# Patient Record
Sex: MALE | Race: WHITE | Employment: OTHER | URBAN - METROPOLITAN AREA
[De-identification: names, ages, dates, MRNs, and addresses within clinical notes are randomized per-mention and may not be internally consistent; named-entity substitution may affect disease eponyms.]

---

## 2017-07-12 ENCOUNTER — HOSPITAL ENCOUNTER (OUTPATIENT)
Dept: LAB | Age: 82
Discharge: HOME | End: 2017-07-12

## 2017-07-12 PROCEDURE — 88305 TISSUE EXAM BY PATHOLOGIST: CPT | Performed by: INTERNAL MEDICINE

## 2017-07-12 PROCEDURE — 88312 SPECIAL STAINS GROUP 1: CPT | Performed by: INTERNAL MEDICINE

## 2018-07-26 PROBLEM — M54.2 CERVICALGIA: Status: ACTIVE | Noted: 2018-05-16

## 2018-08-08 ENCOUNTER — HOSPITAL ENCOUNTER (OUTPATIENT)
Dept: CT IMAGING | Age: 83
Discharge: HOME | End: 2018-08-08
Attending: INTERNAL MEDICINE
Payer: MEDICARE

## 2018-08-08 DIAGNOSIS — I70.209 ATHEROSCLEROSIS OF ARTERIES OF EXTREMITIES (HCC): ICD-10-CM

## 2018-08-08 LAB — CREAT BLD-MCNC: 1.3 MG/DL (ref 0.8–1.5)

## 2018-08-08 PROCEDURE — 74011636320 HC RX REV CODE- 636/320: Performed by: INTERNAL MEDICINE

## 2018-08-08 PROCEDURE — 82565 ASSAY OF CREATININE: CPT

## 2018-08-08 PROCEDURE — 75635 CT ANGIO ABDOMINAL ARTERIES: CPT

## 2018-08-08 PROCEDURE — 74011000258 HC RX REV CODE- 258: Performed by: INTERNAL MEDICINE

## 2018-08-08 RX ORDER — SODIUM CHLORIDE 0.9 % (FLUSH) 0.9 %
10 SYRINGE (ML) INJECTION
Status: COMPLETED | OUTPATIENT
Start: 2018-08-08 | End: 2018-08-08

## 2018-08-08 RX ADMIN — IOPAMIDOL 125 ML: 755 INJECTION, SOLUTION INTRAVENOUS at 11:23

## 2018-08-08 RX ADMIN — SODIUM CHLORIDE 100 ML: 900 INJECTION, SOLUTION INTRAVENOUS at 11:24

## 2018-08-08 RX ADMIN — Medication 10 ML: at 11:24

## 2018-08-13 PROBLEM — I73.9 PAD (PERIPHERAL ARTERY DISEASE) (HCC): Status: ACTIVE | Noted: 2018-08-13

## 2019-01-17 ENCOUNTER — HOSPITAL ENCOUNTER (OUTPATIENT)
Dept: LAB | Age: 84
Discharge: HOME | End: 2019-01-17
Payer: MEDICARE

## 2019-01-17 DIAGNOSIS — C61 MALIGNANT NEOPLASM OF PROSTATE (HCC): ICD-10-CM

## 2019-01-17 LAB
ALBUMIN SERPL-MCNC: 3.6 G/DL (ref 3.2–4.6)
ALBUMIN/GLOB SERPL: 1 {RATIO} (ref 1.2–3.5)
ALP SERPL-CCNC: 69 U/L (ref 50–136)
ALT SERPL-CCNC: 16 U/L (ref 12–65)
ANION GAP SERPL CALC-SCNC: 0 MMOL/L (ref 7–16)
AST SERPL-CCNC: 12 U/L (ref 15–37)
BASOPHILS # BLD: 0 K/UL (ref 0–0.2)
BASOPHILS NFR BLD: 1 % (ref 0–2)
BILIRUB SERPL-MCNC: 0.6 MG/DL (ref 0.2–1.1)
BUN SERPL-MCNC: 22 MG/DL (ref 8–23)
CALCIUM SERPL-MCNC: 8.8 MG/DL (ref 8.3–10.4)
CHLORIDE SERPL-SCNC: 108 MMOL/L (ref 98–107)
CO2 SERPL-SCNC: 34 MMOL/L (ref 21–32)
CREAT SERPL-MCNC: 1.33 MG/DL (ref 0.8–1.5)
DIFFERENTIAL METHOD BLD: ABNORMAL
EOSINOPHIL # BLD: 0.1 K/UL (ref 0–0.8)
EOSINOPHIL NFR BLD: 3 % (ref 0.5–7.8)
ERYTHROCYTE [DISTWIDTH] IN BLOOD BY AUTOMATED COUNT: 13.6 % (ref 11.9–14.6)
GLOBULIN SER CALC-MCNC: 3.7 G/DL (ref 2.3–3.5)
GLUCOSE SERPL-MCNC: 150 MG/DL (ref 65–100)
HCT VFR BLD AUTO: 38.7 % (ref 41.1–50.3)
HGB BLD-MCNC: 12.7 G/DL (ref 13.6–17.2)
IMM GRANULOCYTES # BLD AUTO: 0 K/UL (ref 0–0.5)
IMM GRANULOCYTES NFR BLD AUTO: 0 % (ref 0–5)
LYMPHOCYTES # BLD: 1.2 K/UL (ref 0.5–4.6)
LYMPHOCYTES NFR BLD: 24 % (ref 13–44)
MCH RBC QN AUTO: 30.7 PG (ref 26.1–32.9)
MCHC RBC AUTO-ENTMCNC: 32.8 G/DL (ref 31.4–35)
MCV RBC AUTO: 93.5 FL (ref 79.6–97.8)
MONOCYTES # BLD: 0.5 K/UL (ref 0.1–1.3)
MONOCYTES NFR BLD: 9 % (ref 4–12)
NEUTS SEG # BLD: 3.2 K/UL (ref 1.7–8.2)
NEUTS SEG NFR BLD: 63 % (ref 43–78)
NRBC # BLD: 0 K/UL (ref 0–0.2)
PLATELET # BLD AUTO: 152 K/UL (ref 150–450)
PMV BLD AUTO: 9.7 FL (ref 9.4–12.3)
POTASSIUM SERPL-SCNC: 3.8 MMOL/L (ref 3.5–5.1)
PROT SERPL-MCNC: 7.3 G/DL (ref 6.3–8.2)
RBC # BLD AUTO: 4.14 M/UL (ref 4.23–5.67)
SODIUM SERPL-SCNC: 142 MMOL/L (ref 136–145)
WBC # BLD AUTO: 5.1 K/UL (ref 4.3–11.1)

## 2019-01-17 PROCEDURE — 80053 COMPREHEN METABOLIC PANEL: CPT

## 2019-01-17 PROCEDURE — 85025 COMPLETE CBC W/AUTO DIFF WBC: CPT

## 2019-01-17 PROCEDURE — 36415 COLL VENOUS BLD VENIPUNCTURE: CPT

## 2019-09-25 PROBLEM — Z85.828 STATUS POST MOHS SURGERY FOR SQUAMOUS CELL CARCINOMA OF SKIN: Status: ACTIVE | Noted: 2019-09-25

## 2019-09-25 PROBLEM — Z98.890 STATUS POST MOHS SURGERY FOR SQUAMOUS CELL CARCINOMA OF SKIN: Status: ACTIVE | Noted: 2019-09-25

## 2019-12-03 ENCOUNTER — APPOINTMENT (OUTPATIENT)
Dept: CT IMAGING | Age: 84
DRG: 065 | End: 2019-12-03
Attending: EMERGENCY MEDICINE
Payer: MEDICARE

## 2019-12-03 ENCOUNTER — HOSPITAL ENCOUNTER (INPATIENT)
Age: 84
LOS: 2 days | Discharge: HOME | DRG: 065 | End: 2019-12-05
Attending: EMERGENCY MEDICINE | Admitting: FAMILY MEDICINE
Payer: MEDICARE

## 2019-12-03 DIAGNOSIS — I10 ESSENTIAL HYPERTENSION: ICD-10-CM

## 2019-12-03 DIAGNOSIS — I10 MALIGNANT HYPERTENSION: ICD-10-CM

## 2019-12-03 DIAGNOSIS — G45.9 TIA (TRANSIENT ISCHEMIC ATTACK): Primary | ICD-10-CM

## 2019-12-03 PROBLEM — I16.1 HYPERTENSIVE EMERGENCY: Status: ACTIVE | Noted: 2019-12-03

## 2019-12-03 LAB
ALBUMIN SERPL-MCNC: 3.9 G/DL (ref 3.2–4.6)
ALBUMIN/GLOB SERPL: 1.1 {RATIO} (ref 1.2–3.5)
ALP SERPL-CCNC: 58 U/L (ref 50–136)
ALT SERPL-CCNC: 18 U/L (ref 12–65)
ANION GAP SERPL CALC-SCNC: 5 MMOL/L (ref 7–16)
APPEARANCE UR: CLEAR
APTT PPP: 28 SEC (ref 24.7–39.8)
AST SERPL-CCNC: 18 U/L (ref 15–37)
BACTERIA URNS QL MICRO: 0 /HPF
BASOPHILS # BLD: 0 K/UL (ref 0–0.2)
BASOPHILS NFR BLD: 1 % (ref 0–2)
BILIRUB SERPL-MCNC: 0.8 MG/DL (ref 0.2–1.1)
BILIRUB UR QL: NEGATIVE
BUN SERPL-MCNC: 27 MG/DL (ref 8–23)
CALCIUM SERPL-MCNC: 9 MG/DL (ref 8.3–10.4)
CASTS URNS QL MICRO: 0 /LPF
CHLORIDE SERPL-SCNC: 108 MMOL/L (ref 98–107)
CO2 SERPL-SCNC: 32 MMOL/L (ref 21–32)
COLOR UR: YELLOW
CREAT SERPL-MCNC: 1.34 MG/DL (ref 0.8–1.5)
DIFFERENTIAL METHOD BLD: ABNORMAL
EOSINOPHIL # BLD: 0.1 K/UL (ref 0–0.8)
EOSINOPHIL NFR BLD: 2 % (ref 0.5–7.8)
EPI CELLS #/AREA URNS HPF: 0 /HPF
ERYTHROCYTE [DISTWIDTH] IN BLOOD BY AUTOMATED COUNT: 13.2 % (ref 11.9–14.6)
GLOBULIN SER CALC-MCNC: 3.5 G/DL (ref 2.3–3.5)
GLUCOSE BLD STRIP.AUTO-MCNC: 103 MG/DL (ref 65–100)
GLUCOSE SERPL-MCNC: 101 MG/DL (ref 65–100)
GLUCOSE UR STRIP.AUTO-MCNC: NEGATIVE MG/DL
HCT VFR BLD AUTO: 42.9 % (ref 41.1–50.3)
HGB BLD-MCNC: 14.2 G/DL (ref 13.6–17.2)
HGB UR QL STRIP: ABNORMAL
IMM GRANULOCYTES # BLD AUTO: 0 K/UL (ref 0–0.5)
IMM GRANULOCYTES NFR BLD AUTO: 0 % (ref 0–5)
INR BLD: 1.5 (ref 0.9–1.2)
INR PPP: 1
KETONES UR QL STRIP.AUTO: NEGATIVE MG/DL
LEUKOCYTE ESTERASE UR QL STRIP.AUTO: NEGATIVE
LYMPHOCYTES # BLD: 1.6 K/UL (ref 0.5–4.6)
LYMPHOCYTES NFR BLD: 27 % (ref 13–44)
MCH RBC QN AUTO: 31.6 PG (ref 26.1–32.9)
MCHC RBC AUTO-ENTMCNC: 33.1 G/DL (ref 31.4–35)
MCV RBC AUTO: 95.3 FL (ref 79.6–97.8)
MONOCYTES # BLD: 0.6 K/UL (ref 0.1–1.3)
MONOCYTES NFR BLD: 10 % (ref 4–12)
NEUTS SEG # BLD: 3.6 K/UL (ref 1.7–8.2)
NEUTS SEG NFR BLD: 60 % (ref 43–78)
NITRITE UR QL STRIP.AUTO: NEGATIVE
NRBC # BLD: 0 K/UL (ref 0–0.2)
PH UR STRIP: 7.5 [PH] (ref 5–9)
PLATELET # BLD AUTO: 148 K/UL (ref 150–450)
PMV BLD AUTO: 10.2 FL (ref 9.4–12.3)
POTASSIUM SERPL-SCNC: 4 MMOL/L (ref 3.5–5.1)
PROT SERPL-MCNC: 7.4 G/DL (ref 6.3–8.2)
PROT UR STRIP-MCNC: NEGATIVE MG/DL
PROTHROMBIN TIME: 13.1 SEC (ref 11.7–14.5)
PT BLD: 17.6 SECS (ref 9.6–11.6)
RBC # BLD AUTO: 4.5 M/UL (ref 4.23–5.6)
RBC #/AREA URNS HPF: ABNORMAL /HPF
SODIUM SERPL-SCNC: 145 MMOL/L (ref 136–145)
SP GR UR REFRACTOMETRY: 1.01 (ref 1–1.02)
UROBILINOGEN UR QL STRIP.AUTO: 0.2 EU/DL (ref 0.2–1)
WBC # BLD AUTO: 5.9 K/UL (ref 4.3–11.1)
WBC URNS QL MICRO: 0 /HPF

## 2019-12-03 PROCEDURE — 96375 TX/PRO/DX INJ NEW DRUG ADDON: CPT | Performed by: EMERGENCY MEDICINE

## 2019-12-03 PROCEDURE — 74011250636 HC RX REV CODE- 250/636: Performed by: EMERGENCY MEDICINE

## 2019-12-03 PROCEDURE — 96374 THER/PROPH/DIAG INJ IV PUSH: CPT | Performed by: EMERGENCY MEDICINE

## 2019-12-03 PROCEDURE — 81001 URINALYSIS AUTO W/SCOPE: CPT

## 2019-12-03 PROCEDURE — 80053 COMPREHEN METABOLIC PANEL: CPT

## 2019-12-03 PROCEDURE — 74011250637 HC RX REV CODE- 250/637: Performed by: EMERGENCY MEDICINE

## 2019-12-03 PROCEDURE — 93005 ELECTROCARDIOGRAM TRACING: CPT | Performed by: EMERGENCY MEDICINE

## 2019-12-03 PROCEDURE — 85730 THROMBOPLASTIN TIME PARTIAL: CPT

## 2019-12-03 PROCEDURE — 85610 PROTHROMBIN TIME: CPT

## 2019-12-03 PROCEDURE — 74011000250 HC RX REV CODE- 250: Performed by: EMERGENCY MEDICINE

## 2019-12-03 PROCEDURE — 65660000000 HC RM CCU STEPDOWN

## 2019-12-03 PROCEDURE — 85025 COMPLETE CBC W/AUTO DIFF WBC: CPT

## 2019-12-03 PROCEDURE — 70450 CT HEAD/BRAIN W/O DYE: CPT

## 2019-12-03 PROCEDURE — 99285 EMERGENCY DEPT VISIT HI MDM: CPT | Performed by: EMERGENCY MEDICINE

## 2019-12-03 PROCEDURE — 82962 GLUCOSE BLOOD TEST: CPT

## 2019-12-03 RX ORDER — NICARDIPINE HYDROCHLORIDE 0.1 MG/ML
5-15 INJECTION INTRAVENOUS CONTINUOUS
Status: DISCONTINUED | OUTPATIENT
Start: 2019-12-03 | End: 2019-12-03 | Stop reason: SDUPTHER

## 2019-12-03 RX ORDER — ASPIRIN 325 MG
325 TABLET ORAL
Status: COMPLETED | OUTPATIENT
Start: 2019-12-03 | End: 2019-12-03

## 2019-12-03 RX ORDER — CLONIDINE HYDROCHLORIDE 0.1 MG/1
0.1 TABLET ORAL
Status: COMPLETED | OUTPATIENT
Start: 2019-12-03 | End: 2019-12-03

## 2019-12-03 RX ORDER — LABETALOL HYDROCHLORIDE 5 MG/ML
10 INJECTION, SOLUTION INTRAVENOUS
Status: COMPLETED | OUTPATIENT
Start: 2019-12-03 | End: 2019-12-03

## 2019-12-03 RX ORDER — HYDRALAZINE HYDROCHLORIDE 20 MG/ML
10 INJECTION INTRAMUSCULAR; INTRAVENOUS
Status: COMPLETED | OUTPATIENT
Start: 2019-12-03 | End: 2019-12-03

## 2019-12-03 RX ADMIN — NICARDIPINE HYDROCHLORIDE 5 MG/HR: 25 INJECTION INTRAVENOUS at 23:11

## 2019-12-03 RX ADMIN — CLONIDINE HYDROCHLORIDE 0.1 MG: 0.1 TABLET ORAL at 20:33

## 2019-12-03 RX ADMIN — HYDRALAZINE HYDROCHLORIDE 10 MG: 20 INJECTION, SOLUTION INTRAMUSCULAR; INTRAVENOUS at 21:04

## 2019-12-03 RX ADMIN — LABETALOL HYDROCHLORIDE 10 MG: 5 INJECTION INTRAVENOUS at 20:21

## 2019-12-03 RX ADMIN — ASPIRIN 325 MG ORAL TABLET 325 MG: 325 PILL ORAL at 20:22

## 2019-12-04 ENCOUNTER — APPOINTMENT (OUTPATIENT)
Dept: ULTRASOUND IMAGING | Age: 84
DRG: 065 | End: 2019-12-04
Attending: FAMILY MEDICINE
Payer: MEDICARE

## 2019-12-04 ENCOUNTER — APPOINTMENT (OUTPATIENT)
Dept: MRI IMAGING | Age: 84
DRG: 065 | End: 2019-12-04
Attending: FAMILY MEDICINE
Payer: MEDICARE

## 2019-12-04 ENCOUNTER — APPOINTMENT (OUTPATIENT)
Dept: CT IMAGING | Age: 84
DRG: 065 | End: 2019-12-04
Attending: INTERNAL MEDICINE
Payer: MEDICARE

## 2019-12-04 PROBLEM — I63.9 CVA (CEREBRAL VASCULAR ACCIDENT) (HCC): Status: ACTIVE | Noted: 2019-12-04

## 2019-12-04 PROBLEM — G45.9 TIA (TRANSIENT ISCHEMIC ATTACK): Status: RESOLVED | Noted: 2019-12-03 | Resolved: 2019-12-04

## 2019-12-04 LAB
ATRIAL RATE: 69 BPM
CALCULATED P AXIS, ECG09: 61 DEGREES
CALCULATED R AXIS, ECG10: 10 DEGREES
CALCULATED T AXIS, ECG11: -10 DEGREES
CHOLEST SERPL-MCNC: 154 MG/DL
DIAGNOSIS, 93000: NORMAL
ERYTHROCYTE [DISTWIDTH] IN BLOOD BY AUTOMATED COUNT: 13.2 % (ref 11.9–14.6)
EST. AVERAGE GLUCOSE BLD GHB EST-MCNC: 114 MG/DL
GLUCOSE BLD STRIP.AUTO-MCNC: 137 MG/DL (ref 65–100)
HBA1C MFR BLD: 5.6 % (ref 4.8–6)
HCT VFR BLD AUTO: 36.3 % (ref 41.1–50.3)
HDLC SERPL-MCNC: 64 MG/DL (ref 40–60)
HDLC SERPL: 2.4 {RATIO}
HGB BLD-MCNC: 12.2 G/DL (ref 13.6–17.2)
LDLC SERPL CALC-MCNC: 81.6 MG/DL
LIPID PROFILE,FLP: ABNORMAL
MCH RBC QN AUTO: 31.8 PG (ref 26.1–32.9)
MCHC RBC AUTO-ENTMCNC: 33.6 G/DL (ref 31.4–35)
MCV RBC AUTO: 94.5 FL (ref 79.6–97.8)
NRBC # BLD: 0 K/UL (ref 0–0.2)
P-R INTERVAL, ECG05: 158 MS
PLATELET # BLD AUTO: 136 K/UL (ref 150–450)
PMV BLD AUTO: 10.1 FL (ref 9.4–12.3)
Q-T INTERVAL, ECG07: 380 MS
QRS DURATION, ECG06: 102 MS
QTC CALCULATION (BEZET), ECG08: 407 MS
RBC # BLD AUTO: 3.84 M/UL (ref 4.23–5.6)
TRIGL SERPL-MCNC: 42 MG/DL (ref 35–150)
VENTRICULAR RATE, ECG03: 69 BPM
VLDLC SERPL CALC-MCNC: 8.4 MG/DL (ref 6–23)
WBC # BLD AUTO: 6.8 K/UL (ref 4.3–11.1)

## 2019-12-04 PROCEDURE — 97161 PT EVAL LOW COMPLEX 20 MIN: CPT

## 2019-12-04 PROCEDURE — 70551 MRI BRAIN STEM W/O DYE: CPT

## 2019-12-04 PROCEDURE — 74011250637 HC RX REV CODE- 250/637: Performed by: FAMILY MEDICINE

## 2019-12-04 PROCEDURE — 93306 TTE W/DOPPLER COMPLETE: CPT

## 2019-12-04 PROCEDURE — 80061 LIPID PANEL: CPT

## 2019-12-04 PROCEDURE — 92610 EVALUATE SWALLOWING FUNCTION: CPT

## 2019-12-04 PROCEDURE — 97530 THERAPEUTIC ACTIVITIES: CPT

## 2019-12-04 PROCEDURE — 74011250636 HC RX REV CODE- 250/636: Performed by: INTERNAL MEDICINE

## 2019-12-04 PROCEDURE — 86580 TB INTRADERMAL TEST: CPT | Performed by: INTERNAL MEDICINE

## 2019-12-04 PROCEDURE — 82962 GLUCOSE BLOOD TEST: CPT

## 2019-12-04 PROCEDURE — 74011000302 HC RX REV CODE- 302: Performed by: INTERNAL MEDICINE

## 2019-12-04 PROCEDURE — 74011636320 HC RX REV CODE- 636/320: Performed by: INTERNAL MEDICINE

## 2019-12-04 PROCEDURE — 93880 EXTRACRANIAL BILAT STUDY: CPT

## 2019-12-04 PROCEDURE — 74011000258 HC RX REV CODE- 258: Performed by: INTERNAL MEDICINE

## 2019-12-04 PROCEDURE — 74011250637 HC RX REV CODE- 250/637: Performed by: INTERNAL MEDICINE

## 2019-12-04 PROCEDURE — 65660000000 HC RM CCU STEPDOWN

## 2019-12-04 PROCEDURE — 85027 COMPLETE CBC AUTOMATED: CPT

## 2019-12-04 PROCEDURE — 70496 CT ANGIOGRAPHY HEAD: CPT

## 2019-12-04 PROCEDURE — 83036 HEMOGLOBIN GLYCOSYLATED A1C: CPT

## 2019-12-04 RX ORDER — SODIUM CHLORIDE 0.9 % (FLUSH) 0.9 %
5-40 SYRINGE (ML) INJECTION EVERY 8 HOURS
Status: DISCONTINUED | OUTPATIENT
Start: 2019-12-04 | End: 2019-12-05 | Stop reason: HOSPADM

## 2019-12-04 RX ORDER — FAMOTIDINE 20 MG/1
20 TABLET, FILM COATED ORAL DAILY
Status: DISCONTINUED | OUTPATIENT
Start: 2019-12-04 | End: 2019-12-05 | Stop reason: HOSPADM

## 2019-12-04 RX ORDER — MECLIZINE HYDROCHLORIDE 25 MG/1
25 TABLET ORAL
Status: DISCONTINUED | OUTPATIENT
Start: 2019-12-04 | End: 2019-12-05 | Stop reason: HOSPADM

## 2019-12-04 RX ORDER — ATORVASTATIN CALCIUM 40 MG/1
40 TABLET, FILM COATED ORAL
Status: DISCONTINUED | OUTPATIENT
Start: 2019-12-04 | End: 2019-12-04

## 2019-12-04 RX ORDER — LABETALOL HYDROCHLORIDE 5 MG/ML
5 INJECTION, SOLUTION INTRAVENOUS
Status: DISCONTINUED | OUTPATIENT
Start: 2019-12-04 | End: 2019-12-05 | Stop reason: HOSPADM

## 2019-12-04 RX ORDER — GUAIFENESIN 100 MG/5ML
81 LIQUID (ML) ORAL DAILY
Status: DISCONTINUED | OUTPATIENT
Start: 2019-12-04 | End: 2019-12-05 | Stop reason: HOSPADM

## 2019-12-04 RX ORDER — FAMOTIDINE 20 MG/1
20 TABLET, FILM COATED ORAL 2 TIMES DAILY
Status: DISCONTINUED | OUTPATIENT
Start: 2019-12-04 | End: 2019-12-04 | Stop reason: SDUPTHER

## 2019-12-04 RX ORDER — ATORVASTATIN CALCIUM 40 MG/1
80 TABLET, FILM COATED ORAL
Status: DISCONTINUED | OUTPATIENT
Start: 2019-12-04 | End: 2019-12-05 | Stop reason: HOSPADM

## 2019-12-04 RX ORDER — FLUTICASONE PROPIONATE 50 MCG
2 SPRAY, SUSPENSION (ML) NASAL DAILY
Status: DISCONTINUED | OUTPATIENT
Start: 2019-12-04 | End: 2019-12-05 | Stop reason: HOSPADM

## 2019-12-04 RX ORDER — HYDRALAZINE HYDROCHLORIDE 20 MG/ML
10 INJECTION INTRAMUSCULAR; INTRAVENOUS
Status: DISCONTINUED | OUTPATIENT
Start: 2019-12-04 | End: 2019-12-05 | Stop reason: HOSPADM

## 2019-12-04 RX ORDER — SODIUM CHLORIDE 0.9 % (FLUSH) 0.9 %
10 SYRINGE (ML) INJECTION
Status: COMPLETED | OUTPATIENT
Start: 2019-12-04 | End: 2019-12-04

## 2019-12-04 RX ORDER — CLONIDINE HYDROCHLORIDE 0.1 MG/1
0.1 TABLET ORAL
Status: DISCONTINUED | OUTPATIENT
Start: 2019-12-04 | End: 2019-12-05 | Stop reason: HOSPADM

## 2019-12-04 RX ORDER — IBUPROFEN 200 MG
200 TABLET ORAL
Status: DISCONTINUED | OUTPATIENT
Start: 2019-12-04 | End: 2019-12-05 | Stop reason: HOSPADM

## 2019-12-04 RX ORDER — LANOLIN ALCOHOL/MO/W.PET/CERES
400 CREAM (GRAM) TOPICAL 2 TIMES DAILY
Status: DISCONTINUED | OUTPATIENT
Start: 2019-12-04 | End: 2019-12-05 | Stop reason: HOSPADM

## 2019-12-04 RX ORDER — SODIUM CHLORIDE 0.9 % (FLUSH) 0.9 %
5-40 SYRINGE (ML) INJECTION AS NEEDED
Status: DISCONTINUED | OUTPATIENT
Start: 2019-12-04 | End: 2019-12-05 | Stop reason: HOSPADM

## 2019-12-04 RX ORDER — VALSARTAN 80 MG/1
80 TABLET ORAL DAILY
Status: DISCONTINUED | OUTPATIENT
Start: 2019-12-04 | End: 2019-12-05 | Stop reason: HOSPADM

## 2019-12-04 RX ORDER — ACETAMINOPHEN 325 MG/1
650 TABLET ORAL
Status: DISCONTINUED | OUTPATIENT
Start: 2019-12-04 | End: 2019-12-05 | Stop reason: HOSPADM

## 2019-12-04 RX ADMIN — IOPAMIDOL 50 ML: 755 INJECTION, SOLUTION INTRAVENOUS at 18:40

## 2019-12-04 RX ADMIN — IBUPROFEN 200 MG: 200 TABLET, FILM COATED ORAL at 09:39

## 2019-12-04 RX ADMIN — Medication 400 MG: at 09:39

## 2019-12-04 RX ADMIN — Medication 5 ML: at 14:45

## 2019-12-04 RX ADMIN — TUBERCULIN PURIFIED PROTEIN DERIVATIVE 5 UNITS: 5 INJECTION, SOLUTION INTRADERMAL at 14:20

## 2019-12-04 RX ADMIN — SODIUM CHLORIDE 100 ML: 900 INJECTION, SOLUTION INTRAVENOUS at 18:40

## 2019-12-04 RX ADMIN — HYDRALAZINE HYDROCHLORIDE 10 MG: 20 INJECTION, SOLUTION INTRAMUSCULAR; INTRAVENOUS at 23:22

## 2019-12-04 RX ADMIN — Medication 10 ML: at 22:38

## 2019-12-04 RX ADMIN — Medication 10 ML: at 18:40

## 2019-12-04 RX ADMIN — FAMOTIDINE 20 MG: 20 TABLET ORAL at 09:39

## 2019-12-04 RX ADMIN — CLONIDINE HYDROCHLORIDE 0.1 MG: 0.1 TABLET ORAL at 09:44

## 2019-12-04 RX ADMIN — ASPIRIN 81 MG 81 MG: 81 TABLET ORAL at 09:39

## 2019-12-04 RX ADMIN — ATORVASTATIN CALCIUM 40 MG: 40 TABLET, FILM COATED ORAL at 00:46

## 2019-12-04 RX ADMIN — ATORVASTATIN CALCIUM 80 MG: 40 TABLET, FILM COATED ORAL at 22:38

## 2019-12-04 RX ADMIN — Medication 400 MG: at 18:07

## 2019-12-04 RX ADMIN — VALSARTAN 80 MG: 80 TABLET ORAL at 14:20

## 2019-12-04 NOTE — PROGRESS NOTES
SPEECH LANGUAGE PATHOLOGY: DYSPHAGIA- Initial Assessment    NAME/AGE/GENDER: Bhakti Vanessa is a 81 y.o. male  DATE: 12/4/2019  PRIMARY DIAGNOSIS: Hypertensive emergency [I16.1]  TIA (transient ischemic attack) [G45.9]  Hypertensive emergency [I16.1]  TIA (transient ischemic attack) [G45.9]      ICD-10: Treatment Diagnosis: R13.12 Dysphagia, Oropharyngeal Phase    INTERDISCIPLINARY COLLABORATION: Registered Nurse  PRECAUTIONS/ALLERGIES: Amiodarone; Benazepril; Hydrochlorothiazide; and Lisinopril       SUBJECTIVE   Patient alert, sitting upright in bed and eating breakfast. Daughter and wife at bedside. Denies history of dysphagia       History of Present Injury/Illness: Mr. Chetan Pardo  has a past medical history of Abdominal pain (5/9/2016), Acute prostatitis, Backache, unspecified, Calculus of kidney, Cancer ([facility] [place] [address] Diabetes ([facility] [place] [address] Diverticulosis (1/14/14), Edema (5/9/2016), Elevated prostate specific antigen (PSA), Essential hypertension, benign, GERD (gastroesophageal reflux disease), H. pylori infection (12/1/14), History of barium enema (3/18/14), History of BPH, Hypertension, Hypertrophy of prostate without urinary obstruction and other lower urinary tract symptoms (LUTS), Impotence of organic origin, Lumbago, Malignant neoplasm of prostate ([facility] [place] [address] Microscopic hematuria, Multiple renal cysts (1/14/14), Osteopenia determined by x-ray, Other ill-defined conditions(799.89), PVCs (premature ventricular contractions) (5/9/2016), and SSS (sick sinus syndrome) ([facility] [place] [address] (5/9/2016). Behzad Angry He also  has a past surgical history that includes hx other surgical; hx hernia repair; hx radical prostatectomy; hx urological; hx tonsil and adenoidectomy; hx cholecystectomy; hx colonoscopy (2010); and hx endoscopy (07/12/2017). Problem List:  (Impairments causing functional limitations):  1.  Oropharyngeal dysphagia- No symptoms identified  2.   3.    Previous Dysphagia: NONE REPORTED    Diet Prior to Evaluation: Regular/thin Orientation:   Person  Place  Time  Situation    Pain: Pain Scale 1: Numeric (0 - 10)  Pain Intensity 1: 0  Pain Location 1: Head       Cognitive-Linguistic Screen:   Speech Production:   o WNL   Expressive Language:  o WNL   Receptive Language:  o WNL   Cognition:   o WNL     Patient endorsing new onset of expressive aphasia yesterday leading to hospitalization. He feels speech has greatly improved, but is not back at baseline. He also reports progressive difficulty with memory (specifically remembering names) and \"searching for words\" over last 3-4 months. No overt difficulty with speech production, receptive language, or expression during evaluation. OBJECTIVE   Oral Motor:   · Labial: No impairment  · Dentition: Intact  · Oral Hygiene: Adequate  · Lingual: No impairment     Swallow assessment:  Patient presented with thin liquid via cup and straw, puree, mixed, and solid consistencies. Appropriate oral prep with all textures. Timely swallow initiation, and single swallows upon palpation. Adequate oral clearing. No overt signs or symptoms of airway compromise observed with liquid or solid textures. ;         ASSESSMENT   Patient presents with oropharyngeal swallow function that is within normal limits. No s/sx of airway compromise. Recommend regular diet/thin liquids. Medications whole with liquid wash. No dysphagia treatment indicated. MRI indicates new CVA. Expressive aphasia reported by patient that was present over last 3-4 months, but more significant yesterday. Will follow up for full evaluation of language and cognitive function at next session.             Tool Used: Dysphagia Outcome and Severity Scale (EBEN)    Score Comments   Normal Diet  [] 7 With no strategies or extra time needed   Functional Swallow  [] 6 May have mild oral or pharyngeal delay   Mild Dysphagia  [] 5 Which may require one diet consistency restricted    Mild-Moderate Dysphagia  [] 4 With 1-2 diet consistencies restricted   Moderate Dysphagia  [] 3 With 2 or more diet consistencies restricted   Moderate-Severe Dysphagia  [] 2 With partial PO strategies (trials with ST only)   Severe Dysphagia  [] 1 With inability to tolerate any PO safely      Score:  Initial: 7 Most Recent: x (Date 12/04/19 )   Interpretation of Tool: The Dysphagia Outcome and Severity Scale (EBEN) is a simple, easy-to-use, 7-point scale developed to systematically rate the functional severity of dysphagia based on objective assessment and make recommendations for diet level, independence level, and type of nutrition. Current Medications:   No current facility-administered medications on file prior to encounter. Current Outpatient Medications on File Prior to Encounter   Medication Sig Dispense Refill    fish oil-dha-epa 1,200-144-216 mg cap Take  by mouth.  acetylglucosamine (N-ACETYL-ALPHA-D-GLUCOSAMINE) by Does Not Apply route.  diclofenac EC (VOLTAREN) 25 mg EC tablet Take  by mouth two (2) times a day.  docusate sodium (STOOL SOFTENER) 100 mg tab Take  by mouth.  OTHER Organic Smooth Move for constipation      acetaminophen (TYLENOL) 325 mg tablet Take  by mouth every four (4) hours as needed for Pain.  ibuprofen (ADVIL) 200 mg tablet Take 200 mg by mouth every six (6) hours as needed for Pain.  furosemide (LASIX) 20 mg tablet Once daily if needed for swelling of the legs 30 Tab 0    permethrin (ACTICIN) 5 % topical cream apply sparingly as directed 60 g 0    magnesium oxide (MAG-OX) 400 mg tablet TAKE 1 TAB BY MOUTH TWO (2) TIMES A DAY. 60 Tab 5    valsartan (DIOVAN) 80 mg tablet Take 1 Tab by mouth daily. 90 Tab 3    raNITIdine (ZANTAC) 150 mg tablet Take 1 Tab by mouth two (2) times a day. 60 Tab 5    cloNIDine HCl (CATAPRES) 0.1 mg tablet Take 1 Tab by mouth daily as needed (HTN). 10 Tab 0    fluticasone propionate (FLONASE) 50 mcg/actuation nasal spray 2 Sprays by Both Nostrils route daily.  1 Bottle 5    triamcinolone acetonide (KENALOG) 0.025 % topical cream Apply  to affected area two (2) times a day. use thin layer      betamethasone dipropionate (DIPROLENE) 0.05 % ointment Apply  to affected area two (2) times a day.  fexofenadine (ALLEGRA) 180 mg tablet Take 180 mg by mouth daily as needed for Allergies.  hydrocortisone (HYTONE) 2.5 % topical cream Apply  to affected area two (2) times a day. use thin layer 30 g 0    cholecalciferol, vitamin D3, (VITAMIN D3) 2,000 unit tab Take  by mouth.  TURMERIC PO Take  by mouth.  co-enzyme Q-10 (CO Q-10) 100 mg capsule Take 100 mg by mouth daily.  folic acid-vit C7-NRS I02 2.2-25-1 mg tab Take  by mouth.  meclizine (ANTIVERT) 25 mg tablet Take 1 Tab by mouth three (3) times daily as needed. 90 Tab 2    nystatin-triamcinolone (MYCOLOG II) topical cream Apply  to affected area two (2) times daily as needed for Skin Irritation. 30 g 1         PLAN    FREQUENCY/DURATION: Speech therapy to follow up for assessment of cognitive-linguistic function.     - Recommendations for next treatment session: Next treatment will address language and cognitive assessment     REHABILITATION POTENTIAL FOR STATED GOALS: Excellent         RECOMMENDATIONS   DIET:    PO:  Regular   Liquids:  regular thin    MEDICATIONS: With liquid     ASPIRATION PRECAUTIONS  · Slow rate of intake  · Small bites/sips  · Upright at 90 degrees during meal     COMPENSATORY STRATEGIES/MODIFICATIONS  · None     EDUCATION:  · Recommendations discussed with Nursing  · Patient     RECOMMENDATIONS for CONTINUED SPEECH THERAPY:   YES: Anticipate need for ongoing speech therapy during this hospitalization and at next level of care.        SAFETY:  After treatment position/precautions:  · Upright in bed  · RN notified  · WIfe and daughter at bedside    Total Treatment Duration:   Time In: 5362  Time Out: [place], [address] CCC-SLP

## 2019-12-04 NOTE — ED PROVIDER NOTES
78-year-old gentleman presenting for evaluation of word finding difficulty and facial tingling. Reports that he woke up this morning feeling normal at about 330 this afternoon he went [place] shopping. When he got home about 530 his wife noticed that he was difficulty explaining himself. He could get certain words to come out. He seems somewhat confused and a little bit disoriented. He was complaining of a headache during this time. EMS was called by the time they arrived he was complaining of right-sided facial tingling along with some word finding difficulty. But they took his vital signs and found that he was hypertensive with a blood pressure over 289 systolic and was transported to the emergency department without intervention. Patient was taken immediately to CT scanner and back to the room. Upon my evaluation patient symptoms seem to have resolved. He reports that something similar happened about 3 or 4 weeks ago while they were traveling. In that ER visit they never gave the patient a definitive diagnosis or determination of what was causing his symptoms. The history is provided by the patient and the spouse. Aphasia   This is a recurrent problem. The current episode started 3 to 5 hours ago. The problem has been resolved. There was right facial focality noted. Primary symptoms include speech difficulty, mental status change and disorientation. Pertinent negatives include no focal weakness, no loss of sensation, no loss of balance, no slurred speech, no memory loss, no movement disorder, no agitation, no visual change, no auditory change and no unresponsiveness. There has been no fever. Associated symptoms include headaches. Pertinent negatives include no shortness of breath, no chest pain, no vomiting, no altered mental status, no confusion, no choking, no nausea and no bowel incontinence.         Past Medical History:   Diagnosis Date    Abdominal pain 5/9/2016    Acute prostatitis     Backache, unspecified     Calculus of kidney     Cancer ([facility] [address]     prostate    Diabetes ([facility]     hypoglycemia    Diverticulosis 1/14/14    Edema 5/9/2016    Elevated prostate specific antigen (PSA)     Essential hypertension, benign     GERD (gastroesophageal reflux disease)     H. pylori infection 12/1/14    s/p treatment    History of barium enema 3/18/14    ordered by Dr. Dea Vinson, GI    History of BPH     Hypertension     Hypertrophy of prostate without urinary obstruction and other lower urinary tract symptoms (LUTS)     Impotence of organic origin     Lumbago     Malignant neoplasm of prostate ([facility] [address]     Microscopic hematuria     Multiple renal cysts 1/14/14    left kidney, by CT    Osteopenia determined by x-ray     Dexa, T Score -1.0    Other ill-defined conditions(799.89)     chronic back pain    PVCs (premature ventricular contractions) 5/9/2016    SSS (sick sinus syndrome) ([facility] 5/9/2016       Past Surgical History:   Procedure Laterality Date    HX CHOLECYSTECTOMY      HX COLONOSCOPY  2010    2 polyps benign    HX ENDOSCOPY  07/12/2017    HX HERNIA REPAIR      midline incisional    HX OTHER SURGICAL      prostate; melanoma also-back; hernia    HX RADICAL PROSTATECTOMY      HX TONSIL AND ADENOIDECTOMY      HX UROLOGICAL      cystoscopy with urethral dilatation         Family History:   Problem Relation Age of Onset    Alzheimer Mother     Prostate Cancer Father        Social History     Socioeconomic History    Marital status:      Spouse name: Not on file    Number of children: 2    Years of education: Not on file    Highest education level: Not on file   Occupational History    Not on file   Social Needs    Financial resource strain: Not on file    Food insecurity:     Worry: Not on file     Inability: Not on file    Transportation needs:     Medical: Not on file     Non-medical: Not on file   Tobacco Use    Smoking status: Former Smoker     Packs/day: 2.00 Years: 22.00     Pack years: 44.00     Types: Cigarettes     Last attempt to quit: 3/5/1980     Years since quittin.7    Smokeless tobacco: Never Used   Substance and Sexual Activity    Alcohol use: Yes     Alcohol/week: 0.0 standard drinks     Comment: 2-3 times weekly -one glass of wine    Drug use: No    Sexual activity: Not on file   Lifestyle    Physical activity:     Days per week: Not on file     Minutes per session: Not on file    Stress: Not on file   Relationships    Social connections:     Talks on phone: Not on file     Gets together: Not on file     Attends [employer] service: Not on file     Active member of club or organization: Not on file     Attends meetings of clubs or organizations: Not on file     Relationship status: Not on file    Intimate partner violence:     Fear of current or ex partner: Not on file     Emotionally abused: Not on file     Physically abused: Not on file     Forced sexual activity: Not on file   Other Topics Concern    Not on file   Social History Narrative    Not on file         ALLERGIES: Amiodarone; Benazepril; Hydrochlorothiazide; and Lisinopril    Review of Systems   Respiratory: Negative for choking and shortness of breath. Cardiovascular: Negative for chest pain. Gastrointestinal: Negative for bowel incontinence, nausea and vomiting. Neurological: Positive for speech difficulty and headaches. Negative for focal weakness and loss of balance. Psychiatric/Behavioral: Negative for agitation, confusion and memory loss. All other systems reviewed and are negative. Vitals:    193 19 2104   BP: (!) 215/94 (!) 216/102 (!) 249/114 (!) 220/105   Pulse: 64 64 64 65   Resp:  14 15 12   Temp:       SpO2:  96% 97% 96%   Weight:       Height:                Physical Exam  Vitals signs and nursing note reviewed. Constitutional:       Appearance: He is well-developed.    HENT:      Head: Normocephalic and atraumatic. Eyes:      Conjunctiva/sclera: Conjunctivae normal.      Pupils: Pupils are equal, round, and reactive to light. Neck:      Musculoskeletal: Normal range of motion and neck supple. Cardiovascular:      Rate and Rhythm: Normal rate and regular rhythm. Heart sounds: Normal heart sounds. Pulmonary:      Effort: Pulmonary effort is normal.      Breath sounds: Normal breath sounds. Abdominal:      General: Bowel sounds are normal.      Palpations: Abdomen is soft. Musculoskeletal: Normal range of motion. General: No deformity. Skin:     General: Skin is warm and dry. Neurological:      Mental Status: He is alert and oriented to person, place, and time. Cranial Nerves: No cranial nerve deficit. Psychiatric:         Behavior: Behavior normal.          MDM  Number of Diagnoses or Management Options  Malignant hypertension:   TIA (transient ischemic attack):   Diagnosis management comments: 81-year-old male presenting for transient episode of word finding difficulty facial tingling and hypertension. Concern for CVA, hemorrhage, hypertensive emergency, TIA    Code S was activated upon patient's arrival and his symptoms seem to have resolved. Head CT showed chronic microischemic changes nothing acute and his symptoms have resolved. As a result we will give the patient medications for blood pressure control, full dose aspirin and admit the patient for TIA work-up. He does have a prescription for clonidine as needed for elevated diastolic pressures.        Amount and/or Complexity of Data Reviewed  Clinical lab tests: ordered and reviewed (Results for orders placed or performed during the hospital encounter of 12/03/19  -CBC WITH AUTOMATED DIFF       Result                      Value             Ref Range           WBC                         5.9               4.3 - 11.1 K*       RBC                         4.50              4.23 - 5.6 M*       HGB                         14.2 13.6 - 17.2 *       HCT                         42.9              41.1 - 50.3 %       MCV                         95.3              79.6 - 97.8 *       MCH                         31.6              26.1 - 32.9 *       MCHC                        33.1              31.4 - 35.0 *       RDW                         13.2              11.9 - 14.6 %       PLATELET                    148 (L)           150 - 450 K/*       MPV                         10.2              9.4 - 12.3 FL       ABSOLUTE NRBC               0.00              0.0 - 0.2 K/*       DF                          AUTOMATED                             NEUTROPHILS                 60                43 - 78 %           LYMPHOCYTES                 27                13 - 44 %           MONOCYTES                   10                4.0 - 12.0 %        EOSINOPHILS                 2                 0.5 - 7.8 %         BASOPHILS                   1                 0.0 - 2.0 %         IMMATURE GRANULOCYTES       0                 0.0 - 5.0 %         ABS. NEUTROPHILS            3.6               1.7 - 8.2 K/*       ABS. LYMPHOCYTES            1.6               0.5 - 4.6 K/*       ABS. MONOCYTES              0.6               0.1 - 1.3 K/*       ABS. EOSINOPHILS            0.1               0.0 - 0.8 K/*       ABS. BASOPHILS              0.0               0.0 - 0.2 K/*       ABS. IMM.  GRANS.            0.0               0.0 - 0.5 K/*  -PROTHROMBIN TIME + INR       Result                      Value             Ref Range           Prothrombin time            13.1              11.7 - 14.5 *       INR                         1.0                              -METABOLIC PANEL, COMPREHENSIVE       Result                      Value             Ref Range           Sodium                      145               136 - 145 mm*       Potassium                   4.0               3.5 - 5.1 mm*       Chloride                    108 (H)           98 - 107 mmo*       CO2 32                21 - 32 mmol*       Anion gap                   5 (L)             7 - 16 mmol/L       Glucose                     101 (H)           65 - 100 mg/*       BUN                         27 (H)            8 - 23 MG/DL        Creatinine                  1.34              0.8 - 1.5 MG*       GFR est AA                  >60               >60 ml/min/1*       GFR est non-AA              54 (L)            >60 ml/min/1*       Calcium                     9.0               8.3 - 10.4 M*       Bilirubin, total            0.8               0.2 - 1.1 MG*       ALT (SGPT)                  18                12 - 65 U/L         AST (SGOT)                  18                15 - 37 U/L         Alk. phosphatase            58                50 - 136 U/L        Protein, total              7.4               6.3 - 8.2 g/*       Albumin                     3.9               3.2 - 4.6 g/*       Globulin                    3.5               2.3 - 3.5 g/*       A-G Ratio                   1.1 (L)           1.2 - 3.5      -PTT       Result                      Value             Ref Range           aPTT                        28.0              24.7 - 39.8 *  -POC PT/INR       Result                      Value             Ref Range           Prothrombin time (POC)      17.6 (H)          9.6 - 11.6 S*       INR (POC)                   1.5 (H)           0.9 - 1.2      -GLUCOSE, POC       Result                      Value             Ref Range           Glucose (POC)               103 (H)           65 - 100 mg/*  )  Tests in the radiology section of CPT®: ordered and reviewed (Ct Code Neuro Head Wo Contrast    Result Date: 12/3/2019  HEAD CT WITHOUT CONTRAST  12/3/2019 HISTORY:   Code stroke with left-sided numbness confusion and altered mental status. Aphasia TECHNIQUE: Noncontrast axial images were obtained through the brain.   All CT scans at this facility used dose modulation, interactive reconstruction and/or weight based dosing when appropriate to reduce radiation dose to as low as reasonably achievable. COMPARISON: July 30, 2015 FINDINGS: There is no acute intracranial hemorrhage, significant mass effect or CT evidence of acute large artery territorial infarction. Please note that a hyperacute infarct or small vessel infarct may not be apparent on initial CT imaging. Scattered areas of low-attenuation are present in the supratentorial white matter, suggestive of chronic small vessel ischemic disease. Atherosclerotic calcifications within the middle cerebral arteries have progressed compared to the previous CT in 2015. Mild/moderate cerebral volume loss is present. There is no hydrocephalus , intra-axial mass or abnormal extra-axial fluid collection. There are no displaced skull fractures. The mastoid air cells and paranasal sinuses are clear where imaged. IMPRESSION: 1. Cerebral volume loss and white matter findings compatible with chronic small vessel ischemic disease. 2. No acute intracranial hemorrhage.     )  Tests in the medicine section of CPT®: ordered and reviewed  Discuss the patient with other providers: yes (Discussed with neurologist who recommended TIA work-up    Discussed with hospitalist will assume care)  Independent visualization of images, tracings, or specimens: yes (Reviewed the patient's head CT and EKG)    Risk of Complications, Morbidity, and/or Mortality  Presenting problems: high  Diagnostic procedures: high  Management options: high  General comments: Patient's blood pressure has proved difficult to control so we are starting nicardipine drip. His symptoms are still resolved. I personally reviewed the patient's vital signs, laboratory tests, and/or radiological findings. I discussed these findings with the patient and their significance.   I answered all questions and explained that given these findings there is significant concern for increased morbidity and/or mortality without immediate intervention.   As a result, I recommended admission to the hospital, consulted the appropriate service, and transitioned care to that service in improved condition      Critical Care  Total time providing critical care: 30-74 minutes    Patient Progress  Patient progress: stable    ED Course as of Dec 03 2129   Tue Dec 03, 2019   2028 EKG was performed upon arrival and I am now looking at it which shows a normal sinus rhythm rate 70, borderline left axis deviation, NM is 158, QRS is 102, QTc is 4 7 with no acute ischemic change    [JS]      ED Course User Index  [JS] Freeman Gomes MD       CRITICAL CARE (ASAP ONLY)  Performed by: Freeman Gomes MD  Authorized by: Freeman Gomes MD     Critical care provider statement:     Critical care time (minutes):  38    Critical care start time:  12/3/2019 8:51 PM    Critical care end time:  12/3/2019 9:29 PM    Critical care was necessary to treat or prevent imminent or life-threatening deterioration of the following conditions:  CNS failure or compromise    Critical care was time spent personally by me on the following activities:  Blood draw for specimens, development of treatment plan with patient or surrogate, discussions with consultants, ordering and performing treatments and interventions, ordering and review of laboratory studies, ordering and review of radiographic studies, pulse oximetry, re-evaluation of patient's condition, examination of patient and obtaining history from patient or surrogate    I assumed direction of critical care for this patient from another provider in my specialty: no

## 2019-12-04 NOTE — PROGRESS NOTES
Progress Note    Patient: Santa Arguello MRN: [id number]  SSN: [id number]    YOB: 1935  Age: 81 y.o. Sex: male      Admit Date: 12/3/2019    LOS: 1 day     Subjective:   He was found in no distress. He feels much better. The patient was found walking in his room. His right sided facial tingling has resolved. He does not have problems finding words. Multiple family members present. All questions answered. Patients family contacted Dr. Ida Jacob office and requested to be seen by a cardiologist while in house. Dr. Ida Jacob aware of this. Objective:     Vitals:    12/04/19 0541 12/04/19 0600 12/04/19 0641 12/04/19 0738   BP: 189/77 147/85 124/64 156/84   Pulse: 65 67 73 65   Resp: 14 18  18   Temp:    98.3 °F (36.8 °C)   SpO2: 93% 95%  96%   Weight:       Height:            Intake and Output:  Current Shift: No intake/output data recorded. Last three shifts: 12/02 1901 - 12/04 0700  In: -   Out: 600 [Urine:600]    Physical Exam:   GENERAL: alert, cooperative, no distress, appears stated age  EYE: negative  LYMPHATIC: Cervical, supraclavicular, and axillary nodes normal.   THROAT & NECK: normal and no erythema or exudates noted. LUNG: clear to auscultation bilaterally  HEART: regular rate and rhythm, S1, S2 normal, no murmur, click, rub or gallop  ABDOMEN: soft, non-tender. Bowel sounds normal. No masses,  no organomegaly  EXTREMITIES:  extremities normal, atraumatic, no cyanosis or edema  SKIN: Normal.  NEUROLOGIC: negative  PSYCHIATRIC: non focal    Lab/Data Review: All lab results for the last 24 hours reviewed. Assessment:     Active Hospital Problems    Diagnosis Date Noted    [facility] vascular accident[facility] 12/04/2019    Hypertensive emergency 12/03/2019    SSS (sick sinus syndrome) ([facility] 05/09/2016    PVCs (premature ventricular contractions) 05/09/2016     12K/24hrs. Normal LVEF and no ischemia on stress testing.   Started Amiodarone but patient stopped medication. PVCs resolved and repeat monitor with essentially noPVCs.           Hypertension      Plan:   -acute punctate right putamen CVA:   No focal neurologic deficits  Echo showed normal EF, no shunts   Carotid duplex showed no stenosis   Permissive HTN x 24 hrs  Treat with iv labetalol if SBP >357 , or diastolic > 774  Continue aspirin, high intensity statins  Check head/neck CTA  PT/OT/Speech    -Hypertensive Emergency: resolved  Likely precipitated by acute cva   Likely medication non compliance  Hold home valsartan and prn clonidine to allow permissive HTN for now   Labetalol prn if SBP >532 , or diastolic > 408  The patient and his family requested cardiology consult while in house.      -SSS  -PVCs     DVT ppx: heparin sq    Code status: full       Disposition: home within 1-2 days     Signed By: Winona Bosworth, MD     December 4, 2019

## 2019-12-04 NOTE — PROGRESS NOTES
Care Management Interventions  PCP Verified by CM: Yes  Mode of Transport at Discharge: Other (see comment)(wife to transport)  Transition of Care Consult (CM Consult): Other(home with no needs)  Physical Therapy Consult: Yes  Occupational Therapy Consult: Yes  Speech Therapy Consult: Yes  Current Support Network: Lives with Spouse  Confirm Follow Up Transport: Family  Plan discussed with Pt/Family/Caregiver: Yes  Freedom of Choice Offered: Yes  [place] Resource Information Provided?: No      MSN, CM spoke with patient, wife, and daughter about discharge plans. Patient lives at home with spouse but no children live near by. Patient ambulates and does all ADL's independently. Upon entering room patient stood and walked around room with no difficulty. No case management needs at this time. Case management to follow.

## 2019-12-04 NOTE — ED NOTES
Pt resting in bed with wife at bedside. Pt complains of ongoing HA. Message sent to pharmacy to send Motrin. This is pts only complaint at this time. Pt gone to MRI at this time.

## 2019-12-04 NOTE — PROGRESS NOTES
offered prayer with Family. They attend [facility]. No further needs at this time. Please consult Spiritual Care as needed. Judit Puente.

## 2019-12-04 NOTE — H&P
HOSPITALIST H&P/CONSULT  NAME:  Veronica Barrios   Age:  81 y.o.  :   1935   MRN:   [id number]  PCP: Katie Hidalgo MD  Consulting MD:  Treatment Team: Attending Provider: Judge Yanet MD; Primary Nurse: Chastity Delvalle  HPI:   Patient is an 81yoM with PMH significant for HTN who presents with facial paresthesias and word-finding difficulties. Patient reports that around 17:30 this afternoon, his wife noticed that he was having problems \"getting words out. \"  He reports associated headaches and some mild confusion. EMS were called and he was found to be significantly hypertensive with SBP > 200. Code S was called upon arrival to ER. Patient symptoms had resolved by time of ER physician evaluation, and tPA was not recommended by tele-Neurology. Hospitalist consulted for admission. Patient's blood pressure improved with hydralazine and clonidine in ER. Patient reports that it was the R side of his face that felt a tingling sensation. He denies upper or lower extremity weakness. He reports that he often has hypoglycemia and did not eat breakfast this morning. Unfortunately, he did not take his BG during this episode at home, but he and his wife feel that this could be a contributing factor. Complete ROS done and is as stated in HPI or otherwise negative.     Past Medical History:   Diagnosis Date    Abdominal pain 2016    Acute prostatitis     Backache, unspecified     Calculus of kidney     Cancer ([facility]     prostate    Diabetes ([facility]     hypoglycemia    Diverticulosis 14    Edema 2016    Elevated prostate specific antigen (PSA)     Essential hypertension, benign     GERD (gastroesophageal reflux disease)     H. pylori infection 14    s/p treatment    History of barium enema 3/18/14    ordered by Dr. Stephenie Hare, GI    History of BPH     Hypertension     Hypertrophy of prostate without urinary obstruction and other lower urinary tract symptoms (LUTS)  Impotence of organic origin     Lumbago     [facility] of [facility]     Microscopic hematuria     Multiple renal cysts 1/14/14    left kidney, by CT    Osteopenia determined by x-ray     Dexa, T Score -1.0    Other ill-defined conditions(799.89)     chronic back pain    PVCs (premature ventricular contractions) 5/9/2016    SSS (sick sinus syndrome) ([facility] 5/9/2016    Past Medical History reviewed. Past Surgical History:   Procedure Laterality Date    HX CHOLECYSTECTOMY      HX COLONOSCOPY  2010    2 polyps benign    HX ENDOSCOPY  07/12/2017    HX HERNIA REPAIR      midline incisional    HX OTHER SURGICAL      prostate; melanoma also-back; hernia    HX RADICAL PROSTATECTOMY      HX TONSIL AND ADENOIDECTOMY      HX UROLOGICAL      cystoscopy with urethral dilatation      Prior to Admission Medications   Prescriptions Last Dose Informant Patient Reported? Taking? OTHER   Yes No   Sig: Organic Smooth Move for constipation   TURMERIC PO   Yes No   Sig: Take  by mouth. acetaminophen (TYLENOL) 325 mg tablet   Yes No   Sig: Take  by mouth every four (4) hours as needed for Pain. acetylglucosamine (N-ACETYL-ALPHA-D-GLUCOSAMINE)   Yes No   Sig: by Does Not Apply route. betamethasone dipropionate (DIPROLENE) 0.05 % ointment   Yes No   Sig: Apply  to affected area two (2) times a day. cholecalciferol, vitamin D3, (VITAMIN D3) 2,000 unit tab   Yes No   Sig: Take  by mouth.   cloNIDine HCl (CATAPRES) 0.1 mg tablet   No No   Sig: Take 1 Tab by mouth daily as needed (HTN). co-enzyme Q-10 (CO Q-10) 100 mg capsule   Yes No   Sig: Take 100 mg by mouth daily. diclofenac EC (VOLTAREN) 25 mg EC tablet   Yes No   Sig: Take  by mouth two (2) times a day. docusate sodium (STOOL SOFTENER) 100 mg tab   Yes No   Sig: Take  by mouth. fexofenadine (ALLEGRA) 180 mg tablet   Yes No   Sig: Take 180 mg by mouth daily as needed for Allergies.    fish oil-dha-epa 1,200-144-216 mg cap   Yes No   Sig: Take  by mouth. fluticasone propionate (FLONASE) 50 mcg/actuation nasal spray   No No   Si Sprays by Both Nostrils route daily. folic acid-vit W3-UFQ O81 2.2-25-1 mg tab   Yes No   Sig: Take  by mouth. furosemide (LASIX) 20 mg tablet   No No   Sig: Once daily if needed for swelling of the legs   hydrocortisone (HYTONE) 2.5 % topical cream   No No   Sig: Apply  to affected area two (2) times a day. use thin layer   ibuprofen (ADVIL) 200 mg tablet   Yes No   Sig: Take 200 mg by mouth every six (6) hours as needed for Pain.   magnesium oxide (MAG-OX) 400 mg tablet   No No   Sig: TAKE 1 TAB BY MOUTH TWO (2) TIMES A DAY. meclizine (ANTIVERT) 25 mg tablet   No No   Sig: Take 1 Tab by mouth three (3) times daily as needed. nystatin-triamcinolone (MYCOLOG II) topical cream   No No   Sig: Apply  to affected area two (2) times daily as needed for Skin Irritation. permethrin (ACTICIN) 5 % topical cream   No No   Sig: apply sparingly as directed   raNITIdine (ZANTAC) 150 mg tablet   No No   Sig: Take 1 Tab by mouth two (2) times a day. triamcinolone acetonide (KENALOG) 0.025 % topical cream   Yes No   Sig: Apply  to affected area two (2) times a day. use thin layer   valsartan (DIOVAN) 80 mg tablet   No No   Sig: Take 1 Tab by mouth daily. Facility-Administered Medications: None     Allergies   Allergen Reactions    Amiodarone Rash    Benazepril Unknown (comments)    Hydrochlorothiazide Other (comments)     Hand redness and swelling.  Lisinopril Cough      Social History     Tobacco Use    Smoking status: Former Smoker     Packs/day: 2.00     Years: 22.00     Pack years: 44.00     Types: Cigarettes     Last attempt to quit: 3/5/1980     Years since quittin.7    Smokeless tobacco: Never Used   Substance Use Topics    Alcohol use:  Yes     Alcohol/week: 0.0 standard drinks     Comment: 2-3 times weekly -one glass of wine      Family History   Problem Relation Age of Onset    Alzheimer Mother  Prostate Cancer Father     Family History reviewed and is non-contributory to current presentation. Objective:     Visit Vitals  BP (!) 169/95   Pulse 69   Temp 98.2 °F (36.8 °C)   Resp 23   Ht 5' 9\" (1.753 m)   Wt 75.3 kg (166 lb)   SpO2 96%   BMI 24.51 kg/m²      Temp (24hrs), Av.2 °F (36.8 °C), Min:98.2 °F (36.8 °C), Max:98.2 °F (36.8 °C)    Oxygen Therapy  O2 Sat (%): 96 % (19)  Pulse via Oximetry: 65 beats per minute (19)  O2 Device: Room air (19)  Physical Exam:  General:    Alert, cooperative, no distress, appears stated age. Head:   Normocephalic, without obvious abnormality, atraumatic. Nose:  Nares normal. No drainage or sinus tenderness. Lungs:   Clear to auscultation bilaterally. No Wheezing or Rhonchi. No rales. Heart:   Regular rate and rhythm, no murmur, rub or gallop. Abdomen:   Soft, non-tender. Not distended. Bowel sounds normal.   Extremities: No cyanosis. No edema. No clubbing. Skin:     Texture, turgor normal.  Not Jaundiced. Neurologic: Alert and oriented x 3, no focal deficits. Baseline essential tremor of BUE.   Data Review:   Recent Results (from the past 24 hour(s))   GLUCOSE, POC    Collection Time: 19  7:44 PM   Result Value Ref Range    Glucose (POC) 103 (H) 65 - 100 mg/dL   POC PT/INR    Collection Time: 19  7:54 PM   Result Value Ref Range    Prothrombin time (POC) 17.6 (H) 9.6 - 11.6 SECS    INR (POC) 1.5 (H) 0.9 - 1.2     CBC WITH AUTOMATED DIFF    Collection Time: 19  8:01 PM   Result Value Ref Range    WBC 5.9 4.3 - 11.1 K/uL    RBC 4.50 4.23 - 5.6 M/uL    HGB 14.2 13.6 - 17.2 g/dL    HCT 42.9 41.1 - 50.3 %    MCV 95.3 79.6 - 97.8 FL    MCH 31.6 26.1 - 32.9 PG    MCHC 33.1 31.4 - 35.0 g/dL    RDW 13.2 11.9 - 14.6 %    PLATELET 746 (L) 700 - 450 K/uL    MPV 10.2 9.4 - 12.3 FL    ABSOLUTE NRBC 0.00 0.0 - 0.2 K/uL    DF AUTOMATED      NEUTROPHILS 60 43 - 78 %    LYMPHOCYTES 27 13 - 44 %    MONOCYTES 10 4.0 - 12.0 %    EOSINOPHILS 2 0.5 - 7.8 %    BASOPHILS 1 0.0 - 2.0 %    IMMATURE GRANULOCYTES 0 0.0 - 5.0 %    ABS. NEUTROPHILS 3.6 1.7 - 8.2 K/UL    ABS. LYMPHOCYTES 1.6 0.5 - 4.6 K/UL    ABS. MONOCYTES 0.6 0.1 - 1.3 K/UL    ABS. EOSINOPHILS 0.1 0.0 - 0.8 K/UL    ABS. BASOPHILS 0.0 0.0 - 0.2 K/UL    ABS. IMM. GRANS. 0.0 0.0 - 0.5 K/UL   PROTHROMBIN TIME + INR    Collection Time: 12/03/19  8:01 PM   Result Value Ref Range    Prothrombin time 13.1 11.7 - 14.5 sec    INR 1.0     METABOLIC PANEL, COMPREHENSIVE    Collection Time: 12/03/19  8:01 PM   Result Value Ref Range    Sodium 145 136 - 145 mmol/L    Potassium 4.0 3.5 - 5.1 mmol/L    Chloride 108 (H) 98 - 107 mmol/L    CO2 32 21 - 32 mmol/L    Anion gap 5 (L) 7 - 16 mmol/L    Glucose 101 (H) 65 - 100 mg/dL    BUN 27 (H) 8 - 23 MG/DL    Creatinine 1.34 0.8 - 1.5 MG/DL    GFR est AA >60 >60 ml/min/1.73m2    GFR est non-AA 54 (L) >60 ml/min/1.73m2    Calcium 9.0 8.3 - 10.4 MG/DL    Bilirubin, total 0.8 0.2 - 1.1 MG/DL    ALT (SGPT) 18 12 - 65 U/L    AST (SGOT) 18 15 - 37 U/L    Alk. phosphatase 58 50 - 136 U/L    Protein, total 7.4 6.3 - 8.2 g/dL    Albumin 3.9 3.2 - 4.6 g/dL    Globulin 3.5 2.3 - 3.5 g/dL    A-G Ratio 1.1 (L) 1.2 - 3.5     PTT    Collection Time: 12/03/19  8:01 PM   Result Value Ref Range    aPTT 28.0 24.7 - 39.8 SEC   EKG, 12 LEAD, INITIAL    Collection Time: 12/03/19  8:26 PM   Result Value Ref Range    Ventricular Rate 69 BPM    Atrial Rate 69 BPM    P-R Interval 158 ms    QRS Duration 102 ms    Q-T Interval 380 ms    QTC Calculation (Bezet) 407 ms    Calculated P Axis 61 degrees    Calculated R Axis 10 degrees    Calculated T Axis -10 degrees    Diagnosis       !! AGE AND GENDER SPECIFIC ECG ANALYSIS !!   Normal sinus rhythm  Normal ECG  When compared with ECG of 25-MAY-2015 16:20,  Premature ventricular complexes are no longer Present     URINALYSIS W/ RFLX MICROSCOPIC    Collection Time: 12/03/19  9:09 PM   Result Value Ref Range    Color YELLOW Appearance CLEAR      Specific gravity 1.008 1.001 - 1.023      pH (UA) 7.5 5.0 - 9.0      Protein NEGATIVE  NEG mg/dL    Glucose NEGATIVE  mg/dL    Ketone NEGATIVE  NEG mg/dL    Bilirubin NEGATIVE  NEG      Blood TRACE (A) NEG      Urobilinogen 0.2 0.2 - 1.0 EU/dL    Nitrites NEGATIVE  NEG      Leukocyte Esterase NEGATIVE  NEG      WBC 0 0 /hpf    RBC 0-3 0 /hpf    Epithelial cells 0 0 /hpf    Bacteria 0 0 /hpf    Casts 0 0 /lpf     Imaging /Procedures /Studies     Assessment and Plan:      Active Hospital Problems    Diagnosis Date Noted    TIA (transient ischemic attack) 12/03/2019    Hypertensive emergency 12/03/2019       PLAN  TIA  - May be 2/2 elevated BP  - Symptoms have resolved at this time, so pt is not a tPA candidate  - Will check MRI brain, carotid dopplers, echo and monitor pt on cardiac tele  - Aspirin and statin initiated    Hypertensive Emergency  - Could be cause of neuro symptoms  - BP has improved (did not require initiation of cardene as ordered by ER)  - Continue home valsartan and prn clonidine  - Labetalol prn for elevated pressures    H/o Hypoglycemia  - BG is fine on labs obtained in ER  - Unfortunately, he was unable to check his BG at home when this episode started  - Pt and wife think that this may be the cause of his symptoms  - Will recheck BG in AM (pt currently eating a sandwich and fruit tray)      Anticipated discharge: 1-2 days, pending workup and clinical course    Signed By: Tomas Cleaning MD     December 3, 2019

## 2019-12-04 NOTE — ED NOTES
Patient resting in bed. Patient on continuous monitoring. No neurological changes noted. Family at bedside. Call light within reach.

## 2019-12-04 NOTE — ED NOTES
Report from [facility] ([place], [place]) RN. Caprice See RN to assume care of this pt at this time.

## 2019-12-04 NOTE — ED NOTES
Patient arrives via EMS from home. Patient went shopping around 1330 and when he came home at [address] [facility] patient presented with expressive aphasia per wife. Patient was also confused. Patient unaware of where he was and what was happening. When EMS arrived patient was complaining of numbness to right face. Patient was also complaining of dizziness. Both of these symptoms resolved. Only symptom upon arrival to ED is expressive aphasia. Patient is having difficulty getting words out. Per patient he has had this issue getting his words out for several weeks. Patient A&O x4 when patient arrives to ED. Patient hypertensive with EMS at 215/110. Patient complains of headache 7/10. Patient states he takes medication for BP and is compliant. Patient denies blood thinners. Patient went to [facility] earlier this year and had a similar episode with no diagnosis per wife.

## 2019-12-04 NOTE — PROGRESS NOTES
TRANSFER - IN REPORT:    Verbal report received from SOWMYA IGNACIO on Malgorzata Moya  being received from ER for routine progression of care      Report consisted of patients Situation, Background, Assessment and   Recommendations(SBAR). Information from the following report(s) ED Summary was reviewed with the receiving nurse. Opportunity for questions and clarification was provided.

## 2019-12-04 NOTE — ROUTINE PROCESS
TRANSFER - OUT REPORT: 
 
Verbal report given to 8th floor RN on Flavia Mcwilliams  being transferred to [facility] [phone] for routine progression of care Report consisted of patients Situation, Background, Assessment and  
Recommendations(SBAR). Information from the following report(s) SBAR was reviewed with the receiving nurse. Lines:  
Peripheral IV 12/03/19 Right Forearm (Active) Site Assessment Clean, dry, & intact 12/3/2019  8:14 PM  
Phlebitis Assessment 0 12/3/2019  8:14 PM  
Infiltration Assessment 0 12/3/2019  8:14 PM  
Dressing Status Clean, dry, & intact 12/3/2019  8:14 PM  
Dressing Type 4 X 4 12/3/2019  8:14 PM  
Hub Color/Line Status Green 12/3/2019  8:14 PM  
Alcohol Cap Used Yes 12/3/2019  8:14 PM  
   
Peripheral IV 12/03/19 Left Forearm (Active) Site Assessment Clean, dry, & intact 12/3/2019  8:14 PM  
Phlebitis Assessment 0 12/3/2019  8:14 PM  
Infiltration Assessment 0 12/3/2019  8:14 PM  
Dressing Status Clean, dry, & intact 12/3/2019  8:14 PM  
Dressing Type 4 X 4 12/3/2019  8:14 PM  
Hub Color/Line Status Green 12/3/2019  8:14 PM  
Alcohol Cap Used Yes 12/3/2019  8:14 PM  
  
 
Opportunity for questions and clarification was provided. Patient transported with: 
 Gennio

## 2019-12-04 NOTE — ED NOTES
Patient continues with hypertension. Orders received. Patient continues with mild to moderate aphasia. Patient continues to be alert and oriented x 4. Family at bedside. Patient on cardiac monitoring.

## 2019-12-04 NOTE — PROGRESS NOTES
Problem: Mobility Impaired (Adult and Pediatric)  Goal: *Acute Goals and Plan of Care (Insert Text)  Description  Discharge Goals:  (1.)Mr. Marquise Avalos will move from supine to sit and sit to supine  with INDEPENDENT within 7 treatment day(s). (2.)Mr. Marquise Avalos will transfer from bed to chair and chair to bed with INDEPENDENT using the least restrictive device within 7 treatment day(s). (3.)Mr. Corrales will ambulate with INDEPENDENT for 500+ feet with the least restrictive device within 7 treatment day(s). (4.)Mr. Corrales will ascend/descend 10 steps with use of railing with SUPERVISION within 7 treatment days. Outcome: Progressing Towards Goal       PHYSICAL THERAPY: Initial Assessment and AM 12/4/2019  INPATIENT: PT Visit Days : 1  [place] / [place] / Product Type: Medicare /       NAME/AGE/GENDER: Karan Valera is a 81 y.o. male   PRIMARY DIAGNOSIS: Hypertensive emergency [I16.1]  TIA (transient ischemic attack) [G45.9]  Hypertensive emergency [I16.1]  TIA (transient ischemic attack) [G45.9] Hypertensive emergency Hypertensive emergency        ICD-10: Treatment Diagnosis:    Generalized Muscle Weakness (M62.81)  Difficulty in walking, Not elsewhere classified (R26.2)   Precaution/Allergies:  Amiodarone; Benazepril; Hydrochlorothiazide; and Lisinopril      ASSESSMENT:     Mr. Marquise Avalos is supine in bed upon contact with family at bedside and agreeable to PT evaluation and treatment this morning. Pt lives with his wife in 2 story home with 2 steps to enter. Pt with 10-12 steps to get to bedroom/bathroom on second floor. Pt is independent with gait and ADLs, 0 falls, and drives. Pt with reports of LE weakness that has been ongoing for 1 year now without improvement. Pt reports walking on treadmill daily for exercise. Pt reports that he has seen neurologist who recommended OP PT. Pt went to OP PT for 2 weeks before discontinuing due to going on vacation for 1 month.  Pt transitioned supine to sit with supervision and performed STS with SBA-supervision. Pt ambulated 250 ft in hallway with CGA. Pt ambulates with narrowed ANABELA, shuffling gait and slow pace but no unsteadiness noted. Pt with complaints of just feeling weak. Pt ambulated into bathroom and performed all toileting independently. Pt ambulated out of bathroom and bed to ER room with CGA. Pt returned to supine with supervision. Pt left with all needs met and within reach. Halina Meyers will benefit from skilled PT (medically necessary) to address decreased strength, decreased balance, decreased functional tolerance affecting participation in basic ADLs and functional tasks. This section established at most recent assessment   PROBLEM LIST (Impairments causing functional limitations):  Decreased Strength  Decreased Ambulation Ability/Technique  Decreased Balance  Decreased Activity Tolerance  Increased Fatigue  Decreased Potter with Home Exercise Program   INTERVENTIONS PLANNED: (Benefits and precautions of physical therapy have been discussed with the patient.)  Balance Exercise  Bed Mobility  Family Education  Gait Training  Home Exercise Program (HEP)  Neuromuscular Re-education/Strengthening  Therapeutic Activites  Therapeutic Exercise/Strengthening  Transfer Training     TREATMENT PLAN: Frequency/Duration: 3 times a week for duration of hospital stay  Rehabilitation Potential For Stated Goals: Good     REHAB RECOMMENDATIONS (at time of discharge pending progress):    Placement:   It is my opinion, based on this patient's performance to date, that Mr. Marin Pitts may benefit from [facility] [address] after discharge due to the functional deficits listed above that are likely to improve with skilled rehabilitation because because he/she is able to safely attend regular and reoccurring therapy sessions at an outpatient facility and because he/she will benefit from the individualized approach tailored to his/her deficits. Equipment:   None at this time              HISTORY:   History of Present Injury/Illness (Reason for Referral):  See H&P below  Patient is an 81yoM with PMH significant for HTN who presents with facial paresthesias and word-finding difficulties. Patient reports that around 17:30 this afternoon, his wife noticed that he was having problems \"getting words out. \"  He reports associated headaches and some mild confusion. EMS were called and he was found to be significantly hypertensive with SBP > 200. Code S was called upon arrival to ER. Patient symptoms had resolved by time of ER physician evaluation, and tPA was not recommended by tele-Neurology. Hospitalist consulted for admission. Patient's blood pressure improved with hydralazine and clonidine in ER. Patient reports that it was the R side of his face that felt a tingling sensation. He denies upper or lower extremity weakness. He reports that he often has hypoglycemia and did not eat breakfast this morning. Unfortunately, he did not take his BG during this episode at home, but he and his wife feel that this could be a contributing factor.   Past Medical History/Comorbidities:   Mr. Didi Pappas  has a past medical history of Abdominal pain (5/9/2016), Acute prostatitis, Backache, unspecified, Calculus of kidney, Cancer ([address] Diabetes ([address] Diverticulosis (1/14/14), Edema (5/9/2016), Elevated prostate specific antigen (PSA), Essential hypertension, benign, GERD (gastroesophageal reflux disease), H. pylori infection (12/1/14), History of barium enema (3/18/14), History of BPH, Hypertension, Hypertrophy of prostate without urinary obstruction and other lower urinary tract symptoms (LUTS), Impotence of organic origin, Lumbago, Malignant neoplasm of prostate ([address] Microscopic hematuria, Multiple renal cysts (1/14/14), Osteopenia determined by x-ray, Other ill-defined conditions(799.89), PVCs (premature ventricular contractions) (5/9/2016), and SSS (sick [facility] syndrome) [facility] (5/9/2016). Mr. Hillary Kaminski  has a past surgical history that includes hx other surgical; hx hernia repair; hx radical prostatectomy; hx urological; hx tonsil and adenoidectomy; hx cholecystectomy; hx colonoscopy (2010); and hx endoscopy (07/12/2017). Social History/Living Environment:   Home Environment: Private residence  # Steps to Enter: 2  One/Two Story Residence: Two story  # of Interior Steps: 12  Living Alone: No  Support Systems: Spouse/Significant Other/Partner  Patient Expects to be Discharged to[de-identified] Private residence  Current DME Used/Available at Home: None  Prior Level of Function/Work/Activity:  Independent with gait and ADLs, weakness ongoing for 1year now   Number of Personal Factors/Comorbidities that affect the Plan of Care: 3+: HIGH COMPLEXITY   EXAMINATION:   Most Recent Physical Functioning:   Gross Assessment:  AROM: Within functional limits  Strength: Generally decreased, functional  Coordination: Generally decreased, functional               Posture:     Balance:  Sitting: Intact; Without support  Standing: Intact; Without support Bed Mobility:  Supine to Sit: Supervision  Sit to Supine: Supervision  Wheelchair Mobility:     Transfers:  Sit to Stand: Stand-by assistance;Supervision  Stand to Sit: Stand-by assistance;Supervision  Gait:     Base of Support: Narrowed  Speed/Cyndee: Slow;Shuffled  Step Length: Left shortened;Right shortened  Distance (ft): 250 Feet (ft)  Assistive Device: (none)  Ambulation - Level of Assistance: Contact guard assistance  Interventions: Safety awareness training; Tactile cues; Verbal cues      Body Structures Involved:  Bones  Muscles Body Functions Affected:  Cardio  Neuromusculoskeletal  Movement Related Activities and Participation Affected:  General Tasks and Demands  Mobility  Self Care  Domestic Life  Interpersonal Interactions and Relationships  Community, Social and Civic Life   Number of elements that affect the Plan of Care: 4+: HIGH COMPLEXITY   CLINICAL PRESENTATION:   Presentation: Stable and uncomplicated: LOW COMPLEXITY   CLINICAL DECISION MAKIN[address] [place] [id number] AM-PAC 6 Clicks   Basic Mobility Inpatient Short Form  How much difficulty does the patient currently have. .. Unable A Lot A Little None   1. Turning over in bed (including adjusting bedclothes, sheets and blankets)? [] 1   [] 2   [] 3   [x] 4   2. Sitting down on and standing up from a chair with arms ( e.g., wheelchair, bedside commode, etc.)   [] 1   [] 2   [] 3   [x] 4   3. Moving from lying on back to sitting on the side of the bed? [] 1   [] 2   [] 3   [x] 4   How much help from another person does the patient currently need. .. Total A Lot A Little None   4. Moving to and from a bed to a chair (including a wheelchair)? [] 1   [] 2   [] 3   [x] 4   5. Need to walk in hospital room? [] 1   [] 2   [x] 3   [] 4   6. Climbing 3-5 steps with a railing? [] 1   [] 2   [x] 3   [] 4   © , Trustees of [address] [place] [id number], under license to [employer]. All rights reserved      Score:  Initial: 22 Most Recent: X (Date: -- )    Interpretation of Tool:  Represents activities that are increasingly more difficult (i.e. Bed mobility, Transfers, Gait). Medical Necessity:     Patient is expected to demonstrate progress in   strength, balance, coordination, and functional technique   to   decrease assistance required with gait, transfers, and functional mobility. .  Reason for Services/Other Comments:  Patient continues to require skilled intervention due to   decreased strength, decreased balance, decreased functional tolerance affecting participation in basic ADLs and functional tasks.       .   Use of outcome tool(s) and clinical judgement create a POC that gives a: Clear prediction of patient's progress: LOW COMPLEXITY            TREATMENT:   (In addition to Assessment/Re-Assessment sessions the following treatments were rendered)   Pre-treatment Symptoms/Complaints:  LE weakness  Pain: Initial:   Pain Intensity 1: 0  Post Session:  0/10     Therapeutic Activity: (    10 minutes): Therapeutic activities including Bed transfers, Ambulation on level ground to improve mobility, strength, balance, and coordination. Required minimal Safety awareness training; Tactile cues; Verbal cues to promote static and dynamic balance in standing. Braces/Orthotics/Lines/Etc:   O2 Device: Room air  Treatment/Session Assessment:    Response to Treatment:  amb 250 ft with CGA  Interdisciplinary Collaboration:   Physical Therapist  Registered Nurse  After treatment position/precautions:   Supine in bed  Bed/Chair-wheels locked  Bed in low position  Family at bedside   Compliance with Program/Exercises: Will assess as treatment progresses  Recommendations/Intent for next treatment session: \"Next visit will focus on advancements to more challenging activities and reduction in assistance provided\".   Total Treatment Duration:  PT Patient Time In/Time Out  Time In: 1100  Time Out: 700 KOWN'S INgrooves

## 2019-12-05 VITALS
HEIGHT: 69 IN | SYSTOLIC BLOOD PRESSURE: 154 MMHG | TEMPERATURE: 98.1 F | OXYGEN SATURATION: 97 % | BODY MASS INDEX: 24.59 KG/M2 | DIASTOLIC BLOOD PRESSURE: 80 MMHG | HEART RATE: 61 BPM | RESPIRATION RATE: 18 BRPM | WEIGHT: 166 LBS

## 2019-12-05 PROBLEM — I16.1 HYPERTENSIVE EMERGENCY: Status: RESOLVED | Noted: 2019-12-03 | Resolved: 2019-12-05

## 2019-12-05 LAB
GLUCOSE BLD STRIP.AUTO-MCNC: 102 MG/DL (ref 65–100)
GLUCOSE BLD STRIP.AUTO-MCNC: 88 MG/DL (ref 65–100)

## 2019-12-05 PROCEDURE — 82962 GLUCOSE BLOOD TEST: CPT

## 2019-12-05 RX ORDER — GUAIFENESIN 100 MG/5ML
81 LIQUID (ML) ORAL DAILY
Qty: 30 TAB | Refills: 0 | Status: SHIPPED | OUTPATIENT
Start: 2019-12-05 | End: 2021-04-12 | Stop reason: SINTOL

## 2019-12-05 RX ORDER — VALSARTAN 80 MG/1
80 TABLET ORAL DAILY
Qty: 90 TAB | Refills: 3 | Status: SHIPPED | OUTPATIENT
Start: 2019-12-05 | End: 2019-12-11 | Stop reason: SDUPTHER

## 2019-12-05 RX ORDER — ATORVASTATIN CALCIUM 80 MG/1
80 TABLET, FILM COATED ORAL
Qty: 90 TAB | Refills: 3 | Status: SHIPPED | OUTPATIENT
Start: 2019-12-05 | End: 2020-12-10

## 2019-12-05 RX ADMIN — Medication 10 ML: at 06:07

## 2019-12-05 NOTE — PROGRESS NOTES
Patient discharged. No signs of distress. No needs expressed. Escorted to car via wheelchair by Jordan Dash, .

## 2019-12-05 NOTE — DISCHARGE SUMMARY
Discharge Summary     Patient: Ramón Danielson MRN: [id number]  SSN: [id number]    YOB: 1935  Age: 81 y.o. Sex: male       Admit Date: 12/3/2019    Discharge Date: 12/5/2019      Admission Diagnoses: Hypertensive emergency [I16.1]  TIA (transient ischemic attack) [G45.9]  Hypertensive emergency [I16.1]  TIA (transient ischemic attack) [G45.9]    Discharge Diagnoses:   Problem List as of 12/5/2019 Date Reviewed: 12/5/2019          Codes Class Noted - Resolved    * (Principal) CVA (cerebral vascular accident) ([facility] ICD-10-CM: I63.9  ICD-9-CM: 434.91  12/4/2019 - Present        Status post Mohs surgery for squamous cell carcinoma of skin ICD-10-CM: Z98.890, X66.931  ICD-9-CM: V45.89  9/25/2019 - Present        PAD (peripheral artery disease) ([facility] ICD-10-CM: I73.9  ICD-9-CM: 443.9  8/13/2018 - Present    Overview Signed 8/13/2018  9:41 AM by Huang Simmons MD     Complex CTA findings that suggest PAD and 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 (8/2018)             Cervicalgia ICD-10-CM: M54.2  ICD-9-CM: 723.1  5/16/2018 - Present        GERD (gastroesophageal reflux disease) ICD-10-CM: K21.9  ICD-9-CM: 530.81  Unknown - Present        History of BPH ICD-10-CM: Z87.438  ICD-9-CM: V13.89  Unknown - Present        Impotence of organic origin ICD-10-CM: N52.9  ICD-9-CM: 607.84  Unknown - Present        Chronic bilateral low back pain without sciatica ICD-10-CM: M54.5, G89.29  ICD-9-CM: 724.2, 338.29  Unknown - Present        Microscopic hematuria ICD-10-CM: R31.29  ICD-9-CM: 599.72  Unknown - Present        Osteopenia determined by x-ray ICD-10-CM: M85.80  ICD-9-CM: 733.90  Unknown - Present    Overview Signed 6/23/2016  4:12 PM by Roxanne Navarro, T Score -1.0             PVCs (premature ventricular contractions) ICD-10-CM: I49.3  ICD-9-CM: 427.69  5/9/2016 - Present    Overview Signed 5/16/2016  7:47 AM by Janeth Martinez MD     12K/24hrs.   Normal LVEF and no ischemia on stress testing. Started Amiodarone but patient stopped medication. PVCs resolved and repeat monitor with essentially noPVCs.                  SSS (sick sinus syndrome) (HCC) ICD-10-CM: I49.5  ICD-9-CM: 427.81  5/9/2016 - Present        Malignant neoplasm of prostate ([facility] ICD-10-CM: C61  ICD-9-CM: 185  5/19/2015 - Present        Hypertension ICD-10-CM: I10  ICD-9-CM: 401.9  Unknown - Present        DDD (degenerative disc disease), lumbar ICD-10-CM: M51.36  ICD-9-CM: 722.52  3/5/2015 - Present        Multiple renal cysts ICD-10-CM: Q61.02  ICD-9-CM: 753.19  1/14/2014 - Present    Overview Signed 6/23/2016  4:12 PM by Roxanne Miranda     left kidney, by CT             Diverticulosis ICD-10-CM: K57.90  ICD-9-CM: 562.10  1/14/2014 - Present        RESOLVED: TIA (transient ischemic attack) ICD-10-CM: G45.9  ICD-9-CM: 435.9  12/3/2019 - 12/4/2019        RESOLVED: Hypertensive emergency ICD-10-CM: I16.1  ICD-9-CM: 401.9  12/3/2019 - 12/5/2019        RESOLVED: Diabetes ([facility] ICD-10-CM: E11.9  ICD-9-CM: 250.00  Unknown - 8/1/2017    Overview Signed 6/23/2016  4:11 PM by Roxanne Miranda     hypoglycemia             RESOLVED: Cancer ([facility] ICD-10-CM: C80.1  ICD-9-CM: 199.1  Unknown - 8/1/2017    Overview Signed 6/23/2016  4:11 PM by Roxanne Miranda     prostate             RESOLVED: Essential hypertension, benign ICD-10-CM: I10  ICD-9-CM: 401.1  Unknown - 8/29/2017        RESOLVED: Elevated prostate specific antigen (PSA) ICD-10-CM: R97.20  ICD-9-CM: 790.93  Unknown - 7/7/2016        RESOLVED: Acute prostatitis ICD-10-CM: N41.0  ICD-9-CM: 601.0  Unknown - 7/7/2016        RESOLVED: Backache ICD-10-CM: M54.9  ICD-9-CM: 724.5  Unknown - 8/1/2017        RESOLVED: Calculus of kidney ICD-10-CM: N20.0  ICD-9-CM: 592.0  Unknown - 1/4/2018        RESOLVED: Dyspnea on exertion (Chronic) ICD-10-CM: R06.09  ICD-9-CM: 786.09  5/16/2016 - 8/1/2017        RESOLVED: Edema ICD-10-CM: R60.9  ICD-9-CM: 782.3  5/9/2016 - 8/1/2017 RESOLVED: Abdominal pain ICD-10-CM: R10.9  ICD-9-CM: 789.00  5/9/2016 - 7/7/2016        RESOLVED: Bradycardia ICD-10-CM: R00.1  ICD-9-CM: 427.89  3/5/2015 - 8/29/2017        RESOLVED: Benign essential tremor ICD-10-CM: G25.0  ICD-9-CM: 333.1  3/5/2015 - 8/29/2017        RESOLVED: H. pylori infection ICD-10-CM: A04.8  ICD-9-CM: 041.86  12/1/2014 - 8/1/2017    Overview Signed 6/23/2016  4:12 PM by Dayami Gottlieb     s/p treatment             RESOLVED: History of barium enema ICD-10-CM: Q32.111  ICD-9-CM: V45.89  3/18/2014 - 8/1/2017    Overview Signed 6/23/2016  4:12 PM by Dayami Gottlieb     ordered by Dr. Bean Kinsey, GI                    Discharge Condition: Good    Hospital Course: Mr. Alfie Perdue is an 81yoM with PMH significant for HTN, PAD, hx prostate Ca who presented to the ER on 12/3/19 with facial paresthesias and word-finding difficulties. Patient reports that around 17:30 this afternoon, his wife noticed that he was having problems \"getting words out. \"  He reported associated headaches and some mild confusion. EMS were called and he was found to be significantly hypertensive with SBP > 200. Code S was called upon arrival to ER. Patient symptoms had resolved by time of ER physician evaluation, and tPA was not recommended by tele-Neurology. Hospitalist consulted for admission. Patient's blood pressure improved with hydralazine and clonidine in ER. Patient reports that it was the R side of his face that felt a tingling sensation. He denies upper or lower extremity weakness. Pt gradually had improvement of hi symptoms. MRI revealed:  1. Punctate focus of acute infarction within the right putamen. 2. Mild chronic small vessel ischemic changes. 3. Multifocal areas of hemosiderin staining. 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 Pt was started on ASA and high dose statin. Echo was obtained which did not display PFO.  Pt did not have any arrhythmias noted on telemetry during his stay. Pt and his family requested visit from cardiology. He will have outpt visit with them to have event monitor placed. Speech and physical therapy saw the patient in hospital and pt had no focal deficits or issues with dysphagia. Neurology was not consulted. In regards to his CVA, pt is to remain on ASA and Lipitor 80mg daily. His Diovan is to be increased from 40mg to 80mg daily at home. Pt also reported that he has been having difficulty with leg weakness and then pain in his calves with ambulation. States he notices this most when walking longer distances (like with golfing), but sometimes also at rest. Stated that he then would develop a rash on his legs. When further clarified, it sounds like he might just be experiencing rubor. On exam, he had decreased DP pulses and hair growth that stopped just distal to his knee. He has a diagnosis of PAD on his chart, but had not been on antiplatelet or statin therapy (to his knowledge). Encouraged patient to take breaks with ambulation when symptoms occurred and speak with his PCP about further evaluation (if had not been done already). He does carry a history of back surgery. Denies incontinence or saddle anesthesia. Consults: Cardiology    Significant Diagnostic Studies: as per below    Physical exam:  Visit Vitals  /80   Pulse 61   Temp 98.1 °F (36.7 °C)   Resp 18   Ht 5' 9\" (1.753 m)   Wt 75.3 kg (166 lb)   SpO2 97%   BMI 24.51 kg/m²     General:  Alert, cooperative, no distress. Head:  Normocephalic, without obvious abnormality, atraumatic. Eyes:  Conjunctivae/corneas clear. Pupils equal, round, reactive to light. Extraocular movements intact. Lungs:   Clear to auscultation bilaterally. Chest wall:  No tenderness or deformity. Heart:  Regular rate and rhythm, S1, S2 normal, no murmur, click, rub, or gallop. Abdomen:   Soft, non-tender. Bowel sounds normal. No masses. No organomegaly.    Extremities: Extremities normal, atraumatic, no cyanosis or edema. Pulses: 0-1+ DP pulses b/l   Skin: Purple/blue discoloration of b/l feet. Dry skin noted b/l LEs    Neurologic: CNII-XII intact. Normal strength, sensation, and reflexes throughout. Data Review:  I have reviewed all labs, meds, telemetry events, and studies from the last 24 hours:    Recent Results (from the past 24 hour(s))   GLUCOSE, POC    Collection Time: 19  8:14 PM   Result Value Ref Range    Glucose (POC) 137 (H) 65 - 100 mg/dL   GLUCOSE, POC    Collection Time: 19 12:00 AM   Result Value Ref Range    Glucose (POC) 88 65 - 100 mg/dL   GLUCOSE, POC    Collection Time: 19  5:42 AM   Result Value Ref Range    Glucose (POC) 102 (H) 65 - 100 mg/dL        All Micro Results     None          Results for orders placed or performed during the hospital encounter of 19   2D ECHO COMPLETE ADULT (TTE) W OR [address]  [phone]    Transthoracic Echocardiogram  2D, M-mode, Doppler, and Color Doppler    Patient: Eric Rodriguez  MR #: [id number]  : 1935  Age: 81 years  Gender: Male  Study date: 04-Dec-2019  Account #: [de-identified]  Height: 69 in  Weight: 165.7 lb  BSA: 1.91 mï¾²  Status:Routine  Location: ER06  BP: 124/ 64    Allergies: AMIODARONE, BENAZEPRIL, HYDROCHLOROTHIAZIDE, LISINOPRIL    Sonographer:  Moi Bell RDCS  Group:  [facility]  Referring Physician:  Ugo Sosa MD  Reading Physician:  Mckenzie Murray MD [facility] [place]    INDICATIONS: TIA workup. PROCEDURE: This was a routine study. A transthoracic echocardiogram was  performed. The study included complete 2D imaging, M-mode, complete spectral  Doppler, and color Doppler. Intravenous contrast (agitated saline) was  administered. Image quality was adequate.     LEFT VENTRICLE: Size was normal. Systolic function was normal. Ejection  fraction was estimated in the range of 55 % to 60 %. There were no regional  wall motion abnormalities. Wall thickness was mildly increased. Avg. E/e'=  9.25. Left ventricular diastolic function parameters were normal.    RIGHT VENTRICLE: The size was normal. Systolic function was normal. Estimated  peak pressure was in the range of 30-35 mmHg. LEFT ATRIUM: The atrium was mildly dilated. ATRIAL SEPTUM: Contrast injection was performed. There was no right-to-left  shunt, with provocative maneuvers to increase right atrial pressure. RIGHT ATRIUM: Size was normal.    SYSTEMIC VEINS: IVC: The inferior vena cava was dilated. Respirophasic   changes  in dimension were absent. AORTIC VALVE: The valve was trileaflet. Leaflets exhibited mild sclerosis. There was no evidence for stenosis. There was mild regurgitation. MITRAL VALVE: Valve structure was normal. There was no evidence for stenosis. There was trivial regurgitation. TRICUSPID VALVE: The valve structure was normal. There was no evidence for  stenosis. There was mild regurgitation. PULMONIC VALVE: The valve structure was normal. There was no evidence for  stenosis. There was trivial regurgitation. PERICARDIUM: There was no pericardial effusion. AORTA: The root exhibited normal size. SUMMARY:    -  Left ventricle: Systolic function was normal. Ejection fraction was  estimated in the range of 55 % to 60 %. There were no regional wall motion  abnormalities. Wall thickness was mildly increased. -  Left atrium: The atrium was mildly dilated. -  Atrial septum: Contrast injection was performed. There was no   right-to-left  shunt, with provocative maneuvers to increase right atrial pressure. -  Inferior vena cava, hepatic veins: The inferior vena cava was dilated. Respirophasic changes in dimension were absent.    -  Tricuspid valve: There was mild regurgitation.     SYSTEM MEASUREMENT TABLES    2D mode  AoR Diam (2D): 3.9 cm  Left Atrium Systolic Volume Index; Method of Disks, Biplane; 2D mode;: 37.2  ml/m2  IVS/LVPW (2D): 0.9  IVSd (2D): 1.1 cm  LVIDd (2D): 4.4 cm  LVIDs (2D): 2.6 cm  LVOT Area (2D): 3.8 cm2  LVPWd (2D): 1.2 cm  RVIDd (2D): 3.1 cm    Tissue Doppler Imaging  LV Peak Early Mendoza Tissue Preston; Lateral MA (TDI): 8 cm/s  LV Peak Early Mendoza Tissue Preston; Medial MA (TDI): 4.9 cm/s    Unspecified Scan Mode  Peak Grad; Mean; Antegrade Flow: 10 mm[Hg]  Vmax; Antegrade Flow: 158 cm/s  LVOT Diam: 2.2 cm  MV Peak Preston/LV Peak Tissue Preston E-Wave; Lateral MA: 7  MV Peak Preston/LV Peak Tissue Preston E-Wave; Medial MA: 11.5    Prepared and signed by    Dane Marinelli MD [facility] [place]  Signed 04-Dec-2019 14:41:48         Current Meds:  Current Facility-Administered Medications   Medication Dose Route Frequency    meclizine (ANTIVERT) tablet 25 mg  25 mg Oral TID PRN    fluticasone propionate (FLONASE) 50 mcg/actuation nasal spray 2 Spray  2 Spray Both Nostrils DAILY    [Held by provider] cloNIDine HCl (CATAPRES) tablet 0.1 mg  0.1 mg Oral DAILY PRN    ibuprofen (MOTRIN) tablet 200 mg  200 mg Oral Q6H PRN    magnesium oxide (MAG-OX) tablet 400 mg  400 mg Oral BID    [Held by provider] valsartan (DIOVAN) tablet 80 mg  80 mg Oral DAILY    sodium chloride (NS) flush 5-40 mL  5-40 mL IntraVENous Q8H    sodium chloride (NS) flush 5-40 mL  5-40 mL IntraVENous PRN    aspirin chewable tablet 81 mg  81 mg Oral DAILY    labetalol (NORMODYNE;TRANDATE) injection 5 mg  5 mg IntraVENous Q10MIN PRN    acetaminophen (TYLENOL) tablet 650 mg  650 mg Oral Q6H PRN    famotidine (PEPCID) tablet 20 mg  20 mg Oral DAILY    atorvastatin (LIPITOR) tablet 80 mg  80 mg Oral QHS    tuberculin injection 5 Units  5 Units IntraDERMal ONCE    hydrALAZINE (APRESOLINE) 20 mg/mL injection 10 mg  10 mg IntraVENous Q6H PRN       Other Studies (last 24 hours):  Cta Head Neck W Wo Cont    Result Date: 12/5/2019  Title:  CT arteriogram of the neck and head. Indication: Stroke.  Technique: Axial images of the neck and head were obtained after the uneventful administration of intravenous iodinated contrast media. Contrast was used to best identify the arterial structures. Images were reviewed on a separate, free standing, three-dimensional workstation as per the referring physicians request.  All stenosis percentages derived by comparing the narrowest segment with the distal Internal Carotid Artery luminal diameter, as described in the [place] [employer] Symptomatic Carotid Endarterectomy Trial (NASCET) criteria. All CT scans at this facility are performed using dose reduction/dose modulation techniques, as appropriate the performed exam, including the following: Automated Exposure Control; Adjustment of the mA and/or kV according to patient size (this includes techniques or standardized protocols for targeted exams where dose is matched to indication/reason for exam); and Use of Iterative Reconstruction Technique. Comparison: None. Findings: Lungs: Emphysema. Dependent opacities suggest atelectasis Soft Tissues: Mild paranasal mucosal sinus thickening Cervical Spine: Degenerative changes Aorta: Conventional 3 vessel arch with mild scattered plaque Great Vessels: Patent Right ICA: Mild hard plaque at the bulb and the cavernous sinus % Stenosis: 0 Right MCA: Patent Right CARMEN: Patent Left ICA: Mild plaque at the bulb and the cavernous sinus. % Stenosis: 0 Left MCA: Patent Left CARMEN: Patent Right Vertebral: Patent Left Vertebral: Patent Dominance: Right Basilar: Patent Right PCA: Patent Left PCA: Patent Other Vascular: Negative     Impression: No evidence of large vessel occlusion. Disposition: home    Discharge Medications:   Current Discharge Medication List      START taking these medications    Details   aspirin 81 mg chewable tablet Take 1 Tab by mouth daily. Qty: 30 Tab, Refills: 0      atorvastatin (LIPITOR) 80 mg tablet Take 1 Tab by mouth nightly.   Qty: 90 Tab, Refills: 3         CONTINUE these medications which have CHANGED    Details   valsartan (DIOVAN) 80 mg tablet Take 1 Tab by mouth daily. Qty: 90 Tab, Refills: 3    Associated Diagnoses: Essential hypertension         CONTINUE these medications which have NOT CHANGED    Details   fish oil-dha-epa 1,200-144-216 mg cap Take  by mouth. acetylglucosamine (N-ACETYL-ALPHA-D-GLUCOSAMINE) by Does Not Apply route. diclofenac EC (VOLTAREN) 25 mg EC tablet Take  by mouth two (2) times a day. docusate sodium (STOOL SOFTENER) 100 mg tab Take  by mouth. OTHER Organic Smooth Move for constipation      acetaminophen (TYLENOL) 325 mg tablet Take  by mouth every four (4) hours as needed for Pain. ibuprofen (ADVIL) 200 mg tablet Take 200 mg by mouth every six (6) hours as needed for Pain. furosemide (LASIX) 20 mg tablet Once daily if needed for swelling of the legs  Qty: 30 Tab, Refills: 0    Associated Diagnoses: Essential hypertension      permethrin (ACTICIN) 5 % topical cream apply sparingly as directed  Qty: 60 g, Refills: 0      magnesium oxide (MAG-OX) 400 mg tablet TAKE 1 TAB BY MOUTH TWO (2) TIMES A DAY. Qty: 60 Tab, Refills: 5      raNITIdine (ZANTAC) 150 mg tablet Take 1 Tab by mouth two (2) times a day. Qty: 60 Tab, Refills: 5    Associated Diagnoses: Gastroesophageal reflux disease with esophagitis      fluticasone propionate (FLONASE) 50 mcg/actuation nasal spray 2 Sprays by Both Nostrils route daily. Qty: 1 Bottle, Refills: 5      triamcinolone acetonide (KENALOG) 0.025 % topical cream Apply  to affected area two (2) times a day. use thin layer      betamethasone dipropionate (DIPROLENE) 0.05 % ointment Apply  to affected area two (2) times a day. fexofenadine (ALLEGRA) 180 mg tablet Take 180 mg by mouth daily as needed for Allergies. hydrocortisone (HYTONE) 2.5 % topical cream Apply  to affected area two (2) times a day.  use thin layer  Qty: 30 g, Refills: 0      cholecalciferol, vitamin D3, (VITAMIN D3) 2,000 unit tab Take  by mouth. TURMERIC PO Take  by mouth. co-enzyme Q-10 (CO Q-10) 100 mg capsule Take 100 mg by mouth daily. folic acid-vit Z9-UBV Q45 2.2-25-1 mg tab Take  by mouth.      meclizine (ANTIVERT) 25 mg tablet Take 1 Tab by mouth three (3) times daily as needed. Qty: 90 Tab, Refills: 2    Associated Diagnoses: Vertigo      nystatin-triamcinolone (MYCOLOG II) topical cream Apply  to affected area two (2) times daily as needed for Skin Irritation. Qty: 30 g, Refills: 1         STOP taking these medications       cloNIDine HCl (CATAPRES) 0.1 mg tablet Comments:   Reason for Stopping:               Activity: Activity as tolerated  Diet: Cardiac Diet  Wound Care: None needed    Follow-up Appointments   Procedures    FOLLOW UP VISIT Appointment in: One Week With PCP     With PCP     Standing Status:   Standing     Number of Occurrences:   1     Order Specific Question:   Appointment in     Answer:    One Week       Signed By: SUSAN Wagner     December 5, 2019

## 2019-12-05 NOTE — DISCHARGE INSTRUCTIONS
Please make sure to follow-up with cardiology for monitor placement. New medications -   Aspirin 81mg daily (can be taken over the counter)  Lipitor 80mg every evening (new prescription)  Diovan (valsartan) 80 mg daily (this is a dose increase)    Please make sure to mention your leg weakness and pain to your primary care doctor. This is likely claudication from peripheral arterial disease. Stroke: After Your Visit     Your Care Instructions     You have had a stroke. Risk factors for stroke include being overweight, smoking, and sedentary lifestyle. This means that the blood flow to a part of your brain was blocked for some time, which damages the nerve cells in that part of the brain. The part of your body controlled by that part of your brain may not function properly now. The brain is an amazing organ that can heal itself to some degree. The stroke you had damaged part of your brain, but other parts of your brain may take over in some way for the damaged areas. You have already started this process. Going home may be hard for you and your family. The more you can try to do for yourself, the better. Remember to take each day one at a time. Follow-up care is a key part of your treatment and safety. Be sure to make and go to all appointments, and call your doctor if you are having problems. Its also a good idea to know your test results and keep a list of the medicines you take. How can you care for yourself at home? Enter a stroke rehabilitation (rehab) program, if your doctor recommends it. Physical, speech, and occupational therapies can help you manage bathing, dressing, eating, and other basics of daily living. Eat a heart-healthy diet that is low in cholesterol, saturated fat, and salt. Eat lots of fresh fruits and vegetables and foods high in fiber. Increase your activities slowly. Take short rest breaks when you get tired. Gradually increase the amount you walk.  Start out by walking a little more than you did the day before. Do not drive until your doctor says it is okay. It is normal to feel sad or depressed after a stroke. If the blues last, talk to your doctor. If you are having problems with urine leakage, go to the bathroom at regular times, including when you first wake up and at bedtime. Also, limit fluids after dinner. If you are constipated, drink plenty of fluids, enough so that your urine is light yellow or clear like water. If you have kidney, heart, or liver disease and have to limit fluids, talk with your doctor before you increase the amount of fluids you drink. Set up a regular time for using the toilet. If you continue to have constipation, your doctor may suggest using a bulking agent, such as Metamucil, or a stool softener, laxative, or enema. Medicines  Take your medicines exactly as prescribed. Call your doctor if you think you are having a problem with your medicine. You may be taking several medicines. ACE (angiotensin-converting enzyme) inhibitors, angiotensin II receptor blockers (ARBs), beta-blockers, diuretics (water pills), and calcium channel blockers control your blood pressure. Statins help lower cholesterol. Your doctor may also prescribe medicines for depression, pain, sleep problems, anxiety, or agitation. If your doctor has given you medicine that prevents blood clots, such as warfarin (Coumadin), aspirin combined with extended-release dipyridamole (Aggrenox), clopidogrel (Plavix), or aspirin to prevent another stroke, you should:  Tell your dentist, pharmacist, and other health professionals that you take these medicines. Watch for unusual bruising or bleeding, such as blood in your urine, red or black stools, or bleeding from your nose or gums. Get regular blood tests to check your clotting time if you are taking Coumadin. Wear medical alert jewelry that says you take blood thinners. You can buy this at most [employer].   Do not take any over-the-counter medicines or herbal products without talking to your doctor first.  If you take birth control pills or hormone replacement therapy, talk to your doctor about whether they are right for you. For family members and caregivers  Make the home safe. Set up a room so that your loved one does not have to climb stairs. Be sure the bathroom is on the same floor. Move throw rugs and furniture that could cause falls, and make sure that the lighting is good. Put grab bars and seats in tubs and showers. Find out what your loved one can do and what he or she needs help with. Try not to do things for your loved one that your loved one can do on his or her own. Help him or her learn and practice new skills. Visit and talk with your loved one often. Try doing activities together that you both enjoy, such as playing cards or board games. Keep in touch with your loved one's friends as much as you can, and encourage them to visit. Take care of yourself. Do not try to do everything yourself. Ask other family members to help. Eat well, get enough rest, and take time to do things that you enjoy. Keep up with your own doctor visits, and make sure to take your medicines regularly. Get out of the house as much as you can. Join a local support group. Find out if you qualify for home health care visits to help with rehab or for adult day care. When should you call for help? Call 911 anytime you think you may need emergency care. For example, call if:  You have signs of another stroke. These may include:  Sudden numbness, paralysis, or weakness in your face, arm, or leg, especially on only one side of your body. New problems with walking or balance. Sudden vision changes. Drooling or slurred speech. New problems speaking or understanding simple statements, or you feel confused. A sudden, severe headache that is different from past headaches. Call 911 even if these symptoms go away in a few minutes.   You cough up blood. You vomit blood or what looks like coffee grounds. You pass maroon or very bloody stools. Call your doctor now or seek immediate medical care if:  You have new bruises or blood spots under your skin. You have a nosebleed. Your gums bleed when you brush your teeth. You have blood in your urine. Your stools are black and tarlike or have streaks of blood. You have vaginal bleeding when you are not having your period, or heavy period bleeding. You have new symptoms that may be related to your stroke, such as falls or trouble swallowing. Watch closely for changes in your health, and be sure to contact your doctor if you have any problems. Where can you learn more? Go to [employer]be    Enter C294  in the search box to learn more about \"Stroke: After Your Visit\". © 7784-5974 Healthwise, [employer]. Care instructions adapted under license by [address] [place] [address] [employer] (which disclaims liability or warranty for this information). This care instruction is for use with your licensed healthcare professional. If you have questions about a medical condition or this instruction, always ask your healthcare professional. Opal Pap any warranty or liability for your use of this information. Patient Education        Transient Ischemic Attack: Care Instructions  Your Care Instructions    A transient ischemic attack (TIA) is when blood flow to a part of your brain is blocked for a short time. A TIA is like a stroke but usually lasts only a few minutes. A TIA does not cause lasting brain damage. Any vision problems, slurred speech, or other symptoms usually go away in 10 to 20 minutes. But they may last for up to 24 hours. TIAs are often warning signs of a stroke. Some people who have a TIA may have a stroke in the future. A stroke can cause symptoms like those of a TIA. But a stroke causes lasting damage to your brain. You can take steps to help prevent a stroke.  One thing you can do is get early treatment. If you have other new symptoms, or if your symptoms do not get better, go back to the emergency room or call your doctor right away. Getting treatment right away may prevent long-term brain damage caused by a stroke. The doctor has checked you carefully, but problems can develop later. If you notice any problems or new symptoms, get medical treatment right away. Follow-up care is a key part of your treatment and safety. Be sure to make and go to all appointments, and call your doctor if you are having problems. It's also a good idea to know your test results and keep a list of the medicines you take. How can you care for yourself at home? Medicines    · Be safe with medicines. Take your medicines exactly as prescribed. Call your doctor if you think you are having a problem with your medicine.     · If you take a blood thinner, such as aspirin, be sure you get instructions about how to take your medicine safely. Blood thinners can cause serious bleeding problems.     · Call your doctor if you are not able to take your medicines for any reason.     · Do not take any over-the-counter medicines or herbal products without talking to your doctor first.     · If you take birth control pills or hormone therapy, talk to your doctor. Ask if these treatments are right for you.    Lifestyle changes    · Do not smoke. If you need help quitting, talk to your doctor about stop-smoking programs and medicines.     · Be active. If your doctor recommends it, get more exercise. Walking is a good choice. Bit by bit, increase the amount you walk every day. Try for at least 30 minutes on most days of the week. You also may want to swim, bike, or do other activities.     · Eat heart-healthy foods. These include fruits, vegetables, high-fiber foods, fish, and foods that are low in sodium, saturated fat, and trans fat.     · Stay at a healthy weight.  Lose weight if you need to.     · Limit alcohol to 2 drinks a day for men and 1 drink a day for women.    Staying healthy    · Manage other health problems such as diabetes, high blood pressure, and high cholesterol.     · Get the flu vaccine every year. When should you call for help? Call 911 anytime you think you may need emergency care. For example, call if:    · You have new or worse symptoms of a stroke. These may include:  ? Sudden numbness, tingling, weakness, or loss of movement in your face, arm, or leg, especially on only one side of your body. ? Sudden vision changes. ? Sudden trouble speaking. ? Sudden confusion or trouble understanding simple statements. ? Sudden problems with walking or balance. ? A sudden, severe headache that is different from past headaches. Call 911 even if these symptoms go away in a few minutes.     · You feel like you are having another TIA.    Watch closely for changes in your health, and be sure to contact your doctor if you have any problems. Where can you learn more? Go to http://rose marie-victoria.info/. Enter [phone] [id number] [id number] in the search box to learn more about \"Transient Ischemic Attack: Care Instructions. \"  Current as of: September 26, 2018  Content Version: 12.2  © 3014-0601 [employer]. Care instructions adapted under license by [employer] (which disclaims liability or warranty for this information). If you have questions about a medical condition or this instruction, always ask your healthcare professional. [place] [address] any warranty or liability for your use of this information. Patient Education        High Blood Pressure: Care Instructions  Overview    It's normal for blood pressure to go up and down throughout the day. But if it stays up, you have high blood pressure. Another name for high blood pressure is hypertension. Despite what a lot of people think, high blood pressure usually doesn't cause headaches or make you feel dizzy or lightheaded. It usually has no symptoms. But it does increase your risk of stroke, heart attack, and other problems. You and your doctor will talk about your risks of these problems based on your blood pressure. Your doctor will give you a goal for your blood pressure. Your goal will be based on your health and your age. Lifestyle changes, such as eating healthy and being active, are always important to help lower blood pressure. You might also take medicine to reach your blood pressure goal.  Follow-up care is a key part of your treatment and safety. Be sure to make and go to all appointments, and call your doctor if you are having problems. It's also a good idea to know your test results and keep a list of the medicines you take. How can you care for yourself at home? Medical treatment  · If you stop taking your medicine, your blood pressure will go back up. You may take one or more types of medicine to lower your blood pressure. Be safe with medicines. Take your medicine exactly as prescribed. Call your doctor if you think you are having a problem with your medicine. · Talk to your doctor before you start taking aspirin every day. Aspirin can help certain people lower their risk of a heart attack or stroke. But taking aspirin isn't right for everyone, because it can cause serious bleeding. · See your doctor regularly. You may need to see the doctor more often at first or until your blood pressure comes down. · If you are taking blood pressure medicine, talk to your doctor before you take decongestants or anti-inflammatory medicine, such as ibuprofen. Some of these medicines can raise blood pressure. · Learn how to check your blood pressure at home. Lifestyle changes  · Stay at a healthy weight. This is especially important if you put on weight around the waist. Losing even 10 pounds can help you lower your blood pressure. · If your doctor recommends it, get more exercise. Walking is a good choice.  Bit by bit, increase the amount you walk every day. Try for at least 30 minutes on most days of the week. You also may want to swim, bike, or do other activities. · Avoid or limit alcohol. Talk to your doctor about whether you can drink any alcohol. · Try to limit how much sodium you eat to less than 2,300 milligrams (mg) a day. Your doctor may ask you to try to eat less than 1,500 mg a day. · Eat plenty of fruits (such as bananas and oranges), vegetables, legumes, whole grains, and low-fat dairy products. · Lower the amount of saturated fat in your diet. Saturated fat is found in animal products such as milk, cheese, and meat. Limiting these foods may help you lose weight and also lower your risk for heart disease. · Do not smoke. Smoking increases your risk for heart attack and stroke. If you need help quitting, talk to your doctor about stop-smoking programs and medicines. These can increase your chances of quitting for good. When should you call for help? Call  911 anytime you think you may need emergency care. This may mean having symptoms that suggest that your blood pressure is causing a serious heart or blood vessel problem. Your blood pressure may be over 180/120.   For example, call  911 if:    · You have symptoms of a heart attack. These may include:  ? Chest pain or pressure, or a strange feeling in the chest.  ? Sweating. ? Shortness of breath. ? Nausea or vomiting. ? Pain, pressure, or a strange feeling in the back, neck, jaw, or upper belly or in one or both shoulders or arms. ? Lightheadedness or sudden weakness. ? A fast or irregular heartbeat.     · You have symptoms of a stroke. These may include:  ? Sudden numbness, tingling, weakness, or loss of movement in your face, arm, or leg, especially on only one side of your body. ? Sudden vision changes. ? Sudden trouble speaking. ? Sudden confusion or trouble understanding simple statements. ? Sudden problems with walking or balance.   ? A sudden, severe headache that is different from past headaches.     · You have severe back or belly pain.    Do not wait until your blood pressure comes down on its own. Get help right away.   Call your doctor now or seek immediate care if:    · Your blood pressure is much higher than normal (such as 180/120 or higher), but you don't have symptoms.     · You think high blood pressure is causing symptoms, such as:  ? Severe headache.  ? Blurry vision.    Watch closely for changes in your health, and be sure to contact your doctor if:    · Your blood pressure measures higher than your doctor recommends at least 2 times. That means the top number is higher or the bottom number is higher, or both.     · You think you may be having side effects from your blood pressure medicine. Where can you learn more? Go to http://rose marie-victoria.info/. Enter P373 in the search box to learn more about \"High Blood Pressure: Care Instructions. \"  Current as of: April 9, 2019  Content Version: 12.2  © 3603-0940 [employer]. Care instructions adapted under license by [employer] (which disclaims liability or warranty for this information). If you have questions about a medical condition or this instruction, always ask your healthcare professional. [place] [address] any warranty or liability for your use of this information. DISCHARGE SUMMARY from Nurse    PATIENT INSTRUCTIONS:    After general anesthesia or intravenous sedation, for 24 hours or while taking prescription Narcotics:  · Limit your activities  · Do not drive and operate hazardous machinery  · Do not make important personal or business decisions  · Do  not drink alcoholic beverages  · If you have not urinated within 8 hours after discharge, please contact your surgeon on call.     Report the following to your surgeon:  · Excessive pain, swelling, redness or odor of or around the surgical area  · Temperature over 100.5  · Nausea and vomiting lasting longer than 4 hours or if unable to take medications  · Any signs of decreased circulation or nerve impairment to extremity: change in color, persistent  numbness, tingling, coldness or increase pain  · Any questions    What to do at Home:    *  Please give a list of your current medications to your Primary Care Provider. *  Please update this list whenever your medications are discontinued, doses are      changed, or new medications (including over-the-counter products) are added. *  Please carry medication information at all times in case of emergency situations. These are general instructions for a healthy lifestyle:    No smoking/ No tobacco products/ Avoid exposure to second hand smoke  Surgeon General's Warning:  Quitting smoking now greatly reduces serious risk to your health. Obesity, smoking, and sedentary lifestyle greatly increases your risk for illness    A healthy diet, regular physical exercise & weight monitoring are important for maintaining a healthy lifestyle    You may be retaining fluid if you have a history of heart failure or if you experience any of the following symptoms:  Weight gain of 3 pounds or more overnight or 5 pounds in a week, increased swelling in our hands or feet or shortness of breath while lying flat in bed. Please call your doctor as soon as you notice any of these symptoms; do not wait until your next office visit. The discharge information has been reviewed with the patient. The patient verbalized understanding. Discharge medications reviewed with the patient and appropriate educational materials and side effects teaching were provided.   ___________________________________________________________________________________________________________________________________

## 2019-12-05 NOTE — PROGRESS NOTES
Report received from [facility], [facility]. Pt currently off the floor for CTA.

## 2019-12-05 NOTE — PROGRESS NOTES
Care Management Interventions  PCP Verified by CM: Yes  Mode of Transport at Discharge: Other (see comment)(wife to transport)  Transition of Care Consult (CM Consult): Other(home with no needs)  Physical Therapy Consult: Yes  Occupational Therapy Consult: Yes  Speech Therapy Consult: Yes  Current Support Network: Lives with Spouse  Confirm Follow Up Transport: Family  Plan discussed with Pt/Family/Caregiver: Yes  Freedom of Choice Offered: Yes   Resource Information Provided?: No  Discharge Location  Discharge Placement: Home      MSN, CM:  Patient discharged home today with no supportive care needs or orders received for case management.

## 2019-12-05 NOTE — PROGRESS NOTES
Pt resting in bed. On RA. RR even/unlabored. NAD noted. Safety measures in place. Call light within reach. Preparing to give report to oncoming RN.

## 2019-12-05 NOTE — CONSULTS
[facility] CARDIOLOGY  [address] [facility], [address] [facility], [address] [place] [address]  PHONE: [phone]        NAME:  Karan Valera  : 1935  MRN: [id number]     PCP:  Yahir Ruiz MD    SUBJECTIVE:   Karan Valera is a 81 y.o. male seen for a consultation visit regarding the following:     Chief Complaint   Patient presents with    Aphasia        HPI:  Asked to see this pleasant but anxious 81 y.o. male with hx of HTN, SSS, DDD, prostate CA, HFpEF, GERD, PAD, and diverticulosis. He presented on 12/3/2019 with complaints of right facial numbness and tingling with right arm involvement. He had associated paresthesias and word finding difficulties. His last known time of acting at his baseline was 1730 the day of admission. When EMS he was found to have significant HTN with SBP greater than 200 with code S called. TPA was not given per neurology recommendation. CTA showed no acute changes/stenosis/occlusion, but MRI showed punctuate focus of acute infarction of the right putamen area. See full report for further details. He has been taking low dose ARB at home with PRN clonidine use for BP spikes, which may be exacerbating his BP lability at home prior to admit. Per office notes his anxiety frequently drives labile BP.       Echo: 2018 Normal EF, Mild AI, Mild LA, Mild ND  EKG: NSR  MRI of the Brain showed: 1. Punctate focus of acute infarction within the right putamen. 2. Mild chronic small vessel ischemic changes. 3. Multifocal areas of hemosiderin staining. 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 Duplex of Carotid:  showed now significant stenosis  Stress Test:  1. Stress EKG: Normal.  2. SPECT Perfusion Imaging: Normal Perfusion. 3. LV Systolic Function is  normal  CTA head and neck:  No acute occlusion or severe stenosis.      Past Medical History, Past Surgical History, Family history, Social History, and Medications were all reviewed with the patient today and updated as necessary. Allergies   Allergen Reactions    Amiodarone Rash    Benazepril Unknown (comments)    Hydrochlorothiazide Other (comments)     Hand redness and swelling. 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 Normal LVEF and no ischemia on stress testing. Started Amiodarone but patient stopped medication. PVCs resolved and repeat monitor with essentially noPVCs.           SSS (sick sinus syndrome) (HCC)    Malignant neoplasm of prostate ([facility] [facility]    DDD (degenerative disc disease), lumbar    Hypertension    Multiple renal cysts     left kidney, by CT      Diverticulosis       Past Surgical History:   Procedure Laterality Date    HX CHOLECYSTECTOMY      HX COLONOSCOPY      2 polyps benign    HX ENDOSCOPY  2017    HX HERNIA REPAIR      midline incisional    HX OTHER SURGICAL      prostate; melanoma also-back; hernia    HX RADICAL PROSTATECTOMY      HX TONSIL AND ADENOIDECTOMY      HX UROLOGICAL      cystoscopy with urethral dilatation       Family History   Problem Relation Age of Onset    Alzheimer Mother     Prostate Cancer Father        Social History     Tobacco Use    Smoking status: Former Smoker     Packs/day: 2.00     Years: 22.00     Pack years: 44.00     Types: Cigarettes     Last attempt to quit: 3/5/1980     Years since quittin.7    Smokeless tobacco: Never Used   Substance Use Topics    Alcohol use: Yes     Alcohol/week: 0.0 standard drinks     Comment: 2-3 times weekly -one glass of wine       ROS:    Review of Systems   Constitution: Negative for fever and weight loss. HENT: Negative for congestion. Eyes: Negative for visual disturbance. Cardiovascular: Negative for chest pain, palpitations and syncope. Respiratory: Negative for sputum production and wheezing. Hematologic/Lymphatic: Does not bruise/bleed easily. Skin: Negative for poor wound healing and rash. Musculoskeletal: Negative for muscle weakness and myalgias. Gastrointestinal: Negative for hematemesis, hematochezia and melena. Genitourinary: Negative for dysuria and hematuria. Neurological: Positive for difficulty with concentration, numbness, sensory change and weakness. Negative for seizures. Psychiatric/Behavioral: Negative for depression and memory loss. The patient is nervous/anxious. PHYSICAL EXAM:     Visit Vitals  /90   Pulse (!) 54   Temp 98 °F (36.7 °C)   Resp 18   Ht 5' 9\" (1.753 m)   Wt 75.3 kg (166 lb)   SpO2 100%   BMI 24.51 kg/m²        Physical Exam   Constitutional: He is oriented to person, place, and time. He appears well-developed and well-nourished. HENT:   Head: Normocephalic and atraumatic. Mouth/Throat: Oropharynx is clear and moist.   Eyes: No scleral icterus. Neck: Normal range of motion. Neck supple. No JVD present. Carotid bruit is not present. No thyromegaly present. Cardiovascular: Normal rate and regular rhythm. Exam reveals no gallop and no friction rub. No murmur heard. Pulmonary/Chest: Breath sounds normal. He has no wheezes. He has no rales. Abdominal: Soft. He exhibits no distension. There is no tenderness. Musculoskeletal: Normal range of motion. General: No edema. Neurological: He is alert and oriented to person, place, and time. Skin: Skin is warm and dry.    Psychiatric: He has a normal mood and affect. His behavior is normal.       Medical problems and test results were reviewed with the patient today. Recent Results (from the past 672 hour(s))   GLUCOSE, POC    Collection Time: 12/03/19  7:44 PM   Result Value Ref Range    Glucose (POC) 103 (H) 65 - 100 mg/dL   POC PT/INR    Collection Time: 12/03/19  7:54 PM   Result Value Ref Range    Prothrombin time (POC) 17.6 (H) 9.6 - 11.6 SECS    INR (POC) 1.5 (H) 0.9 - 1.2     CBC WITH AUTOMATED DIFF    Collection Time: 12/03/19  8:01 PM   Result Value Ref Range    WBC 5.9 4.3 - 11.1 K/uL    RBC 4.50 4.23 - 5.6 M/uL    HGB 14.2 13.6 - 17.2 g/dL    HCT 42.9 41.1 - 50.3 %    MCV 95.3 79.6 - 97.8 FL    MCH 31.6 26.1 - 32.9 PG    MCHC 33.1 31.4 - 35.0 g/dL    RDW 13.2 11.9 - 14.6 %    PLATELET 557 (L) 617 - 450 K/uL    MPV 10.2 9.4 - 12.3 FL    ABSOLUTE NRBC 0.00 0.0 - 0.2 K/uL    DF AUTOMATED      NEUTROPHILS 60 43 - 78 %    LYMPHOCYTES 27 13 - 44 %    MONOCYTES 10 4.0 - 12.0 %    EOSINOPHILS 2 0.5 - 7.8 %    BASOPHILS 1 0.0 - 2.0 %    IMMATURE GRANULOCYTES 0 0.0 - 5.0 %    ABS. NEUTROPHILS 3.6 1.7 - 8.2 K/UL    ABS. LYMPHOCYTES 1.6 0.5 - 4.6 K/UL    ABS. MONOCYTES 0.6 0.1 - 1.3 K/UL    ABS. EOSINOPHILS 0.1 0.0 - 0.8 K/UL    ABS. BASOPHILS 0.0 0.0 - 0.2 K/UL    ABS. IMM.  GRANS. 0.0 0.0 - 0.5 K/UL   PROTHROMBIN TIME + INR    Collection Time: 12/03/19  8:01 PM   Result Value Ref Range    Prothrombin time 13.1 11.7 - 14.5 sec    INR 1.0     METABOLIC PANEL, COMPREHENSIVE    Collection Time: 12/03/19  8:01 PM   Result Value Ref Range    Sodium 145 136 - 145 mmol/L    Potassium 4.0 3.5 - 5.1 mmol/L    Chloride 108 (H) 98 - 107 mmol/L    CO2 32 21 - 32 mmol/L    Anion gap 5 (L) 7 - 16 mmol/L    Glucose 101 (H) 65 - 100 mg/dL    BUN 27 (H) 8 - 23 MG/DL    Creatinine 1.34 0.8 - 1.5 MG/DL    GFR est AA >60 >60 ml/min/1.73m2    GFR est non-AA 54 (L) >60 ml/min/1.73m2    Calcium 9.0 8.3 - 10.4 MG/DL    Bilirubin, total 0.8 0.2 - 1.1 MG/DL    ALT (SGPT) 18 12 - 65 U/L    AST (SGOT) 18 15 - 37 U/L    Alk. phosphatase 58 50 - 136 U/L    Protein, total 7.4 6.3 - 8.2 g/dL    Albumin 3.9 3.2 - 4.6 g/dL    Globulin 3.5 2.3 - 3.5 g/dL    A-G Ratio 1.1 (L) 1.2 - 3.5     PTT    Collection Time: 12/03/19  8:01 PM   Result Value Ref Range    aPTT 28.0 24.7 - 39.8 SEC   EKG, 12 LEAD, INITIAL    Collection Time: 12/03/19  8:26 PM   Result Value Ref Range    Ventricular Rate 69 BPM    Atrial Rate 69 BPM    P-R Interval 158 ms    QRS Duration 102 ms    Q-T Interval 380 ms    QTC Calculation (Bezet) 407 ms    Calculated P Axis 61 degrees    Calculated R Axis 10 degrees    Calculated T Axis -10 degrees    Diagnosis       !! AGE AND GENDER SPECIFIC ECG ANALYSIS !!   Normal sinus rhythm  Normal ECG  When compared with ECG of 25-MAY-2015 16:20,  Premature ventricular complexes are no longer Present  Confirmed by Rebecca Mcnally ([id number]) on 12/4/2019 7:19:09 AM     URINALYSIS W/ RFLX MICROSCOPIC    Collection Time: 12/03/19  9:09 PM   Result Value Ref Range    Color YELLOW      Appearance CLEAR      Specific gravity 1.008 1.001 - 1.023      pH (UA) 7.5 5.0 - 9.0      Protein NEGATIVE  NEG mg/dL    Glucose NEGATIVE  mg/dL    Ketone NEGATIVE  NEG mg/dL    Bilirubin NEGATIVE  NEG      Blood TRACE (A) NEG      Urobilinogen 0.2 0.2 - 1.0 EU/dL    Nitrites NEGATIVE  NEG      Leukocyte Esterase NEGATIVE  NEG      WBC 0 0 /hpf    RBC 0-3 0 /hpf    Epithelial cells 0 0 /hpf    Bacteria 0 0 /hpf    Casts 0 0 /lpf   LIPID PANEL    Collection Time: 12/04/19  3:41 AM   Result Value Ref Range    LIPID PROFILE          Cholesterol, total 154 <200 MG/DL    Triglyceride 42 35 - 150 MG/DL    HDL Cholesterol 64 (H) 40 - 60 MG/DL    LDL, calculated 81.6 <100 MG/DL    VLDL, calculated 8.4 6.0 - 23.0 MG/DL    CHOL/HDL Ratio 2.4     HEMOGLOBIN A1C WITH EAG    Collection Time: 12/04/19  3:41 AM   Result Value Ref Range    Hemoglobin A1c 5.6 4.8 - 6.0 %    Est. average glucose 114 mg/dL   CBC W/O DIFF    Collection Time: 12/04/19  3:41 AM   Result Value Ref Range    WBC 6.8 4.3 - 11.1 K/uL    RBC 3.84 (L) 4.23 - 5.6 M/uL    HGB 12.2 (L) 13.6 - 17.2 g/dL    HCT 36.3 (L) 41.1 - 50.3 %    MCV 94.5 79.6 - 97.8 FL    MCH 31.8 26.1 - 32.9 PG    MCHC 33.6 31.4 - 35.0 g/dL    RDW 13.2 11.9 - 14.6 %    PLATELET 777 (L) 867 - 450 K/uL    MPV 10.1 9.4 - 12.3 FL    ABSOLUTE NRBC 0.00 0.0 - 0.2 K/uL     Lab Results   Component Value Date/Time    Cholesterol, total 154 12/04/2019 03:41 AM    HDL Cholesterol 64 (H) 12/04/2019 03:41 AM    LDL, calculated 81.6 12/04/2019 03:41 AM    VLDL, calculated 8.4 12/04/2019 03:41 AM    Triglyceride 42 12/04/2019 03:41 AM    CHOL/HDL Ratio 2.4 12/04/2019 03:41 AM       Results for orders placed or performed during the hospital encounter of 12/03/19   CT CODE NEURO HEAD [address]    Narrative    HEAD CT WITHOUT CONTRAST  12/3/2019     HISTORY:   Code stroke with left-sided numbness confusion and altered mental  status. Aphasia    TECHNIQUE: Noncontrast axial images were obtained through the brain. All CT  scans at this facility used dose modulation, interactive reconstruction and/or  weight based dosing when appropriate to reduce radiation dose to as low as  reasonably achievable. COMPARISON: July 30, 2015    FINDINGS: There is no acute intracranial hemorrhage, significant mass effect or  CT evidence of acute large artery territorial infarction. Please note that a  hyperacute infarct or small vessel infarct may not be apparent on initial CT  imaging. Scattered areas of low-attenuation are present in the supratentorial white  matter, suggestive of chronic small vessel ischemic disease. Atherosclerotic  calcifications within the middle cerebral arteries have progressed compared to  the previous CT in 2015. Mild/moderate cerebral volume loss is present. There is no hydrocephalus , intra-axial mass or abnormal extra-axial fluid  collection. There are no displaced skull fractures.  The mastoid air cells and  paranasal sinuses are clear where imaged. Impression    IMPRESSION:     1. Cerebral volume loss and white matter findings compatible with chronic small  vessel ischemic disease. 2. No acute intracranial hemorrhage. MRI BRAIN WO CONT    Narrative    MRI brain without contrast: 12/04/2019    History: Hypertension, paresthesias, speech disturbance. Symptoms began  yesterday. Imaging sequences: Sagittal short TR/short TE, axial short TR/short TE, long  TR/long TE, FLAIR, gradient recall, diffusion weighted images and ADC mapping. Coronal FLAIR. Imaging was performed on a 1.5 Ruby magnet. Comparison: None. Correlation is made to the CT scan of the brain 12/03/2019. Findings: The ventricles and sulci are felt to be appropriate for age. There  are no extra-axial fluid collections. Normal flow voids are present within all  of the major intracranial vessels although there is tortuosity of the  vertebrobasilar system causing moderate flattening along the right ventral  aspect of the medulla. There are several foci of signal hypointensity on the  gradient recall sequence many of which reside within the basal ganglia most  compatible with hemosiderin staining from remote microbleed. The largest of  these resides within the right thalamus measuring 8 mm with associated gliosis. There is a punctate focus of restricted diffusion within the right putamen  concerning for an acute infarction. Patchy and discrete of T2 prolongation are  present within the supratentorial white matter. These are nonspecific findings  but would be most compatible with mild chronic small vessel ischemic changes. There is minor mucosal thickening within the maxillary sinuses. Impression    Impression:  1. Punctate focus of acute infarction within the right putamen. 2. Mild chronic small vessel ischemic changes. 3. Multifocal areas of hemosiderin staining.  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 DUPLEX CAROTID BILATERAL    Narrative    Examination: Carotid ultrasound. Comparison: None. Indication: TIA, neurological disturbance    Technique: Color and power Doppler imaging as well as spectral analysis of the  bilateral extracranial carotid systems were performed. Findings: There is mild atheromatous plaquing at the carotid bulbs. There is no  hemodynamically significant stenosis. The peak systolic velocities are within  normal. The peak systolic velocity in the right ICA is 67 cm/s with ICA/CCA  ratio of 0.8. Peak systolic velocity in the left ICA is 67 cm/s with ICA/CCA  ratio of 0.8. The vertebral arteries have antegrade flow. Impression    IMPRESSION:    1. No hemodynamically significant stenosis           CBC WITH AUTOMATED DIFF   Result Value Ref Range    WBC 5.9 4.3 - 11.1 K/uL    RBC 4.50 4.23 - 5.6 M/uL    HGB 14.2 13.6 - 17.2 g/dL    HCT 42.9 41.1 - 50.3 %    MCV 95.3 79.6 - 97.8 FL    MCH 31.6 26.1 - 32.9 PG    MCHC 33.1 31.4 - 35.0 g/dL    RDW 13.2 11.9 - 14.6 %    PLATELET 414 (L) 976 - 450 K/uL    MPV 10.2 9.4 - 12.3 FL    ABSOLUTE NRBC 0.00 0.0 - 0.2 K/uL    DF AUTOMATED      NEUTROPHILS 60 43 - 78 %    LYMPHOCYTES 27 13 - 44 %    MONOCYTES 10 4.0 - 12.0 %    EOSINOPHILS 2 0.5 - 7.8 %    BASOPHILS 1 0.0 - 2.0 %    IMMATURE GRANULOCYTES 0 0.0 - 5.0 %    ABS. NEUTROPHILS 3.6 1.7 - 8.2 K/UL    ABS. LYMPHOCYTES 1.6 0.5 - 4.6 K/UL    ABS. MONOCYTES 0.6 0.1 - 1.3 K/UL    ABS. EOSINOPHILS 0.1 0.0 - 0.8 K/UL    ABS. BASOPHILS 0.0 0.0 - 0.2 K/UL    ABS. IMM.  GRANS. 0.0 0.0 - 0.5 K/UL   PROTHROMBIN TIME + INR   Result Value Ref Range    Prothrombin time 13.1 11.7 - 14.5 sec    INR 1.0     METABOLIC PANEL, COMPREHENSIVE   Result Value Ref Range    Sodium 145 136 - 145 mmol/L    Potassium 4.0 3.5 - 5.1 mmol/L    Chloride 108 (H) 98 - 107 mmol/L    CO2 32 21 - 32 mmol/L    Anion gap 5 (L) 7 - 16 mmol/L    Glucose 101 (H) 65 - 100 mg/dL    BUN 27 (H) 8 - 23 MG/DL    Creatinine 1.34 0.8 - 1.5 MG/DL    GFR est AA >60 >60 ml/min/1.73m2    GFR est non-AA 54 (L) >60 ml/min/1.73m2    Calcium 9.0 8.3 - 10.4 MG/DL    Bilirubin, total 0.8 0.2 - 1.1 MG/DL    ALT (SGPT) 18 12 - 65 U/L    AST (SGOT) 18 15 - 37 U/L    Alk.  phosphatase 58 50 - 136 U/L    Protein, total 7.4 6.3 - 8.2 g/dL    Albumin 3.9 3.2 - 4.6 g/dL    Globulin 3.5 2.3 - 3.5 g/dL    A-G Ratio 1.1 (L) 1.2 - 3.5     PTT   Result Value Ref Range    aPTT 28.0 24.7 - 39.8 SEC   URINALYSIS W/ RFLX MICROSCOPIC   Result Value Ref Range    Color YELLOW      Appearance CLEAR      Specific gravity 1.008 1.001 - 1.023      pH (UA) 7.5 5.0 - 9.0      Protein NEGATIVE  NEG mg/dL    Glucose NEGATIVE  mg/dL    Ketone NEGATIVE  NEG mg/dL    Bilirubin NEGATIVE  NEG      Blood TRACE (A) NEG      Urobilinogen 0.2 0.2 - 1.0 EU/dL    Nitrites NEGATIVE  NEG      Leukocyte Esterase NEGATIVE  NEG      WBC 0 0 /hpf    RBC 0-3 0 /hpf    Epithelial cells 0 0 /hpf    Bacteria 0 0 /hpf    Casts 0 0 /lpf   LIPID PANEL   Result Value Ref Range    LIPID PROFILE          Cholesterol, total 154 <200 MG/DL    Triglyceride 42 35 - 150 MG/DL    HDL Cholesterol 64 (H) 40 - 60 MG/DL    LDL, calculated 81.6 <100 MG/DL    VLDL, calculated 8.4 6.0 - 23.0 MG/DL    CHOL/HDL Ratio 2.4     HEMOGLOBIN A1C WITH EAG   Result Value Ref Range    Hemoglobin A1c 5.6 4.8 - 6.0 %    Est. average glucose 114 mg/dL   CBC W/O DIFF   Result Value Ref Range    WBC 6.8 4.3 - 11.1 K/uL    RBC 3.84 (L) 4.23 - 5.6 M/uL    HGB 12.2 (L) 13.6 - 17.2 g/dL    HCT 36.3 (L) 41.1 - 50.3 %    MCV 94.5 79.6 - 97.8 FL    MCH 31.8 26.1 - 32.9 PG    MCHC 33.6 31.4 - 35.0 g/dL    RDW 13.2 11.9 - 14.6 %    PLATELET 825 (L) 451 - 450 K/uL    MPV 10.1 9.4 - 12.3 FL    ABSOLUTE NRBC 0.00 0.0 - 0.2 K/uL   CRITICAL CARE (ASAP ONLY)    Narrative    Charmayne Duel, MD     12/3/2019  9:29 PM  CRITICAL CARE (ASAP ONLY)  Performed by: Charmayne Duel, MD  Authorized by: Charmayne Duel, MD Critical care provider statement:     Critical care time (minutes):  38    Critical care start time:  12/3/2019 8:51 PM    Critical care end time:  12/3/2019 9:29 PM    Critical care was necessary to treat or prevent imminent or   life-threatening deterioration of the following conditions:  CNS failure   or compromise    Critical care was time spent personally by me on the following   activities:  Blood draw for specimens, development of treatment plan with   patient or surrogate, discussions with consultants, ordering and   performing treatments and interventions, ordering and review of laboratory   studies, ordering and review of radiographic studies, pulse oximetry,   re-evaluation of patient's condition, examination of patient and obtaining   history from patient or surrogate    I assumed direction of critical care for this patient from another   provider in my specialty: no     POC PT/INR   Result Value Ref Range    Prothrombin time (POC) 17.6 (H) 9.6 - 11.6 SECS    INR (POC) 1.5 (H) 0.9 - 1.2     GLUCOSE, POC   Result Value Ref Range    Glucose (POC) 103 (H) 65 - 100 mg/dL   EKG, 12 LEAD, INITIAL   Result Value Ref Range    Ventricular Rate 69 BPM    Atrial Rate 69 BPM    P-R Interval 158 ms    QRS Duration 102 ms    Q-T Interval 380 ms    QTC Calculation (Bezet) 407 ms    Calculated P Axis 61 degrees    Calculated R Axis 10 degrees    Calculated T Axis -10 degrees    Diagnosis       !! AGE AND GENDER SPECIFIC ECG ANALYSIS !!   Normal sinus rhythm  Normal ECG  When compared with ECG of 25-MAY-2015 16:20,  Premature ventricular complexes are no longer Present  Confirmed by Luca Atkins ([id number]) on 2019 7:19:09 AM     2D ECHO COMPLETE ADULT (TTE) W OR WO CONTR    Narrative    [place]  [facility] [address] [facility], [address] [facility]  [phone]    Transthoracic Echocardiogram  2D, M-mode, Doppler, and Color Doppler    Patient: [facility]  MR #: [id number]  : 1935  Age: 81 years  Gender: Male  Study date: 04-Dec-2019  Account #: [de-identified]  Height: 69 in  Weight: 165.7 lb  BSA: 1.91 mï¾²  Status:Routine  Location: [facility]  BP: 124/ 64    Allergies: AMIODARONE, BENAZEPRIL, HYDROCHLOROTHIAZIDE, LISINOPRIL    Sonographer:  Deejay Alfonso RDCS  Group:  [facility]  Referring Physician:  Alissa Florentino MD  Reading Physician:  Tere Mercado MD [facility]    INDICATIONS: TIA workup. PROCEDURE: This was a routine study. A transthoracic echocardiogram was  performed. The study included complete 2D imaging, M-mode, complete spectral  Doppler, and color Doppler. Intravenous contrast (agitated saline) was  administered. Image quality was adequate. LEFT VENTRICLE: Size was normal. Systolic function was normal. Ejection  fraction was estimated in the range of 55 % to 60 %. There were no regional  wall motion abnormalities. Wall thickness was mildly increased. Avg. E/e'=  9.25. Left ventricular diastolic function parameters were normal.    RIGHT VENTRICLE: The size was normal. Systolic function was normal. Estimated  peak pressure was in the range of 30-35 mmHg. LEFT ATRIUM: The atrium was mildly dilated. ATRIAL SEPTUM: Contrast injection was performed. There was no right-to-left  shunt, with provocative maneuvers to increase right atrial pressure. RIGHT ATRIUM: Size was normal.    SYSTEMIC VEINS: IVC: The inferior vena cava was dilated. Respirophasic   changes  in dimension were absent. AORTIC VALVE: The valve was trileaflet. Leaflets exhibited mild sclerosis. There was no evidence for stenosis. There was mild regurgitation. MITRAL VALVE: Valve structure was normal. There was no evidence for stenosis. There was trivial regurgitation. TRICUSPID VALVE: The valve structure was normal. There was no evidence for  stenosis. There was mild regurgitation. PULMONIC VALVE: The valve structure was normal. There was no evidence for  stenosis.  There was trivial regurgitation. PERICARDIUM: There was no pericardial effusion. AORTA: The root exhibited normal size. SUMMARY:    -  Left ventricle: Systolic function was normal. Ejection fraction was  estimated in the range of 55 % to 60 %. There were no regional wall motion  abnormalities. Wall thickness was mildly increased. -  Left atrium: The atrium was mildly dilated. -  Atrial septum: Contrast injection was performed. There was no   right-to-left  shunt, with provocative maneuvers to increase right atrial pressure. -  Inferior vena cava, hepatic veins: The inferior vena cava was dilated. Respirophasic changes in dimension were absent.    -  Tricuspid valve: There was mild regurgitation. SYSTEM MEASUREMENT TABLES    2D mode  AoR Diam (2D): 3.9 cm  Left Atrium Systolic Volume Index; Method of Disks, Biplane; 2D mode;: 37.2  ml/m2  IVS/LVPW (2D): 0.9  IVSd (2D): 1.1 cm  LVIDd (2D): 4.4 cm  LVIDs (2D): 2.6 cm  LVOT Area (2D): 3.8 cm2  LVPWd (2D): 1.2 cm  RVIDd (2D): 3.1 cm    Tissue Doppler Imaging  LV Peak Early Mendoza Tissue Preston; Lateral MA (TDI): 8 cm/s  LV Peak Early Mendoza Tissue Preston; Medial MA (TDI): 4.9 cm/s    Unspecified Scan Mode  Peak Grad; Mean; Antegrade Flow: 10 mm[Hg]  Vmax; Antegrade Flow: 158 cm/s  LVOT Diam: 2.2 cm  MV Peak Preston/LV Peak Tissue Preston E-Wave; Lateral MA: 7  MV Peak Preston/LV Peak Tissue Preston E-Wave; Medial MA: 11.5    Prepared and signed by    Camille Yee MD [facility]  Signed 04-Dec-2019 14:41:48          ASSESSMENT and PLAN    Principal Problem:    CVA (cerebral vascular accident) ([facility] [address] (12/4/2019)- acute CVA- permissive HTN per primary MD's recent, PRN hydralazine for severe HTN during hospitalization. Statin/antiplatelet therapy per neuro recs. Tele negative for pAF thus far.       Active Problems       PVCs (premature ventricular contractions) - no symptoms of palpitations since admit- improved clinically since prior office evaluation and therapy. ..       Overview: 12K/24hrs. Normal LVEF and no ischemia on stress testing. Started       Amiodarone but patient stopped medication. PVCs resolved and repeat       monitor with essentially noPVCs.              Hypertensive emergency (12/3/2019)- ? Driven by intermittent PRN clonidine use and intermittent anxiety? He denies missing any regularly scheduled meds or excessive sodium intake. ? Increase ARB to BID dosing or higher QAM dose at discharge. Avoid hypotension in acute ischemic CVA period. Follow rhythm on tele. Echo and tele benign thus far. Home with 30 day tele monitor to exclude occult pAFib. Avoid beta blocker with HR in 50-60's at present. Titrate ARB and/or slowly add norvasc for better BP control in outpatient setting as needed.  We will follow with you.        Terrie Sommers MD  12/04/19  7:18 PM

## 2019-12-05 NOTE — PROGRESS NOTES
BP rechecked. Pt active and anxious. Refuses to sit and relax. /92 and 192/92. Hydralazine is every 6 hours PRN and already given. Labetalol PRN ordered for BP > 220/120 and does not meet this criteria. Will continue to monitor pt.

## 2019-12-05 NOTE — PROGRESS NOTES
Patient has discharged orders. Patient refused AM medication. Patient states he will take medication when he gets home.

## 2019-12-05 NOTE — PROGRESS NOTES
[facility] CARDIOLOGY PROGRESS NOTE      12/5/2019 9:35 AM    Admit Date: 12/3/2019    Admit Diagnosis: Hypertensive emergency [I16.1];TIA (transient ischemic attack) [G45.9]; Hypertensive emergency [I16.1];TIA (transient ischemic attack) [G45.9]      Subjective:   No chest pain or dyspnea. Objective:      Vitals:    12/05/19 0000 12/05/19 0026 12/05/19 0451 12/05/19 0721   BP: (!) 198/92 (!) 192/92 164/83 154/80   Pulse: 68  62 61   Resp:   18 18   Temp:   97.7 °F (36.5 °C) 98.1 °F (36.7 °C)   SpO2:   96% 97%   Weight:       Height:           Physical Exam:  Neck-   CV- rr+r  Lungs- clear  Ext-  Skin- warm and dry        Data Review:   Recent Labs     12/04/19  0341 12/03/19 2001 12/03/19 1954   NA  --  145  --    K  --  4.0  --    BUN  --  27*  --    CREA  --  1.34  --    GLU  --  101*  --    WBC 6.8 5.9  --    HGB 12.2* 14.2  --    HCT 36.3* 42.9  --    * 148*  --    INR  --  1.0 1.5*   CHOL 154  --   --    TRIGL 42  --   --    HDL 64*  --   --        Assessment and Plan:     Principal Problem:    CVA (cerebral vascular accident) ([facility] (12/4/2019)   looking towards d/c with medicine . He will need 30 day tele monitor set up with [address] [facility]    we will arrange for d/c instructions                            he should titrate up Valsartan to 80 bid . blood pressure reasonable range currently      Active Problems:    Hypertension ()      PVCs (premature ventricular contractions) (5/9/2016)      Overview: 12K/24hrs. Normal LVEF and no ischemia on stress testing. Started       Amiodarone but patient stopped medication. PVCs resolved and repeat       monitor with essentially noPVCs.               SSS (sick sinus syndrome) ([facility] (5/9/2016)      Hypertensive emergency (12/3/2019)        Rossy Varela MD

## 2019-12-05 NOTE — PROGRESS NOTES
Received report from [facility]. Patient awake resting in bed. Respirations present. On room air. No signs of distress. Daughter and wife present at bedside. Bed low and locked. Call light within reach. Will continue to monitor.

## 2019-12-06 ENCOUNTER — PATIENT OUTREACH (OUTPATIENT)
Dept: CASE MANAGEMENT | Age: 84
End: 2019-12-06

## 2019-12-06 NOTE — PROGRESS NOTES
This note will not be viewable in [address] Transition of Care Discharge Follow-up Questionnaire   Date/Time of Call:   12/06/2019 12:45 pm    What was the patient hospitalized for? CVA ( Cerebral vascular accident)    Does the patient understand his/her diagnosis and/or treatment and what happened during the hospitalization? Yes, spoke with patient and he states he has an understanding of his recent hospitalization, diagnosis and treatment. He states he is doing well this morning. Did the patient receive discharge instructions? Yes    CM Assessed Risk for Readmission:       Patient stated Risk for Readmission:      Low risk for readmission. Patient did not voice any concerns regarding readmission. Review any discharge instructions (see discharge instructions/AVS in [facility]). Ask patient if they understand these. Do they have any questions? Discharge instructions reviewed with patient and he verbalized understanding. Were home services ordered (nursing, PT, OT, ST, etc.)? No   If so, has the first visit occurred? If not, why? (Assist with coordination of services if necessary.)   N/A   Was any DME ordered? No    If so, has it been received? If not, why?  (Assist patient in obtaining DME orders &/or equipment if necessary.) N/A    START taking:  aspirin 81 mg chewable tablet 1 QD   atorvastatin 80 mg tablet (LIPITOR) 1 QD  STOP taking:  cloNIDine HCl 0.1 mg tablet (CATAPRES)  RESUME All PREVIOUS MEDICATIONS:    Were all new prescriptions filled? If not, why?  (Assist patient in obtaining medications if necessary  escalate for CCM &/or SW if ongoing issues are verbalized by pt or anticipated)   Yes    Does the patient understand the purpose and dosing instructions for all medications? (If patient has questions, provide explanation and education.)   Medications reviewed with patient and he verbalizes understanding of purpose and dosing of medications.       Does the patient have any problems in performing ADLs? (If patient is unable to perform ADLs  what is the limiting factor(s)? Do they have a support system that can assist? If no support system is present, discuss possible assistance that they may be able to obtain. Escalate for CCM/SW if ongoing issues are verbalized by pt or anticipated)   Patient independent with ADLs. Wife available for assistance as needed. Does the patient have all follow-up appointments scheduled? 7 day f/up with PCP?   (f/up with PCP may be w/in 14 days if patient has a f/up with their specialist w/in 7 days)    7-14 day f/up with specialist?   (or per discharge instructions)    If f/up has not been made  what actions has the care coordinator made to accomplish this? Has transportation been arranged? FU with Blair Jay NP 12/13/2019    FU with Dr. Brittany Aranda 1/16/2020            . Discussed the importance of FU appointments with patient and he verbalized understanding. No transportation needs at this time. Transportation will be provided by wife. Any other questions or concerns expressed by the patient? No voiced questions or concerns. Schedule next appointment with SONI CARPIO Coordinator or refer to RN Case Manager/ per the workflow guidelines. When is care coordinators next follow-up call scheduled? If referred for CCM  what RN care manager      was the referral assigned? Contact information for Care Coordinator was given and instructed wife to call with questions or concerns. Alfred Newell        7-14 days    BRAIN Call Completed By: Deidre Malloy LPN Care Coordinator

## 2019-12-17 ENCOUNTER — PATIENT OUTREACH (OUTPATIENT)
Dept: CASE MANAGEMENT | Age: 84
End: 2019-12-17

## 2019-12-17 NOTE — PROGRESS NOTES
This note will not be viewable in [address] Transitions of Care  Follow up Outreach Note   Outreach type Phone call: Spoke with patient   Home visit:   Date/Time of Outreach: 12/17/2019   3:05 pm      Has patient attended PCP or specialist follow-up appointments since last contact? What was outcome of appointment? When is next follow-up scheduled? Patient has completed scheduled FU appointments appropriately. Patient has completed FU with PCP Dr. Tiffanie Villalpando. FU with Cardiologist on 1/16/2020. Patient currently wearing event monitor. Review medications. Any medication changes since last outreach? Does patient have any questions or issues related to their medications? Medications reviewed with patient. PCP increased Diovan to 80mg BID. Patient understands changes in medication. Home health active? If yes  any issue? Progress? No     Referrals needed?  (CM, SW, HH, etc. )   No   Other issues/Miscellaneous? (Transportation, access to meals, ability to perform ADLs, adequate caregiver support, etc.) No transportation needs. Next Outreach Scheduled? Graduation from program?   15-30 days       Next Steps/Goals (if applicable):            Outreach completed by:   Mady Heard LPN Care Coordinator

## 2020-01-02 ENCOUNTER — PATIENT OUTREACH (OUTPATIENT)
Dept: CASE MANAGEMENT | Age: 85
End: 2020-01-02

## 2020-01-02 NOTE — PROGRESS NOTES
This note will not be viewable in [address] Transitions of Care  Follow up Outreach Note   Outreach type Phone call: Spoke with patient  Home visit:   Date/Time of Outreach: 1/2/2020 10:55 am      Has patient attended PCP or specialist follow-up appointments since last contact? What was outcome of appointment? When is next follow-up scheduled? FU appointment scheduled with Dr. Darrin Tee 1/8/2019 and FU scheduled with Dr. Isabel Garcia for 1/16/2019. Patient states he will be able to return the event monitor in a few days. Review medications. Any medication changes since last outreach? Does patient have any questions or issues related to their medications? Medications reviewed with patient with no changes. Patient states he has been monitoring his B/P since change in B/P medications. Encouraged patient to take B/P log to scheduled appointments. A patient state that recently has noticed  a decrease in his energy level. Encouraged patient to discuss this with MD at FU appointment. Patient verbalized understanding of advice given. Home health active? If yes  any issue? Progress? No     Referrals needed?  (CM, SW, HH, etc. )   No    Other issues/Miscellaneous? (Transportation, access to meals, ability to perform ADLs, adequate caregiver support, etc.) No issues with transportation. Next Outreach Scheduled? Graduation from program?   Yes      Next Steps/Goals (if applicable):         Outreach completed by:   Ramona Dakin LPN Care Coordinator

## 2020-01-16 PROBLEM — R07.2 PRECORDIAL PAIN: Chronic | Status: ACTIVE | Noted: 2020-01-16

## 2020-02-27 ENCOUNTER — HOSPITAL ENCOUNTER (OUTPATIENT)
Dept: CT IMAGING | Age: 85
Discharge: HOME | End: 2020-02-27
Attending: INTERNAL MEDICINE
Payer: MEDICARE

## 2020-02-27 DIAGNOSIS — I63.9 CEREBROVASCULAR ACCIDENT (CVA), UNSPECIFIED MECHANISM (HCC): ICD-10-CM

## 2020-02-27 PROCEDURE — 70450 CT HEAD/BRAIN W/O DYE: CPT

## 2020-03-16 ENCOUNTER — HOSPITAL ENCOUNTER (OUTPATIENT)
Dept: PHYSICAL THERAPY | Age: 85
Discharge: HOME | End: 2020-03-16
Attending: INTERNAL MEDICINE
Payer: MEDICARE

## 2020-03-16 DIAGNOSIS — I63.9 CEREBROVASCULAR ACCIDENT (CVA), UNSPECIFIED MECHANISM (HCC): ICD-10-CM

## 2020-03-16 PROCEDURE — 96125 COGNITIVE TEST BY HC PRO: CPT | Performed by: SPEECH-LANGUAGE PATHOLOGIST

## 2020-03-16 NOTE — THERAPY EVALUATION
Lisandro House  : 1935  [place]  Secondary: [address] at [address], [address], [place] [address].  [phone]   [phone]         OUTPATIENT SPEECH LANGUAGE PATHOLOGY: Initial Assessment    ICD-10: Treatment Diagnosis: Cognitive Communication deficit R41.841   REFERRING PHYSICIAN: Veena Bahena MD MD Orders: Evaluate and Treat  Return Physician Appointment: Unknown  PAST MEDICAL HISTORY:   Mr. Julee Vidales is a 81 y.o. male who  has a past medical history of Abdominal pain (2016), Acute prostatitis, Backache, unspecified, Calculus of kidney, Cancer ([facility] [address] Diabetes ([address] Diverticulosis (14), Edema (2016), Elevated prostate specific antigen (PSA), Essential hypertension, benign, GERD (gastroesophageal reflux disease), H. pylori infection (14), History of barium enema (3/18/14), History of BPH, Hypertension, Hypertrophy of prostate without urinary obstruction and other lower urinary tract symptoms (LUTS), Impotence of organic origin, Lumbago, Malignant neoplasm of prostate ([address] Microscopic hematuria, Multiple renal cysts (14), Osteopenia determined by x-ray, Other ill-defined conditions(799.89), PVCs (premature ventricular contractions) (2016), and SSS (sick sinus syndrome) ([address] (2016). He also  has a past surgical history that includes hx other surgical; hx hernia repair; hx radical prostatectomy; hx urological; hx tonsil and adenoidectomy; hx cholecystectomy; hx colonoscopy (); and hx endoscopy (2017). MEDICAL/REFERRING DIAGNOSIS: Cerebrovascular accident (CVA), unspecified mechanism ([address] [I63.9]  DATE OF ONSET: 2019  PRIOR LEVEL OF FUNCTION: Independent ADL's  PRECAUTIONS/ALLERGIES: Seasonal allergies   ASSESSMENT:  Patient is an 81year old male who was referred for speech evaluation due to CVA. He suffered a CVA on 2019.  He was admitted to [facility] and only stayed just two days and was discharged home. Patient reports difficulty with memory recall. He does have some difficulty with names as well. He reports that he doesn't have issues with long term recall just short term. He reports that he was having some difficulty with his memory prior to his CVA. Wife reports that he's having some difficulty with reading, writing, and balancing checkbook. Patient used to read a lot and now he's not reading as much. He also endorses some difficulty with adapting to change especially if things change with how he checks his finances online etc.  He was independent with ADL's prior to his CVA. Based on the objective data described below, the patient presents with moderate cognitive deficits. Patient was assessed using the [employer]. He scored WNL's in the area of temporal orientation remote memory. He scored mild-moderate in the areas of recent memory(75%tile) and organization(75%tile). Moderate deficits in the areas of: immediate memory(50%tile), temporal orientation recent memory(50%tile), problem solving/reasoning(50%tile) and auditory processing and retention(50%tile). He does present with decreased legibility due to his essential tremor. He was also given the [facility] Cognitive Assessment/Screen with noted difficulty with visualspatial/executive functioning and attention. Overall score was 21/30 with norm 26 or greater. He would benefit from skilled treatment to address his memory, organization, attention and executive functioning skills at 2x's a week for 12 weeks. After 12 weeks, he will be re-assessed for improvement. Prognosis for achieving goals are good based on patient's motivation and family support. Patient will benefit from skilled intervention to address the above impairments. ?????? ? ? This section established at most recent assessment??????????  PROBLEM LIST (Impairments causing functional limitations):  1.  Cognitive decline  GOALS: (Goals have been discussed and agreed upon with patient.)  SHORT-TERM FUNCTIONAL GOALS: Time Frame: 12  1. The patient will complete a  (sustained, selective, alternating , divided) attention task with  no more than x number of redirections when given min  (verbal, visual, tactile) cues with 80%  accuracy. 2. The patient will demonstrate adequate attention to therapy tasks with no more than   two redirections in a 45-60 min session when given min cues with 80% accuracy. 3. The patient will demonstrate the ability to follow multi-step directions related to functional living  environment with 80%verbal, visual and tactile cues in order to increase functional integration  into environment  4. The patient will demonstrate recall of functional information following a/an (immediate, shortterm,  long-term) delay with 75% accuracy with mod cues in order to increase functional integration into environment. 5. The patient will complete a mental manipulation task with 80% accuracy and mincueing in order  to increase functional integration into environment. 6. The patient will complete executive functioning skills such as money and mediation management at Mod I 90% accuracy. DISCHARGE GOALS: Time Frame: 3 months  Client will develop functional, cognitive-linguistic-based skills and utilize compensatory  strategies to communicate wants and needs effectively, maintain safety during ADLs and  participate socially in functional living environment  REHABILITATION POTENTIAL FOR STATED GOALS: [address]:  INTERVENTIONS PLANNED: (Benefits and precautions of therapy have been discussed with the patient.)  1. Speech therapy  TREATMENT PLAN EFFECTIVE DATES: 3/16/2020 TO 6/14/2020 (90 days). FREQUENCY/DURATION: Continue to follow patient 2 times a week for 90 days to address above goals.   Regarding Mel Corrales's therapy, I certify that the treatment plan above will be carried out by a therapist or under their direction. Thank you for this referral,  DELVIN Barrios Ed CCC-SLP                  Referring Physician Signature: Deepa Alba MD     Date      SUBJECTIVE:  Alert  Present Symptoms: Cognitive deficits     Current Medications:   Current Outpatient Medications on File Prior to Encounter   Medication Sig Dispense Refill    famotidine (PEPCID) 20 mg tablet Take 1 Tab by mouth two (2) times a day. 60 Tab 5    OTHER Indications: Tynelol 500 mg      varicella-zoster recombinant, PF, (SHINGRIX, PF,) 50 mcg/0.5 mL susr injection 0.5mL by IntraMUSCular route once now and then repeat in 2-6 months 0.5 mL 1    dilTIAZem CD (CARDIZEM CD) 120 mg ER capsule Take 1 Cap by mouth daily. 30 Cap 5    magnesium oxide (MAG-OX) 400 mg tablet TAKE 1 TAB BY MOUTH TWO (2) TIMES A DAY. 120 Tab 3    cloNIDine HCl (CATAPRES) 0.1 mg tablet Take 1 Tab by mouth daily as needed (take for BP greater than 160/90). 30 Tab 0    meclizine (ANTIVERT) 25 mg tablet Take 1 Tab by mouth three (3) times daily as needed for Dizziness. 90 Tab 2    valsartan (DIOVAN) 80 mg tablet Take 1 Tab by mouth two (2) times a day. 60 Tab 3    aspirin 81 mg chewable tablet Take 1 Tab by mouth daily. 30 Tab 0    atorvastatin (LIPITOR) 80 mg tablet Take 1 Tab by mouth nightly. 90 Tab 3    fish oil-dha-epa 1,200-144-216 mg cap Take  by mouth.  docusate sodium (STOOL SOFTENER) 100 mg tab Take  by mouth.  OTHER Organic Smooth Move for constipation      fluticasone propionate (FLONASE) 50 mcg/actuation nasal spray 2 Sprays by Both Nostrils route daily. 1 Bottle 5    triamcinolone acetonide (KENALOG) 0.025 % topical cream Apply  to affected area two (2) times a day. use thin layer      betamethasone dipropionate (DIPROLENE) 0.05 % ointment Apply  to affected area two (2) times a day.  fexofenadine (ALLEGRA) 180 mg tablet Take 180 mg by mouth daily as needed for Allergies.       hydrocortisone (HYTONE) 2.5 % topical cream Apply  to affected area two (2) times a day. use thin layer 30 g 0    cholecalciferol, vitamin D3, (VITAMIN D3) 2,000 unit tab Take  by mouth.  TURMERIC PO Take  by mouth.  folic acid-vit W9-GLS R05 2.2-25-1 mg tab Take  by mouth.  nystatin-triamcinolone (MYCOLOG II) topical cream Apply  to affected area two (2) times daily as needed for Skin Irritation. 30 g 1     No current facility-administered medications on file prior to encounter. Date Last Reviewed: 3/16/20  Social History/Home Situation:       Work/Activity History: Retired     OBJECTIVE:  Objective Measure: Tool Used: National Outcomes Measurement System: Functional Communication Measures: ATTENTION  Score:  Initial: 4 Most Recent: X (Date: -- )   Interpretation of Tool:  This measure describes the change in functional communication status subsequent to speech-language pathology treatment of patients who have attention deficits. o Level 1:  Attention is nonfunctional. The individual is generally unresponsive to most stimuli.  o Level 2: The individual can briefly attend with consistent maximal stimulation, but not long enough to complete even simple living tasks. o Level 3: The individual maintains attention over time to complete simple living tasks of short duration with consistent maximal cueing in the absence of distracting stimuli.  o Level 4: The individual maintains attention during simple living tasks of multiple steps and long duration within a minimally distracting environment with consistent minimal cueing. o Level 5: The individual maintains attention within simple living activities with occasional minimal cues within distracting environments. The individual requires increased cueing to start, continue, and change attention during complex activities. o Level 6: The individual maintains attention within complex activities, and can attend simultaneously to multiple demands with rare minimal cues.  The individual usually uses compensatory strategies when encountering difficulty. The individual has mild difficulty or takes more than a reasonable amount of time to attend to multiple tasks/stimuli  o Level 7: The individuals ability to participate in vocational, a vocational or social activities is not limited by attentional abilities. Independent functioning may occasionally include the use of compensatory strategies. Score Level 7 Level 6 Level 5 Level 4 Level 3 Level 2 Level 1   Modifier CH CI CJ CK CL CM CN       Tool Used: National Outcomes Measurement System: Functional Communication Measures: MEMORY  Score:  Initial: 4 Most Recent: X (Date: -- )   Interpretation of Tool: This measure describes the change in functional communication status subsequent to speech-language pathology treatment of patients who have memory deficits. o Level 1:  The individual is unable to recall any information, regardless of cueing. o Level 2: The individual consistently requires maximal verbal cues or uses external aids to recall personal information (e.g., family members, biographical information, physical location, etc.) in structured environments. o Level 3: The individual usually requires maximum cues to recall or use external aids for simple routine and personal information (e.g., schedule, names of familiar staff, location of therapy areas, etc.) in structured environments. o Level 4: The individual occasionally requires minimal cues to recall or use external memory aids for simple routine and personal information in structured environments. The individual requires consistent maximal cues to recall or use memory aids for complex and novel information (e.g., carry out multiple steps activities, accommodate schedule changes, anticipate meal times, etc.), plan and follow through on simple future events (e.g., use calendar to keep appointments, use log books to complete a single assignment/task, etc.) in structured environments.   o Level 5 The individual consistently requires minimal cues to recall or use external memory aids for complex and novel information. The individual consistently requires minimal cues to plan and follow through on complex future events (e.g., menu planning and meal preparation, planning a party, etc.).  o Level 6: The individual is able to recall or use external aids/memory strategies for complex information and planning complex future events most of the time. When there is a breakdown in the use of recall/memory strategies/external memory aids, the individual occasionally requires minimal cues. These breakdowns may occasionally interfere with the individuals functioning in vocational, avocational, and social activities. o Level 7: The individual is successful and independent in recalling or using external aids/memory strategies for complex information and planning future events in all vocational, avocational, and social activities.   Score Level 7 Level 6 Level 5 Level 4 Level 3 Level 2 Level 1   Modifier CH CI CJ CK CL CM CN     Oral Motor Structure/Speech:  Oral-Motor Structure/Motor Speech  Face: No impairment  Labial: No impairment  Dentition: Natural  Oral Hygiene: good  Lingual: No impairment  Velum: No impairment  Mandible: No impairment  Apraxic Characteristics: None  Dysarthric Characteristics: None  Intelligibility: No impairment  Intonation: WNL  Rate: WNL  Prosody: WNL  Overall Impairment Severity: None    SPEECH-LANGUAGE COGNITIVE EVALUATION  Tests Given: Ariadne Hager Information Processing Assessment    Mental Status:  Neurologic State: Alert  Orientation Level: Oriented X4  Cognition: Follows commands  Perception: Appears intact  Perseveration: No perseveration noted  Safety/Judgement: Awareness of environment    Motor Speech:  Oral-Motor Structure/Motor Speech  Face: No impairment  Labial: No impairment  Dentition: Natural  Oral Hygiene: good  Lingual: No impairment  Velum: No impairment  Mandible: No impairment  Apraxic Characteristics: None  Dysarthric Characteristics: None  Intelligibility: No impairment  Intonation: WNL  Rate: WNL  Prosody: WNL  Overall Impairment Severity: None    Auditory Comprehension:   Auditory Comprehension  Auditory Impairment: No     Reading Comprehension:   Reading Comprehension  Visual Impairment: No impairment;Glasses/contacts  Pre-Morbid Reading Status: Literate  Scanning/Tracking : No impairment  Sentence: No Impairment  Paragraph : No impairment  Oral Reading: No impairment  Overall Impairment Severity: None    Verbal Expression:   Verbal Expression  Initiation: No impairment  Automatic Speech Task: No impairment  Repetition: No impairment  Naming: No impairment  Sentence Completion: No impairment  Sentence Formulation: No impairment  Conversation: No impairment  Overall Impairment: None    Written Expression:   Written Expression  Pre-Morbid Dominant Hand: Right  Legibility : Decreased legibility;Uses dominant hand(essential tremor)    Neuro-Linguistics:  Immediate memory 50%tile  Recent memory 75%tile  Temporal Orientation  -recent memory 50%tile  -remote memory 91%tile  Problem solving and reasoning 37%tile  Organization 75%tile  Auditory Processing and Retention 50%tile    Pragmatics: WNL's    Assessment only; No treatment(s) provided today  __________________________________________________________________________________________________  History of Present Injury/Illness (Reason for Referral): CVA   Treatment Assessment:  Evaluation completed. Progression/Medical Necessity:   · Patient is expected to demonstrate progress in cognitive ability to increase independence with activities of daily living. Compliance with Program/Exercises: Will assess as treatment progresses}. Reason for Continuation of Services/Other Comments:  · Patient continues to require skilled intervention due to CVA. Recommendations/Intent for next treatment session: \"Treatment next visit will focus on goals\".     Total Treatment Duration:  Time In: 1100   Time Out: [facility], Kristina Kaur. Trang Russell

## 2020-03-18 ENCOUNTER — HOSPITAL ENCOUNTER (OUTPATIENT)
Dept: PHYSICAL THERAPY | Age: 85
Discharge: HOME | End: 2020-03-18
Attending: INTERNAL MEDICINE
Payer: MEDICARE

## 2020-03-18 PROCEDURE — 92507 TX SP LANG VOICE COMM INDIV: CPT | Performed by: SPEECH-LANGUAGE PATHOLOGIST

## 2020-03-18 NOTE — PROGRESS NOTES
Madi Mathews  : 1935  [place]  Secondary: [address] at [place] [id number] [facility], [address] [place] [address],[address], [facility] [address]  [phone]   [phone]         OUTPATIENT SPEECH LANGUAGE PATHOLOGY: Daily Note 1    ICD-10: Treatment Diagnosis: Cognitive Communication deficit R41.841   REFERRING PHYSICIAN: Deepa Alba MD MD Orders: Evaluate and Treat  Return Physician Appointment: Unknown  PAST MEDICAL HISTORY:   Mr. Carline Egan is a 81 y.o. male who  has a past medical history of Abdominal pain (2016), Acute prostatitis, Backache, unspecified, Calculus of kidney, Cancer ([facility] [address] Diabetes ([facility] [address] Diverticulosis (14), Edema (2016), Elevated prostate specific antigen (PSA), Essential hypertension, benign, GERD (gastroesophageal reflux disease), H. pylori infection (14), History of barium enema (3/18/14), History of BPH, Hypertension, Hypertrophy of prostate without urinary obstruction and other lower urinary tract symptoms (LUTS), Impotence of organic origin, Lumbago, Malignant neoplasm of prostate ([facility] [address] Microscopic hematuria, Multiple renal cysts (14), Osteopenia determined by x-ray, Other ill-defined conditions(799.89), PVCs (premature ventricular contractions) (2016), and SSS (sick sinus syndrome) ([facility] [address] (2016). He also  has a past surgical history that includes hx other surgical; hx hernia repair; hx radical prostatectomy; hx urological; hx tonsil and adenoidectomy; hx cholecystectomy; hx colonoscopy (); and hx endoscopy (2017). MEDICAL/REFERRING DIAGNOSIS: speech  DATE OF ONSET: 2019  PRIOR LEVEL OF FUNCTION: Independent ADL's  PRECAUTIONS/ALLERGIES: Seasonal allergies   ASSESSMENT:  Patient is an 81year old male who was referred for speech evaluation due to CVA. He suffered a CVA on 2019. He was admitted to [facility] and only stayed just two days and was discharged home.   Patient reports difficulty with memory recall. He does have some difficulty with names as well. He reports that he doesn't have issues with long term recall just short term. He reports that he was having some difficulty with his memory prior to his CVA. Wife reports that he's having some difficulty with reading, writing, and balancing checkbook. Patient used to read a lot and now he's not reading as much. He also endorses some difficulty with adapting to change especially if things change with how he checks his finances online etc.  He was independent with ADL's prior to his CVA. Patient is present this date. He's had no falls. He is complains of dizziness. He states that he's dizzy when he first wakes up. He completed cognitive tasks at min A this date. We discussed attention and memory strategies as well. Please see below for specific tasks. Patient will benefit from skilled intervention to address the above impairments. ?????? ? ? This section established at most recent assessment??????????  PROBLEM LIST (Impairments causing functional limitations):  1. Cognitive decline  GOALS: (Goals have been discussed and agreed upon with patient.)  SHORT-TERM FUNCTIONAL GOALS: Time Frame: 12  1. The patient will complete a  (sustained, selective, alternating , divided) attention task with  no more than x number of redirections when given min  (verbal, visual, tactile) cues with 80%  accuracy. 2. The patient will demonstrate adequate attention to therapy tasks with no more than   two redirections in a 45-60 min session when given min cues with 80% accuracy. 3. The patient will demonstrate the ability to follow multi-step directions related to functional living  environment with 80%verbal, visual and tactile cues in order to increase functional integration  into environment  4.  The patient will demonstrate recall of functional information following a/an (immediate, shortterm,  long-term) delay with 75% accuracy with mod cues in order to increase functional integration into environment. 5. The patient will complete a mental manipulation task with 80% accuracy and mincueing in order  to increase functional integration into environment. 6. The patient will complete executive functioning skills such as money and mediation management at Mod I 90% accuracy. DISCHARGE GOALS: Time Frame: 3 months  Client will develop functional, cognitive-linguistic-based skills and utilize compensatory  strategies to communicate wants and needs effectively, maintain safety during ADLs and  participate socially in functional living environment  REHABILITATION POTENTIAL FOR STATED GOALS: [address]:  INTERVENTIONS PLANNED: (Benefits and precautions of therapy have been discussed with the patient.)  1. Speech therapy  TREATMENT PLAN EFFECTIVE DATES: 3/16/2020 TO 6/14/2020 (90 days). FREQUENCY/DURATION: Continue to follow patient 2 times a week for 90 days to address above goals. Regarding Beto Corrales's therapy, I certify that the treatment plan above will be carried out by a therapist or under their direction. Thank you for this referral,  DELVIN Mariscal Ed CCC-SLP                  Referring Physician Signature: Mika Lindsay MD     Date      SUBJECTIVE:  Alert  Present Symptoms: Cognitive deficits  Pain Intensity 1: 0  Current Medications:   Current Outpatient Medications on File Prior to Encounter   Medication Sig Dispense Refill    famotidine (PEPCID) 20 mg tablet Take 1 Tab by mouth two (2) times a day. 60 Tab 5    OTHER Indications: Tynelol 500 mg      varicella-zoster recombinant, PF, (SHINGRIX, PF,) 50 mcg/0.5 mL susr injection 0.5mL by IntraMUSCular route once now and then repeat in 2-6 months 0.5 mL 1    dilTIAZem CD (CARDIZEM CD) 120 mg ER capsule Take 1 Cap by mouth daily. 30 Cap 5    magnesium oxide (MAG-OX) 400 mg tablet TAKE 1 TAB BY MOUTH TWO (2) TIMES A DAY.  120 Tab 3    cloNIDine HCl (CATAPRES) 0.1 mg tablet Take 1 Tab by mouth daily as needed (take for BP greater than 160/90). 30 Tab 0    meclizine (ANTIVERT) 25 mg tablet Take 1 Tab by mouth three (3) times daily as needed for Dizziness. 90 Tab 2    valsartan (DIOVAN) 80 mg tablet Take 1 Tab by mouth two (2) times a day. 60 Tab 3    aspirin 81 mg chewable tablet Take 1 Tab by mouth daily. 30 Tab 0    atorvastatin (LIPITOR) 80 mg tablet Take 1 Tab by mouth nightly. 90 Tab 3    fish oil-dha-epa 1,200-144-216 mg cap Take  by mouth.  docusate sodium (STOOL SOFTENER) 100 mg tab Take  by mouth.  OTHER Organic Smooth Move for constipation      fluticasone propionate (FLONASE) 50 mcg/actuation nasal spray 2 Sprays by Both Nostrils route daily. 1 Bottle 5    triamcinolone acetonide (KENALOG) 0.025 % topical cream Apply  to affected area two (2) times a day. use thin layer      betamethasone dipropionate (DIPROLENE) 0.05 % ointment Apply  to affected area two (2) times a day.  fexofenadine (ALLEGRA) 180 mg tablet Take 180 mg by mouth daily as needed for Allergies.  hydrocortisone (HYTONE) 2.5 % topical cream Apply  to affected area two (2) times a day. use thin layer 30 g 0    cholecalciferol, vitamin D3, (VITAMIN D3) 2,000 unit tab Take  by mouth.  TURMERIC PO Take  by mouth.  folic acid-vit P7-HON R65 2.2-25-1 mg tab Take  by mouth.  nystatin-triamcinolone (MYCOLOG II) topical cream Apply  to affected area two (2) times daily as needed for Skin Irritation. 30 g 1     No current facility-administered medications on file prior to encounter. Date Last Reviewed: 3/18/20  Social History/Home Situation:       Work/Activity History: Retired     OBJECTIVE:  Objective Measure:   Tool Used: National Outcomes Measurement System: Functional Communication Measures: ATTENTION  Score:  Initial: 4 Most Recent: X (Date: -- )   Interpretation of Tool:  This measure describes the change in functional communication status subsequent to speech-language pathology treatment of patients who have attention deficits. o Level 1:  Attention is nonfunctional. The individual is generally unresponsive to most stimuli.  o Level 2: The individual can briefly attend with consistent maximal stimulation, but not long enough to complete even simple living tasks. o Level 3: The individual maintains attention over time to complete simple living tasks of short duration with consistent maximal cueing in the absence of distracting stimuli.  o Level 4: The individual maintains attention during simple living tasks of multiple steps and long duration within a minimally distracting environment with consistent minimal cueing. o Level 5: The individual maintains attention within simple living activities with occasional minimal cues within distracting environments. The individual requires increased cueing to start, continue, and change attention during complex activities. o Level 6: The individual maintains attention within complex activities, and can attend simultaneously to multiple demands with rare minimal cues. The individual usually uses compensatory strategies when encountering difficulty. The individual has mild difficulty or takes more than a reasonable amount of time to attend to multiple tasks/stimuli  o Level 7: The individuals ability to participate in vocational, a vocational or social activities is not limited by attentional abilities. Independent functioning may occasionally include the use of compensatory strategies. Score Level 7 Level 6 Level 5 Level 4 Level 3 Level 2 Level 1   Modifier CH CI CJ CK CL CM CN       Tool Used: National Outcomes Measurement System: Functional Communication Measures: MEMORY  Score:  Initial: 4 Most Recent: X (Date: -- )   Interpretation of Tool: This measure describes the change in functional communication status subsequent to speech-language pathology treatment of patients who have memory deficits.   o Level 1:  The individual is unable to recall any information, regardless of cueing. o Level 2: The individual consistently requires maximal verbal cues or uses external aids to recall personal information (e.g., family members, biographical information, physical location, etc.) in structured environments. o Level 3: The individual usually requires maximum cues to recall or use external aids for simple routine and personal information (e.g., schedule, names of familiar staff, location of therapy areas, etc.) in structured environments. o Level 4: The individual occasionally requires minimal cues to recall or use external memory aids for simple routine and personal information in structured environments. The individual requires consistent maximal cues to recall or use memory aids for complex and novel information (e.g., carry out multiple steps activities, accommodate schedule changes, anticipate meal times, etc.), plan and follow through on simple future events (e.g., use calendar to keep appointments, use log books to complete a single assignment/task, etc.) in structured environments. o Level 5 The individual consistently requires minimal cues to recall or use external memory aids for complex and novel information. The individual consistently requires minimal cues to plan and follow through on complex future events (e.g., menu planning and meal preparation, planning a party, etc.).  o Level 6: The individual is able to recall or use external aids/memory strategies for complex information and planning complex future events most of the time. When there is a breakdown in the use of recall/memory strategies/external memory aids, the individual occasionally requires minimal cues. These breakdowns may occasionally interfere with the individuals functioning in vocational, avocational, and social activities. o Level 7:  The individual is successful and independent in recalling or using external aids/memory strategies for complex information and planning future events in all vocational, avocational, and social activities. Score Level 7 Level 6 Level 5 Level 4 Level 3 Level 2 Level 1   Modifier CH CI CJ CK CL CM CN     Oral Motor Structure/Speech:       SPEECH-LANGUAGE COGNITIVE EVALUATION  Tests Given: [employer] Information Processing Assessment    Mental Status: Alert     Neuro-Linguistics:  1. Attention task:   2. Without strategy or distraction independently at 100% accuracy   2nd and 3rd trial with strategy and distraction: independently and one error but able to self correct     2. Orientation questions:   Day of the week: +  Date: +  Month +  Year +    3. Recalled recent events at: able to recall events with minimal cues, he just required time to process     4. Followed oral and written directions 2 step and 4 components   1st trial: Independently 100% accuracy  2nd trial: Mod A with 65% accuracy decreased attention and concentration      Pragmatics: WNL's    Cognitive Skills Activities: Activities/Procedures listed utilized to improve and/or restore cognitive function as related to memory and compensatory strategies for recall. Required moderate manual and   cueing to improve improve recall of information and perform graded activities focusing on attention skills. __________________________________________________________________________________________________  History of Present Injury/Illness (Reason for Referral): CVA   Treatment Assessment:  Evaluation completed. Progression/Medical Necessity:   · Patient is expected to demonstrate progress in cognitive ability to increase independence with activities of daily living. Compliance with Program/Exercises: Will assess as treatment progresses}. Reason for Continuation of Services/Other Comments:  · Patient continues to require skilled intervention due to CVA. Recommendations/Intent for next treatment session: \"Treatment next visit will focus on goals\".     Total Treatment Duration:  Time In: 1100   Time Out: 1011 [facility], Yadira Castillo. Leena Castillo

## 2020-03-25 ENCOUNTER — APPOINTMENT (OUTPATIENT)
Dept: PHYSICAL THERAPY | Age: 85
End: 2020-03-25
Attending: INTERNAL MEDICINE
Payer: MEDICARE

## 2020-03-30 ENCOUNTER — APPOINTMENT (OUTPATIENT)
Dept: PHYSICAL THERAPY | Age: 85
End: 2020-03-30
Attending: INTERNAL MEDICINE
Payer: MEDICARE

## 2020-05-13 NOTE — PROGRESS NOTES
Capo Dumont  : 1935  [place]  Secondary: [address] at [address], [address] [place] [address],[address], [address] [facility]  [phone]   [phone]         OUTPATIENT SPEECH LANGUAGE PATHOLOGY: Daily Note 2    ICD-10: Treatment Diagnosis: Cognitive Communication deficit R41.841   REFERRING PHYSICIAN: Jaimee Katz MD MD Orders: Evaluate and Treat  Return Physician Appointment: Unknown  PAST MEDICAL HISTORY:   Mr. Chuyita Blankenship is a 81 y.o. male who  has a past medical history of Abdominal pain (2016), Acute prostatitis, Backache, unspecified, Calculus of kidney, Cancer ([facility] [address] Diabetes ([facility] [address] Diverticulosis (14), Edema (2016), Elevated prostate specific antigen (PSA), Essential hypertension, benign, GERD (gastroesophageal reflux disease), H. pylori infection (14), History of barium enema (3/18/14), History of BPH, Hypertension, Hypertrophy of prostate without urinary obstruction and other lower urinary tract symptoms (LUTS), Impotence of organic origin, Lumbago, Malignant neoplasm of prostate ([facility] [address] Microscopic hematuria, Multiple renal cysts (14), Osteopenia determined by x-ray, Other ill-defined conditions(799.89), PVCs (premature ventricular contractions) (2016), and SSS (sick sinus syndrome) ([facility] [address] (2016). He also  has a past surgical history that includes hx other surgical; hx hernia repair; hx radical prostatectomy; hx urological; hx tonsil and adenoidectomy; hx cholecystectomy; hx colonoscopy (); and hx endoscopy (2017). MEDICAL/REFERRING DIAGNOSIS: speech  DATE OF ONSET: 2019  PRIOR LEVEL OF FUNCTION: Independent ADL's  PRECAUTIONS/ALLERGIES: Seasonal allergies   ASSESSMENT:  Patient is an 81year old male who was referred for speech evaluation due to CVA. He suffered a CVA on 2019. He was admitted to [facility] and only stayed just two days and was discharged home.   Patient reports difficulty with memory recall. He does have some difficulty with names as well. He reports that he doesn't have issues with long term recall just short term. He reports that he was having some difficulty with his memory prior to his CVA. Wife reports that he's having some difficulty with reading, writing, and balancing checkbook. Patient used to read a lot and now he's not reading as much. He also endorses some difficulty with adapting to change especially if things change with how he checks his finances online etc.  He was independent with ADL's prior to his CVA. Patient is present this date. Patient has no complaints this date. Reviewed homework. See below for specific tasks. He continues to have decreased memory and attention that negatively impacts his ADL's. Patient will benefit from skilled intervention to address the above impairments. ?????? ? ? This section established at most recent assessment??????????  PROBLEM LIST (Impairments causing functional limitations):  1. Cognitive decline  GOALS: (Goals have been discussed and agreed upon with patient.)  SHORT-TERM FUNCTIONAL GOALS: Time Frame: 12  1. The patient will complete a  (sustained, selective, alternating , divided) attention task with  no more than x number of redirections when given min  (verbal, visual, tactile) cues with 80%  accuracy. 2. The patient will demonstrate adequate attention to therapy tasks with no more than   two redirections in a 45-60 min session when given min cues with 80% accuracy. 3. The patient will demonstrate the ability to follow multi-step directions related to functional living  environment with 80%verbal, visual and tactile cues in order to increase functional integration  into environment  4.  The patient will demonstrate recall of functional information following a/an (immediate, shortterm,  long-term) delay with 75% accuracy with mod cues in order to increase functional integration into environment. 5. The patient will complete a mental manipulation task with 80% accuracy and mincueing in order  to increase functional integration into environment. 6. The patient will complete executive functioning skills such as money and mediation management at Mod I 90% accuracy. DISCHARGE GOALS: Time Frame: 3 months  Client will develop functional, cognitive-linguistic-based skills and utilize compensatory  strategies to communicate wants and needs effectively, maintain safety during ADLs and  participate socially in functional living environment  REHABILITATION POTENTIAL FOR STATED GOALS: [address]:  INTERVENTIONS PLANNED: (Benefits and precautions of therapy have been discussed with the patient.)  1. Speech therapy  TREATMENT PLAN EFFECTIVE DATES: 3/14/2020 TO 6/14/2020 (90 days). FREQUENCY/DURATION: Continue to follow patient 2 times a week for 90 days to address above goals. Regarding Deny Corrales's therapy, I certify that the treatment plan above will be carried out by a therapist or under their direction. Thank you for this referral,  DELVIN Kelly Ed CCC-SLP                  Referring Physician Signature: Royer Flowers MD     Date      SUBJECTIVE:  Alert  Present Symptoms: Cognitive deficits  Pain Intensity 1: 0  Current Medications:   Current Outpatient Medications on File Prior to Encounter   Medication Sig Dispense Refill    valsartan (Diovan) 80 mg tablet Take 1 Tab by mouth two (2) times a day. 60 Tab 5    famotidine (PEPCID) 20 mg tablet Take 1 Tab by mouth two (2) times a day. 60 Tab 5    OTHER Indications: Tynelol 500 mg      varicella-zoster recombinant, PF, (SHINGRIX, PF,) 50 mcg/0.5 mL susr injection 0.5mL by IntraMUSCular route once now and then repeat in 2-6 months 0.5 mL 1    dilTIAZem CD (CARDIZEM CD) 120 mg ER capsule Take 1 Cap by mouth daily. 30 Cap 5    magnesium oxide (MAG-OX) 400 mg tablet TAKE 1 TAB BY MOUTH TWO (2) TIMES A DAY.  120 Tab 3    cloNIDine HCl (CATAPRES) 0.1 mg tablet Take 1 Tab by mouth daily as needed (take for BP greater than 160/90). 30 Tab 0    meclizine (ANTIVERT) 25 mg tablet Take 1 Tab by mouth three (3) times daily as needed for Dizziness. 90 Tab 2    aspirin 81 mg chewable tablet Take 1 Tab by mouth daily. 30 Tab 0    atorvastatin (LIPITOR) 80 mg tablet Take 1 Tab by mouth nightly. 90 Tab 3    fish oil-dha-epa 1,200-144-216 mg cap Take  by mouth.  docusate sodium (STOOL SOFTENER) 100 mg tab Take  by mouth.  OTHER Organic Smooth Move for constipation      fluticasone propionate (FLONASE) 50 mcg/actuation nasal spray 2 Sprays by Both Nostrils route daily. 1 Bottle 5    triamcinolone acetonide (KENALOG) 0.025 % topical cream Apply  to affected area two (2) times a day. use thin layer      betamethasone dipropionate (DIPROLENE) 0.05 % ointment Apply  to affected area two (2) times a day.  fexofenadine (ALLEGRA) 180 mg tablet Take 180 mg by mouth daily as needed for Allergies.  hydrocortisone (HYTONE) 2.5 % topical cream Apply  to affected area two (2) times a day. use thin layer 30 g 0    cholecalciferol, vitamin D3, (VITAMIN D3) 2,000 unit tab Take  by mouth.  TURMERIC PO Take  by mouth.  folic acid-vit I4-OLQ T69 2.2-25-1 mg tab Take  by mouth.  nystatin-triamcinolone (MYCOLOG II) topical cream Apply  to affected area two (2) times daily as needed for Skin Irritation. 30 g 1     No current facility-administered medications on file prior to encounter. Date Last Reviewed: 5/14/2020  Social History/Home Situation:       Work/Activity History: Retired     OBJECTIVE:  Objective Measure:   Tool Used: National Outcomes Measurement System: Functional Communication Measures: ATTENTION  Score:  Initial: 4 Most Recent: X (Date: -- )   Interpretation of Tool:  This measure describes the change in functional communication status subsequent to speech-language pathology treatment of patients who have attention deficits. o Level 1:  Attention is nonfunctional. The individual is generally unresponsive to most stimuli.  o Level 2: The individual can briefly attend with consistent maximal stimulation, but not long enough to complete even simple living tasks. o Level 3: The individual maintains attention over time to complete simple living tasks of short duration with consistent maximal cueing in the absence of distracting stimuli.  o Level 4: The individual maintains attention during simple living tasks of multiple steps and long duration within a minimally distracting environment with consistent minimal cueing. o Level 5: The individual maintains attention within simple living activities with occasional minimal cues within distracting environments. The individual requires increased cueing to start, continue, and change attention during complex activities. o Level 6: The individual maintains attention within complex activities, and can attend simultaneously to multiple demands with rare minimal cues. The individual usually uses compensatory strategies when encountering difficulty. The individual has mild difficulty or takes more than a reasonable amount of time to attend to multiple tasks/stimuli  o Level 7: The individuals ability to participate in vocational, a vocational or social activities is not limited by attentional abilities. Independent functioning may occasionally include the use of compensatory strategies. Score Level 7 Level 6 Level 5 Level 4 Level 3 Level 2 Level 1   Modifier CH CI CJ CK CL CM CN       Tool Used: National Outcomes Measurement System: Functional Communication Measures: MEMORY  Score:  Initial: 4 Most Recent: X (Date: -- )   Interpretation of Tool: This measure describes the change in functional communication status subsequent to speech-language pathology treatment of patients who have memory deficits.   o Level 1:  The individual is unable to recall any information, regardless of cueing. o Level 2: The individual consistently requires maximal verbal cues or uses external aids to recall personal information (e.g., family members, biographical information, physical location, etc.) in structured environments. o Level 3: The individual usually requires maximum cues to recall or use external aids for simple routine and personal information (e.g., schedule, names of familiar staff, location of therapy areas, etc.) in structured environments. o Level 4: The individual occasionally requires minimal cues to recall or use external memory aids for simple routine and personal information in structured environments. The individual requires consistent maximal cues to recall or use memory aids for complex and novel information (e.g., carry out multiple steps activities, accommodate schedule changes, anticipate meal times, etc.), plan and follow through on simple future events (e.g., use calendar to keep appointments, use log books to complete a single assignment/task, etc.) in structured environments. o Level 5 The individual consistently requires minimal cues to recall or use external memory aids for complex and novel information. The individual consistently requires minimal cues to plan and follow through on complex future events (e.g., menu planning and meal preparation, planning a party, etc.).  o Level 6: The individual is able to recall or use external aids/memory strategies for complex information and planning complex future events most of the time. When there is a breakdown in the use of recall/memory strategies/external memory aids, the individual occasionally requires minimal cues. These breakdowns may occasionally interfere with the individuals functioning in vocational, avocational, and social activities. o Level 7:  The individual is successful and independent in recalling or using external aids/memory strategies for complex information and planning future events in all vocational, avocational, and social activities. Score Level 7 Level 6 Level 5 Level 4 Level 3 Level 2 Level 1   Modifier CH CI CJ CK CL CM CN     Oral Motor Structure/Speech:       SPEECH-LANGUAGE COGNITIVE EVALUATION  Tests Given: [employer] Information Processing Assessment    Mental Status: Alert     Neuro-Linguistics:    1. Recalled recent events: with the use of his calendar at mod A     2. Discussed memory strategies and with good recall of strategies. 3. Daily Orientation:   Day of the week +  Month +  Date +  Year +    4. Visual attention task at:   1st trial: 1 error  2nd trial: 3 errors  3rd trial: 2 errors  4th trial: 5 errors      Pragmatics: WNL's    Cognitive Skills Activities: Activities/Procedures listed utilized to improve and/or restore cognitive function as related to memory and compensatory strategies for recall. Required moderate manual and   cueing to improve improve recall of information and perform graded activities focusing on attention skills. __________________________________________________________________________________________________  History of Present Injury/Illness (Reason for Referral): CVA   Treatment Assessment:  Evaluation completed. Progression/Medical Necessity:   · Patient is expected to demonstrate progress in cognitive ability to increase independence with activities of daily living. Compliance with Program/Exercises: Will assess as treatment progresses}. Reason for Continuation of Services/Other Comments:  · Patient continues to require skilled intervention due to CVA. Recommendations/Intent for next treatment session: \"Treatment next visit will focus on goals\". Total Treatment Duration:  Time In: 1300   Time Out: 700 [facility]. Jessee Parada

## 2020-05-14 ENCOUNTER — HOSPITAL ENCOUNTER (OUTPATIENT)
Dept: PHYSICAL THERAPY | Age: 85
Discharge: HOME | End: 2020-05-14
Attending: INTERNAL MEDICINE
Payer: MEDICARE

## 2020-05-14 PROCEDURE — 92507 TX SP LANG VOICE COMM INDIV: CPT | Performed by: SPEECH-LANGUAGE PATHOLOGIST

## 2020-05-22 ENCOUNTER — HOSPITAL ENCOUNTER (OUTPATIENT)
Dept: PHYSICAL THERAPY | Age: 85
Discharge: HOME | End: 2020-05-22
Attending: INTERNAL MEDICINE
Payer: MEDICARE

## 2020-05-22 PROCEDURE — 92507 TX SP LANG VOICE COMM INDIV: CPT | Performed by: SPEECH-LANGUAGE PATHOLOGIST

## 2020-05-22 NOTE — PROGRESS NOTES
Maritza Billingsley  : 1935  [place]  Secondary: [address] at [place] [id number] [facility], [address] [place] [address],[address], [facility].  [phone]   [phone]         OUTPATIENT SPEECH LANGUAGE PATHOLOGY: Daily Note 3    ICD-10: Treatment Diagnosis: Cognitive Communication deficit R41.841   REFERRING PHYSICIAN: Stormy Staples MD MD Orders: Evaluate and Treat  Return Physician Appointment: Unknown  PAST MEDICAL HISTORY:   Mr. Zaheer Mendez is a 81 y.o. male who  has a past medical history of Abdominal pain (2016), Acute prostatitis, Backache, unspecified, Calculus of kidney, Cancer ([facility] [address] Diabetes ([facility] [address] Diverticulosis (14), Edema (2016), Elevated prostate specific antigen (PSA), Essential hypertension, benign, GERD (gastroesophageal reflux disease), H. pylori infection (14), History of barium enema (3/18/14), History of BPH, Hypertension, Hypertrophy of prostate without urinary obstruction and other lower urinary tract symptoms (LUTS), Impotence of organic origin, Lumbago, Malignant neoplasm of prostate ([address] Microscopic hematuria, Multiple renal cysts (14), Osteopenia determined by x-ray, Other ill-defined conditions(799.89), PVCs (premature ventricular contractions) (2016), and SSS (sick sinus syndrome) ([facility] [address] (2016). He also  has a past surgical history that includes hx other surgical; hx hernia repair; hx radical prostatectomy; hx urological; hx tonsil and adenoidectomy; hx cholecystectomy; hx colonoscopy (); and hx endoscopy (2017). MEDICAL/REFERRING DIAGNOSIS: speech  DATE OF ONSET: 2019  PRIOR LEVEL OF FUNCTION: Independent ADL's  PRECAUTIONS/ALLERGIES: Seasonal allergies   ASSESSMENT:  Patient is an 81year old male who was referred for speech evaluation due to CVA. He suffered a CVA on 2019. He was admitted to [facility] and only stayed just two days and was discharged home.   Patient reports difficulty with memory recall. He does have some difficulty with names as well. He reports that he doesn't have issues with long term recall just short term. He reports that he was having some difficulty with his memory prior to his CVA. Wife reports that he's having some difficulty with reading, writing, and balancing checkbook. Patient used to read a lot and now he's not reading as much. He also endorses some difficulty with adapting to change especially if things change with how he checks his finances online etc.  He was independent with ADL's prior to his CVA. Patient is present this date. Patient has no complaints this date. Reviewed homework. See below for specific tasks. He continues to have decreased memory and attention that negatively impacts his ADL's. Patient will benefit from skilled intervention to address the above impairments. ?????? ? ? This section established at most recent assessment??????????  PROBLEM LIST (Impairments causing functional limitations):  1. Cognitive decline  GOALS: (Goals have been discussed and agreed upon with patient.)  SHORT-TERM FUNCTIONAL GOALS: Time Frame: 12  1. The patient will complete a  (sustained, selective, alternating , divided) attention task with  no more than x number of redirections when given min  (verbal, visual, tactile) cues with 80%  accuracy. 2. The patient will demonstrate adequate attention to therapy tasks with no more than   two redirections in a 45-60 min session when given min cues with 80% accuracy. 3. The patient will demonstrate the ability to follow multi-step directions related to functional living  environment with 80%verbal, visual and tactile cues in order to increase functional integration  into environment  4.  The patient will demonstrate recall of functional information following a/an (immediate, shortterm,  long-term) delay with 75% accuracy with mod cues in order to increase functional integration into environment. 5. The patient will complete a mental manipulation task with 80% accuracy and mincueing in order  to increase functional integration into environment. 6. The patient will complete executive functioning skills such as money and mediation management at Mod I 90% accuracy. DISCHARGE GOALS: Time Frame: 3 months  Client will develop functional, cognitive-linguistic-based skills and utilize compensatory  strategies to communicate wants and needs effectively, maintain safety during ADLs and  participate socially in functional living environment  REHABILITATION POTENTIAL FOR STATED GOALS: [id number]:  INTERVENTIONS PLANNED: (Benefits and precautions of therapy have been discussed with the patient.)  1. Speech therapy  TREATMENT PLAN EFFECTIVE DATES: 3/14/2020 TO 6/14/2020 (90 days). FREQUENCY/DURATION: Continue to follow patient 2 times a week for 90 days to address above goals. Regarding Noe Corrales's therapy, I certify that the treatment plan above will be carried out by a therapist or under their direction. Thank you for this referral,  DELVIN Cesar Ed CCC-SLP                  Referring Physician Signature: Benedicto Lam MD     Date      SUBJECTIVE:  Alert  Present Symptoms: Cognitive deficits  Pain Intensity 1: 0  Current Medications:   Current Outpatient Medications on File Prior to Encounter   Medication Sig Dispense Refill    valsartan (Diovan) 80 mg tablet Take 1 Tab by mouth two (2) times a day. 60 Tab 5    famotidine (PEPCID) 20 mg tablet Take 1 Tab by mouth two (2) times a day. 60 Tab 5    OTHER Indications: Tynelol 500 mg      varicella-zoster recombinant, PF, (SHINGRIX, PF,) 50 mcg/0.5 mL susr injection 0.5mL by IntraMUSCular route once now and then repeat in 2-6 months 0.5 mL 1    dilTIAZem CD (CARDIZEM CD) 120 mg ER capsule Take 1 Cap by mouth daily. 30 Cap 5    magnesium oxide (MAG-OX) 400 mg tablet TAKE 1 TAB BY MOUTH TWO (2) TIMES A DAY.  120 Tab 3    cloNIDine HCl (CATAPRES) 0.1 mg tablet Take 1 Tab by mouth daily as needed (take for BP greater than 160/90). 30 Tab 0    meclizine (ANTIVERT) 25 mg tablet Take 1 Tab by mouth three (3) times daily as needed for Dizziness. 90 Tab 2    aspirin 81 mg chewable tablet Take 1 Tab by mouth daily. 30 Tab 0    atorvastatin (LIPITOR) 80 mg tablet Take 1 Tab by mouth nightly. 90 Tab 3    fish oil-dha-epa 1,200-144-216 mg cap Take  by mouth.  docusate sodium (STOOL SOFTENER) 100 mg tab Take  by mouth.  OTHER Organic Smooth Move for constipation      fluticasone propionate (FLONASE) 50 mcg/actuation nasal spray 2 Sprays by Both Nostrils route daily. 1 Bottle 5    triamcinolone acetonide (KENALOG) 0.025 % topical cream Apply  to affected area two (2) times a day. use thin layer      betamethasone dipropionate (DIPROLENE) 0.05 % ointment Apply  to affected area two (2) times a day.  fexofenadine (ALLEGRA) 180 mg tablet Take 180 mg by mouth daily as needed for Allergies.  hydrocortisone (HYTONE) 2.5 % topical cream Apply  to affected area two (2) times a day. use thin layer 30 g 0    cholecalciferol, vitamin D3, (VITAMIN D3) 2,000 unit tab Take  by mouth.  TURMERIC PO Take  by mouth.  folic acid-vit D4-GWD J77 2.2-25-1 mg tab Take  by mouth.  nystatin-triamcinolone (MYCOLOG II) topical cream Apply  to affected area two (2) times daily as needed for Skin Irritation. 30 g 1     No current facility-administered medications on file prior to encounter. Date Last Reviewed: 5/22/2020  Social History/Home Situation:       Work/Activity History: Retired     OBJECTIVE:  Objective Measure:   Tool Used: National Outcomes Measurement System: Functional Communication Measures: ATTENTION  Score:  Initial: 4 Most Recent: X (Date: -- )   Interpretation of Tool:  This measure describes the change in functional communication status subsequent to speech-language pathology treatment of patients who have attention deficits. o Level 1:  Attention is nonfunctional. The individual is generally unresponsive to most stimuli.  o Level 2: The individual can briefly attend with consistent maximal stimulation, but not long enough to complete even simple living tasks. o Level 3: The individual maintains attention over time to complete simple living tasks of short duration with consistent maximal cueing in the absence of distracting stimuli.  o Level 4: The individual maintains attention during simple living tasks of multiple steps and long duration within a minimally distracting environment with consistent minimal cueing. o Level 5: The individual maintains attention within simple living activities with occasional minimal cues within distracting environments. The individual requires increased cueing to start, continue, and change attention during complex activities. o Level 6: The individual maintains attention within complex activities, and can attend simultaneously to multiple demands with rare minimal cues. The individual usually uses compensatory strategies when encountering difficulty. The individual has mild difficulty or takes more than a reasonable amount of time to attend to multiple tasks/stimuli  o Level 7: The individuals ability to participate in vocational, a vocational or social activities is not limited by attentional abilities. Independent functioning may occasionally include the use of compensatory strategies. Score Level 7 Level 6 Level 5 Level 4 Level 3 Level 2 Level 1   Modifier CH CI CJ CK CL CM CN       Tool Used: National Outcomes Measurement System: Functional Communication Measures: MEMORY  Score:  Initial: 4 Most Recent: X (Date: -- )   Interpretation of Tool: This measure describes the change in functional communication status subsequent to speech-language pathology treatment of patients who have memory deficits.   o Level 1:  The individual is unable to recall any information, regardless of cueing. o Level 2: The individual consistently requires maximal verbal cues or uses external aids to recall personal information (e.g., family members, biographical information, physical location, etc.) in structured environments. o Level 3: The individual usually requires maximum cues to recall or use external aids for simple routine and personal information (e.g., schedule, names of familiar staff, location of therapy areas, etc.) in structured environments. o Level 4: The individual occasionally requires minimal cues to recall or use external memory aids for simple routine and personal information in structured environments. The individual requires consistent maximal cues to recall or use memory aids for complex and novel information (e.g., carry out multiple steps activities, accommodate schedule changes, anticipate meal times, etc.), plan and follow through on simple future events (e.g., use calendar to keep appointments, use log books to complete a single assignment/task, etc.) in structured environments. o Level 5 The individual consistently requires minimal cues to recall or use external memory aids for complex and novel information. The individual consistently requires minimal cues to plan and follow through on complex future events (e.g., menu planning and meal preparation, planning a party, etc.).  o Level 6: The individual is able to recall or use external aids/memory strategies for complex information and planning complex future events most of the time. When there is a breakdown in the use of recall/memory strategies/external memory aids, the individual occasionally requires minimal cues. These breakdowns may occasionally interfere with the individuals functioning in vocational, avocational, and social activities. o Level 7:  The individual is successful and independent in recalling or using external aids/memory strategies for complex information and planning future events in all vocational, avocational, and social activities. Score Level 7 Level 6 Level 5 Level 4 Level 3 Level 2 Level 1   Modifier CH CI CJ CK CL CM CN     Oral Motor Structure/Speech:       SPEECH-LANGUAGE COGNITIVE EVALUATION  Tests Given: Ross Information Processing Assessment    Mental Status: Alert     Neuro-Linguistics:    1. Recalled recent events: with the use of his calendar at min A    2. Discussed memory strategies and with good recall of strategies. 3. Daily Orientation:   Day of the week +  Month +  Date +  Year +    4. Visual attention task at:   1st trial: 0 errors  2nd trial: 1 error  3rd trial: 0 errors  4th trial: 5 errors    5. Recalled functional memory task (directions): Mod A with 75% accuracy due to fatigue    6. Mental manipulation (math):   1st trial: Independently 100% accuracy   2nd trial: Mod I 90% accuracy  3rd trial: Independently at 100% accuracy           Pragmatics: WNL's    Cognitive Skills Activities: Activities/Procedures listed utilized to improve and/or restore cognitive function as related to memory and compensatory strategies for recall. Required moderate manual and   cueing to improve improve recall of information and perform graded activities focusing on attention skills. __________________________________________________________________________________________________  History of Present Injury/Illness (Reason for Referral): CVA   Treatment Assessment:  Evaluation completed. Progression/Medical Necessity:   · Patient is expected to demonstrate progress in cognitive ability to increase independence with activities of daily living. Compliance with Program/Exercises: Will assess as treatment progresses}. Reason for Continuation of Services/Other Comments:  · Patient continues to require skilled intervention due to CVA. Recommendations/Intent for next treatment session: \"Treatment next visit will focus on goals\".     Total Treatment Duration:  Time In: 1300   Time Out: [facility], Sravan CCC-SLP

## 2020-06-01 PROBLEM — Z86.73 HISTORY OF ISCHEMIC STROKE: Status: ACTIVE | Noted: 2019-12-04

## 2020-06-02 ENCOUNTER — HOSPITAL ENCOUNTER (OUTPATIENT)
Dept: PHYSICAL THERAPY | Age: 85
Discharge: HOME | End: 2020-06-02
Attending: INTERNAL MEDICINE
Payer: MEDICARE

## 2020-06-02 PROCEDURE — 92507 TX SP LANG VOICE COMM INDIV: CPT | Performed by: SPEECH-LANGUAGE PATHOLOGIST

## 2020-06-04 ENCOUNTER — HOSPITAL ENCOUNTER (OUTPATIENT)
Dept: PHYSICAL THERAPY | Age: 85
Discharge: HOME | End: 2020-06-04
Attending: INTERNAL MEDICINE
Payer: MEDICARE

## 2020-06-04 PROCEDURE — 92507 TX SP LANG VOICE COMM INDIV: CPT | Performed by: SPEECH-LANGUAGE PATHOLOGIST

## 2020-06-04 NOTE — PROGRESS NOTES
Nino Maldonado  : 1935  [place]  Secondary: [address] at [address], [address] [place] [address],[address]  [phone]   [phone]         OUTPATIENT SPEECH LANGUAGE PATHOLOGY: Daily Note 3    ICD-10: Treatment Diagnosis: Cognitive Communication deficit R41.841   REFERRING PHYSICIAN: Leia Linares MD MD Orders: Evaluate and Treat  Return Physician Appointment: Unknown  PAST MEDICAL HISTORY:   Mr. Sanam Pena is a 81 y.o. male who  has a past medical history of Abdominal pain (2016), Acute prostatitis, Backache, unspecified, Calculus of kidney, Cancer ([facility] [address] Diabetes ([facility] [address] Diverticulosis (14), Edema (2016), Elevated prostate specific antigen (PSA), Essential hypertension, benign, GERD (gastroesophageal reflux disease), H. pylori infection (14), History of barium enema (3/18/14), History of BPH, Hypertension, Hypertrophy of prostate without urinary obstruction and other lower urinary tract symptoms (LUTS), Impotence of organic origin, Lumbago, Malignant neoplasm of prostate ([address] Microscopic hematuria, Multiple renal cysts (14), Osteopenia determined by x-ray, Other ill-defined conditions(799.89), PVCs (premature ventricular contractions) (2016), and SSS (sick sinus syndrome) ([facility] [address] (2016). He also  has a past surgical history that includes hx other surgical; hx hernia repair; hx radical prostatectomy; hx urological; hx tonsil and adenoidectomy; hx cholecystectomy; hx colonoscopy (); and hx endoscopy (2017). MEDICAL/REFERRING DIAGNOSIS: speech  DATE OF ONSET: 2019  PRIOR LEVEL OF FUNCTION: Independent ADL's  PRECAUTIONS/ALLERGIES: Seasonal allergies   ASSESSMENT:  Patient is an 81year old male who was referred for speech evaluation due to CVA. He suffered a CVA on 2019. He was admitted to [facility] and only stayed just two days and was discharged home.   Patient reports difficulty with memory recall. He does have some difficulty with names as well. He reports that he doesn't have issues with long term recall just short term. He reports that he was having some difficulty with his memory prior to his CVA. Wife reports that he's having some difficulty with reading, writing, and balancing checkbook. Patient used to read a lot and now he's not reading as much. He also endorses some difficulty with adapting to change especially if things change with how he checks his finances online etc.  He was independent with ADL's prior to his CVA. Patient is present this date. Patient has no complaints this date. Reviewed homework. See below for specific tasks. He continues to have decreased memory and attention that negatively impacts his ADL's. He does have some issues with higher level tasks such as medication management as he is not always taking his medications as prescribed. We discussed all medication changes by logging into his my chart and reading his latest visit summary from his PCP. He brought his medications in today with much confusion on what changes were made during his visit etc. I am concerned about his comprehension of these higher level things. He did have his medication set up appropriately in his pill box but some medications he is not taking as prescribed which concerns me. He also has no idea why he is taking some of the medications as well. Patient will benefit from skilled intervention to address the above impairments. ?????? ? ? This section established at most recent assessment??????????  PROBLEM LIST (Impairments causing functional limitations):  1. Cognitive decline  GOALS: (Goals have been discussed and agreed upon with patient.)  SHORT-TERM FUNCTIONAL GOALS: Time Frame: 12  1.  The patient will complete a  (sustained, selective, alternating , divided) attention task with  no more than x number of redirections when given min  (verbal, visual, tactile) cues with 80%  accuracy. 2. The patient will demonstrate adequate attention to therapy tasks with no more than   two redirections in a 45-60 min session when given min cues with 80% accuracy. 3. The patient will demonstrate the ability to follow multi-step directions related to functional living  environment with 80%verbal, visual and tactile cues in order to increase functional integration  into environment  4. The patient will demonstrate recall of functional information following a/an (immediate, shortterm,  long-term) delay with 75% accuracy with mod cues in order to increase functional integration into environment. 5. The patient will complete a mental manipulation task with 80% accuracy and mincueing in order  to increase functional integration into environment. 6. The patient will complete executive functioning skills such as money and mediation management at Mod I 90% accuracy. DISCHARGE GOALS: Time Frame: 3 months  Client will develop functional, cognitive-linguistic-based skills and utilize compensatory  strategies to communicate wants and needs effectively, maintain safety during ADLs and  participate socially in functional living environment  REHABILITATION POTENTIAL FOR STATED GOALS: [address]:  INTERVENTIONS PLANNED: (Benefits and precautions of therapy have been discussed with the patient.)  1. Speech therapy  TREATMENT PLAN EFFECTIVE DATES: 3/14/2020 TO 6/14/2020 (90 days). FREQUENCY/DURATION: Continue to follow patient 2 times a week for 90 days to address above goals. Regarding Fallon Spivey Savanna's therapy, I certify that the treatment plan above will be carried out by a therapist or under their direction. Thank you for this referral,  DELVIN Alexander Ed CCC-SLP                  Referring Physician Signature:  Miguelito Terry MD     Date      SUBJECTIVE:  Alert  Present Symptoms: Cognitive deficits     Current Medications:   Current Outpatient Medications on File Prior to Encounter   Medication Sig Dispense Refill    valsartan (Diovan) 80 mg tablet Take 1 Tab by mouth daily. 30 Tab 5    cloNIDine HCL (CATAPRES) 0.1 mg tablet Take 1 Tab by mouth daily as needed (take for BP greater than 160/90). 30 Tab 0    famotidine (PEPCID) 20 mg tablet Take 1 Tab by mouth two (2) times a day. 60 Tab 5    OTHER Indications: Tynelol 500 mg      dilTIAZem CD (CARDIZEM CD) 120 mg ER capsule Take 1 Cap by mouth daily. 30 Cap 5    magnesium oxide (MAG-OX) 400 mg tablet TAKE 1 TAB BY MOUTH TWO (2) TIMES A DAY. 120 Tab 3    meclizine (ANTIVERT) 25 mg tablet Take 1 Tab by mouth three (3) times daily as needed for Dizziness. 90 Tab 2    aspirin 81 mg chewable tablet Take 1 Tab by mouth daily. 30 Tab 0    atorvastatin (LIPITOR) 80 mg tablet Take 1 Tab by mouth nightly. 90 Tab 3    docusate sodium (STOOL SOFTENER) 100 mg tab Take  by mouth.  OTHER Organic Smooth Move for constipation      fluticasone propionate (FLONASE) 50 mcg/actuation nasal spray 2 Sprays by Both Nostrils route daily. 1 Bottle 5    triamcinolone acetonide (KENALOG) 0.025 % topical cream Apply  to affected area two (2) times a day. use thin layer      betamethasone dipropionate (DIPROLENE) 0.05 % ointment Apply  to affected area two (2) times a day.  fexofenadine (ALLEGRA) 180 mg tablet Take 180 mg by mouth daily as needed for Allergies.  hydrocortisone (HYTONE) 2.5 % topical cream Apply  to affected area two (2) times a day. use thin layer 30 g 0    cholecalciferol, vitamin D3, (VITAMIN D3) 2,000 unit tab Take  by mouth.  TURMERIC PO Take  by mouth.  folic acid-vit C2-EQR Q37 2.2-25-1 mg tab Take  by mouth.  nystatin-triamcinolone (MYCOLOG II) topical cream Apply  to affected area two (2) times daily as needed for Skin Irritation. 30 g 1     No current facility-administered medications on file prior to encounter.          Date Last Reviewed: 6/2/20  Social History/Home Situation:  Work/Activity History: Retired     OBJECTIVE:  Objective Measure: Tool Used: National Outcomes Measurement System: Functional Communication Measures: ATTENTION  Score:  Initial: 4 Most Recent: X (Date: -- )   Interpretation of Tool:  This measure describes the change in functional communication status subsequent to speech-language pathology treatment of patients who have attention deficits. o Level 1:  Attention is nonfunctional. The individual is generally unresponsive to most stimuli.  o Level 2: The individual can briefly attend with consistent maximal stimulation, but not long enough to complete even simple living tasks. o Level 3: The individual maintains attention over time to complete simple living tasks of short duration with consistent maximal cueing in the absence of distracting stimuli.  o Level 4: The individual maintains attention during simple living tasks of multiple steps and long duration within a minimally distracting environment with consistent minimal cueing. o Level 5: The individual maintains attention within simple living activities with occasional minimal cues within distracting environments. The individual requires increased cueing to start, continue, and change attention during complex activities. o Level 6: The individual maintains attention within complex activities, and can attend simultaneously to multiple demands with rare minimal cues. The individual usually uses compensatory strategies when encountering difficulty. The individual has mild difficulty or takes more than a reasonable amount of time to attend to multiple tasks/stimuli  o Level 7: The individuals ability to participate in vocational, a vocational or social activities is not limited by attentional abilities. Independent functioning may occasionally include the use of compensatory strategies.   Score Level 7 Level 6 Level 5 Level 4 Level 3 Level 2 Level 1   Modifier CH CI CJ CK CL CM CN       Tool Used: Christie Barajas Outcomes Measurement System: Functional Communication Measures: MEMORY  Score:  Initial: 4 Most Recent: X (Date: -- )   Interpretation of Tool: This measure describes the change in functional communication status subsequent to speech-language pathology treatment of patients who have memory deficits. o Level 1:  The individual is unable to recall any information, regardless of cueing. o Level 2: The individual consistently requires maximal verbal cues or uses external aids to recall personal information (e.g., family members, biographical information, physical location, etc.) in structured environments. o Level 3: The individual usually requires maximum cues to recall or use external aids for simple routine and personal information (e.g., schedule, names of familiar staff, location of therapy areas, etc.) in structured environments. o Level 4: The individual occasionally requires minimal cues to recall or use external memory aids for simple routine and personal information in structured environments. The individual requires consistent maximal cues to recall or use memory aids for complex and novel information (e.g., carry out multiple steps activities, accommodate schedule changes, anticipate meal times, etc.), plan and follow through on simple future events (e.g., use calendar to keep appointments, use log books to complete a single assignment/task, etc.) in structured environments. o Level 5 The individual consistently requires minimal cues to recall or use external memory aids for complex and novel information. The individual consistently requires minimal cues to plan and follow through on complex future events (e.g., menu planning and meal preparation, planning a party, etc.).  o Level 6: The individual is able to recall or use external aids/memory strategies for complex information and planning complex future events most of the time. When there is a breakdown in the use of recall/memory strategies/external memory aids, the individual occasionally requires minimal cues. These breakdowns may occasionally interfere with the individuals functioning in vocational, avocational, and social activities. o Level 7: The individual is successful and independent in recalling or using external aids/memory strategies for complex information and planning future events in all vocational, avocational, and social activities. Score Level 7 Level 6 Level 5 Level 4 Level 3 Level 2 Level 1   Modifier CH CI CJ CK CL CM CN     Oral Motor Structure/Speech:       SPEECH-LANGUAGE COGNITIVE EVALUATION  Tests Given: [employer] Information Processing Assessment    Mental Status: Alert     Neuro-Linguistics:    1. Recalled recent events: with the use of his calendar at min A    2. Discussed memory strategies and with good recall of strategies. 3. Daily Orientation:   Day of the week +  Month +  Date +  Year +    4. Discussed medication management and my chart with mod cues due to decreased comprehension and recall. Reviewed medications on how much to take and when with some cueing due to not following the medications as prescribed. Pragmatics: WNL's    Cognitive Skills Activities: Activities/Procedures listed utilized to improve and/or restore cognitive function as related to memory and compensatory strategies for recall. Required moderate manual and   cueing to improve improve recall of information and perform graded activities focusing on attention skills. __________________________________________________________________________________________________  History of Present Injury/Illness (Reason for Referral): CVA   Treatment Assessment:  Evaluation completed. Progression/Medical Necessity:   · Patient is expected to demonstrate progress in cognitive ability to increase independence with activities of daily living. Compliance with Program/Exercises: Will assess as treatment progresses}.    Reason for Continuation of Services/Other Comments:  · Patient continues to require skilled intervention due to CVA. Recommendations/Intent for next treatment session: \"Treatment next visit will focus on goals\". Total Treatment Duration:  Time In: 1015   Time Out: [address]. Hernan Santoyo

## 2020-06-04 NOTE — PROGRESS NOTES
Hany Frankel  : 1935  [place]  Secondary: [address] at [facility], [address] [address], [address]  [phone]   [phone]         OUTPATIENT SPEECH LANGUAGE PATHOLOGY: Daily Note 3    ICD-10: Treatment Diagnosis: Cognitive Communication deficit R41.841   REFERRING PHYSICIAN: Kait Reynolds MD MD Orders: Evaluate and Treat  Return Physician Appointment: Unknown  PAST MEDICAL HISTORY:   Mr. Misael Almazan is a 81 y.o. male who  has a past medical history of Abdominal pain (2016), Acute prostatitis, Backache, unspecified, Calculus of kidney, Cancer ([facility] [address] Diabetes ([address] Diverticulosis (14), Edema (2016), Elevated prostate specific antigen (PSA), Essential hypertension, benign, GERD (gastroesophageal reflux disease), H. pylori infection (14), History of barium enema (3/18/14), History of BPH, Hypertension, Hypertrophy of prostate without urinary obstruction and other lower urinary tract symptoms (LUTS), Impotence of organic origin, Lumbago, Malignant neoplasm of prostate ([address] Microscopic hematuria, Multiple renal cysts (14), Osteopenia determined by x-ray, Other ill-defined conditions(799.89), PVCs (premature ventricular contractions) (2016), and SSS (sick sinus syndrome) ([address] (2016). He also  has a past surgical history that includes hx other surgical; hx hernia repair; hx radical prostatectomy; hx urological; hx tonsil and adenoidectomy; hx cholecystectomy; hx colonoscopy (); and hx endoscopy (2017). MEDICAL/REFERRING DIAGNOSIS: speech  DATE OF ONSET: 2019  PRIOR LEVEL OF FUNCTION: Independent ADL's  PRECAUTIONS/ALLERGIES: Seasonal allergies   ASSESSMENT:  Patient is an 81year old male who was referred for speech evaluation due to CVA. He suffered a CVA on 2019. He was admitted to [facility] and only stayed just two days and was discharged home.   Patient reports difficulty with memory recall. He does have some difficulty with names as well. He reports that he doesn't have issues with long term recall just short term. He reports that he was having some difficulty with his memory prior to his CVA. Wife reports that he's having some difficulty with reading, writing, and balancing checkbook. Patient used to read a lot and now he's not reading as much. He also endorses some difficulty with adapting to change especially if things change with how he checks his finances online etc.  He was independent with ADL's prior to his CVA. Patient is present this date. Patient has no complaints this date. Reviewed homework. See below for specific tasks. He continues to have decreased memory and attention that negatively impacts his ADL's. He does have some issues with higher level tasks such as medication management as he is not always taking his medications as prescribed. We discussed all medication changes by logging into his my chart and reading his latest visit summary from his PCP. I asked him to bring all his medications on his next visit. Patient will benefit from skilled intervention to address the above impairments. ?????? ? ? This section established at most recent assessment??????????  PROBLEM LIST (Impairments causing functional limitations):  1. Cognitive decline  GOALS: (Goals have been discussed and agreed upon with patient.)  SHORT-TERM FUNCTIONAL GOALS: Time Frame: 12  1. The patient will complete a  (sustained, selective, alternating , divided) attention task with  no more than x number of redirections when given min  (verbal, visual, tactile) cues with 80%  accuracy. 2. The patient will demonstrate adequate attention to therapy tasks with no more than   two redirections in a 45-60 min session when given min cues with 80% accuracy.    3. The patient will demonstrate the ability to follow multi-step directions related to functional living  environment with 80%verbal, visual and tactile cues in order to increase functional integration  into environment  4. The patient will demonstrate recall of functional information following a/an (immediate, shortterm,  long-term) delay with 75% accuracy with mod cues in order to increase functional integration into environment. 5. The patient will complete a mental manipulation task with 80% accuracy and mincueing in order  to increase functional integration into environment. 6. The patient will complete executive functioning skills such as money and mediation management at Mod I 90% accuracy. DISCHARGE GOALS: Time Frame: 3 months  Client will develop functional, cognitive-linguistic-based skills and utilize compensatory  strategies to communicate wants and needs effectively, maintain safety during ADLs and  participate socially in functional living environment  REHABILITATION POTENTIAL FOR STATED GOALS: [address]:  INTERVENTIONS PLANNED: (Benefits and precautions of therapy have been discussed with the patient.)  1. Speech therapy  TREATMENT PLAN EFFECTIVE DATES: 3/14/2020 TO 6/14/2020 (90 days). FREQUENCY/DURATION: Continue to follow patient 2 times a week for 90 days to address above goals. Regarding Osito Corrales's therapy, I certify that the treatment plan above will be carried out by a therapist or under their direction. Thank you for this referral,  DELVIN Dong Ed CCC-SLP                  Referring Physician Signature: Stormy Staples MD     Date      SUBJECTIVE:  Alert  Present Symptoms: Cognitive deficits  Pain Intensity 1: 0  Current Medications:   Current Outpatient Medications on File Prior to Encounter   Medication Sig Dispense Refill    valsartan (Diovan) 80 mg tablet Take 1 Tab by mouth daily. 30 Tab 5    cloNIDine HCL (CATAPRES) 0.1 mg tablet Take 1 Tab by mouth daily as needed (take for BP greater than 160/90).  30 Tab 0    famotidine (PEPCID) 20 mg tablet Take 1 Tab by mouth two (2) times a day. 60 Tab 5    OTHER Indications: Tynelol 500 mg      dilTIAZem CD (CARDIZEM CD) 120 mg ER capsule Take 1 Cap by mouth daily. 30 Cap 5    magnesium oxide (MAG-OX) 400 mg tablet TAKE 1 TAB BY MOUTH TWO (2) TIMES A DAY. 120 Tab 3    meclizine (ANTIVERT) 25 mg tablet Take 1 Tab by mouth three (3) times daily as needed for Dizziness. 90 Tab 2    aspirin 81 mg chewable tablet Take 1 Tab by mouth daily. 30 Tab 0    atorvastatin (LIPITOR) 80 mg tablet Take 1 Tab by mouth nightly. 90 Tab 3    docusate sodium (STOOL SOFTENER) 100 mg tab Take  by mouth.  OTHER Organic Smooth Move for constipation      fluticasone propionate (FLONASE) 50 mcg/actuation nasal spray 2 Sprays by Both Nostrils route daily. 1 Bottle 5    triamcinolone acetonide (KENALOG) 0.025 % topical cream Apply  to affected area two (2) times a day. use thin layer      betamethasone dipropionate (DIPROLENE) 0.05 % ointment Apply  to affected area two (2) times a day.  fexofenadine (ALLEGRA) 180 mg tablet Take 180 mg by mouth daily as needed for Allergies.  hydrocortisone (HYTONE) 2.5 % topical cream Apply  to affected area two (2) times a day. use thin layer 30 g 0    cholecalciferol, vitamin D3, (VITAMIN D3) 2,000 unit tab Take  by mouth.  TURMERIC PO Take  by mouth.  folic acid-vit N5-SDJ P70 2.2-25-1 mg tab Take  by mouth.  nystatin-triamcinolone (MYCOLOG II) topical cream Apply  to affected area two (2) times daily as needed for Skin Irritation. 30 g 1     No current facility-administered medications on file prior to encounter. Date Last Reviewed: 6/2/20  Social History/Home Situation:       Work/Activity History: Retired     OBJECTIVE:  Objective Measure:   Tool Used: National Outcomes Measurement System: Functional Communication Measures: ATTENTION  Score:  Initial: 4 Most Recent: X (Date: -- )   Interpretation of Tool:  This measure describes the change in functional communication status subsequent to speech-language pathology treatment of patients who have attention deficits. o Level 1:  Attention is nonfunctional. The individual is generally unresponsive to most stimuli.  o Level 2: The individual can briefly attend with consistent maximal stimulation, but not long enough to complete even simple living tasks. o Level 3: The individual maintains attention over time to complete simple living tasks of short duration with consistent maximal cueing in the absence of distracting stimuli.  o Level 4: The individual maintains attention during simple living tasks of multiple steps and long duration within a minimally distracting environment with consistent minimal cueing. o Level 5: The individual maintains attention within simple living activities with occasional minimal cues within distracting environments. The individual requires increased cueing to start, continue, and change attention during complex activities. o Level 6: The individual maintains attention within complex activities, and can attend simultaneously to multiple demands with rare minimal cues. The individual usually uses compensatory strategies when encountering difficulty. The individual has mild difficulty or takes more than a reasonable amount of time to attend to multiple tasks/stimuli  o Level 7: The individuals ability to participate in vocational, a vocational or social activities is not limited by attentional abilities. Independent functioning may occasionally include the use of compensatory strategies. Score Level 7 Level 6 Level 5 Level 4 Level 3 Level 2 Level 1   Modifier CH CI CJ CK CL CM CN       Tool Used: National Outcomes Measurement System: Functional Communication Measures: MEMORY  Score:  Initial: 4 Most Recent: X (Date: -- )   Interpretation of Tool: This measure describes the change in functional communication status subsequent to speech-language pathology treatment of patients who have memory deficits.   o Level 1:  The individual is unable to recall any information, regardless of cueing. o Level 2: The individual consistently requires maximal verbal cues or uses external aids to recall personal information (e.g., family members, biographical information, physical location, etc.) in structured environments. o Level 3: The individual usually requires maximum cues to recall or use external aids for simple routine and personal information (e.g., schedule, names of familiar staff, location of therapy areas, etc.) in structured environments. o Level 4: The individual occasionally requires minimal cues to recall or use external memory aids for simple routine and personal information in structured environments. The individual requires consistent maximal cues to recall or use memory aids for complex and novel information (e.g., carry out multiple steps activities, accommodate schedule changes, anticipate meal times, etc.), plan and follow through on simple future events (e.g., use calendar to keep appointments, use log books to complete a single assignment/task, etc.) in structured environments. o Level 5 The individual consistently requires minimal cues to recall or use external memory aids for complex and novel information. The individual consistently requires minimal cues to plan and follow through on complex future events (e.g., menu planning and meal preparation, planning a party, etc.).  o Level 6: The individual is able to recall or use external aids/memory strategies for complex information and planning complex future events most of the time. When there is a breakdown in the use of recall/memory strategies/external memory aids, the individual occasionally requires minimal cues. These breakdowns may occasionally interfere with the individuals functioning in vocational, avocational, and social activities. o Level 7:  The individual is successful and independent in recalling or using external aids/memory strategies for complex information and planning future events in all vocational, avocational, and social activities. Score Level 7 Level 6 Level 5 Level 4 Level 3 Level 2 Level 1   Modifier CH CI CJ CK CL CM CN     Oral Motor Structure/Speech:       SPEECH-LANGUAGE COGNITIVE EVALUATION  Tests Given: [employer] Information Processing Assessment    Mental Status: Alert     Neuro-Linguistics:    1. Recalled recent events: with the use of his calendar at min A    2. Discussed memory strategies and with good recall of strategies. 3. Daily Orientation:   Day of the week +  Month +  Date +  Year +    4. Visual attention task at:   1st trial: 0 errors  2nd trial: 1 error  3rd trial: 0 errors  4th trial: 5 errors    5. Discussed medication management and my chart with mod cues due to decreased comprehension and recall        Pragmatics: WNL's    Cognitive Skills Activities: Activities/Procedures listed utilized to improve and/or restore cognitive function as related to memory and compensatory strategies for recall. Required moderate manual and   cueing to improve improve recall of information and perform graded activities focusing on attention skills. __________________________________________________________________________________________________  History of Present Injury/Illness (Reason for Referral): CVA   Treatment Assessment:  Evaluation completed. Progression/Medical Necessity:   · Patient is expected to demonstrate progress in cognitive ability to increase independence with activities of daily living. Compliance with Program/Exercises: Will assess as treatment progresses}. Reason for Continuation of Services/Other Comments:  · Patient continues to require skilled intervention due to CVA. Recommendations/Intent for next treatment session: \"Treatment next visit will focus on goals\". Total Treatment Duration:  Time In: 1020   Time Out: [address] Burak Kimble

## 2020-06-08 ENCOUNTER — HOSPITAL ENCOUNTER (OUTPATIENT)
Dept: PHYSICAL THERAPY | Age: 85
Discharge: HOME | End: 2020-06-08
Attending: INTERNAL MEDICINE
Payer: MEDICARE

## 2020-06-08 PROCEDURE — 92507 TX SP LANG VOICE COMM INDIV: CPT | Performed by: SPEECH-LANGUAGE PATHOLOGIST

## 2020-06-08 NOTE — PROGRESS NOTES
[employer]  : 1935  [place]  Secondary: [address] at [place] [id number] [facility], [address] [place] [address],[address], [facility] [address].  [phone]   [phone]         OUTPATIENT SPEECH LANGUAGE PATHOLOGY: Daily Note 3    ICD-10: Treatment Diagnosis: Cognitive Communication deficit R41.841   REFERRING PHYSICIAN: Albert Randolph MD MD Orders: Evaluate and Treat  Return Physician Appointment: Unknown  PAST MEDICAL HISTORY:   Mr. Rom Kilpatrick is a 81 y.o. male who  has a past medical history of Abdominal pain (2016), Acute prostatitis, Backache, unspecified, Calculus of kidney, Cancer ([facility] [address] Diabetes ([facility] [address] Diverticulosis (14), Edema (2016), Elevated prostate specific antigen (PSA), Essential hypertension, benign, GERD (gastroesophageal reflux disease), H. pylori infection (14), History of barium enema (3/18/14), History of BPH, Hypertension, Hypertrophy of prostate without urinary obstruction and other lower urinary tract symptoms (LUTS), Impotence of organic origin, Lumbago, Malignant neoplasm of prostate ([facility] [address] Microscopic hematuria, Multiple renal cysts (14), Osteopenia determined by x-ray, Other ill-defined conditions(799.89), PVCs (premature ventricular contractions) (2016), and SSS (sick sinus syndrome) ([facility] [address] (2016). He also  has a past surgical history that includes hx other surgical; hx hernia repair; hx radical prostatectomy; hx urological; hx tonsil and adenoidectomy; hx cholecystectomy; hx colonoscopy (); and hx endoscopy (2017). MEDICAL/REFERRING DIAGNOSIS: speech  DATE OF ONSET: 2019  PRIOR LEVEL OF FUNCTION: Independent ADL's  PRECAUTIONS/ALLERGIES: Seasonal allergies   ASSESSMENT:  Patient is an 81year old male who was referred for speech evaluation due to CVA. He suffered a CVA on 2019. He was admitted to [facility] and only stayed just two days and was discharged home.   Patient reports difficulty with memory recall. He does have some difficulty with names as well. He reports that he doesn't have issues with long term recall just short term. He reports that he was having some difficulty with his memory prior to his CVA. Wife reports that he's having some difficulty with reading, writing, and balancing checkbook. Patient used to read a lot and now he's not reading as much. He also endorses some difficulty with adapting to change especially if things change with how he checks his finances online etc.  He was independent with ADL's prior to his CVA. Patient is present this date. Patient has no complaints this date. Reviewed homework. See below for specific tasks. He continues to have decreased memory and attention that negatively impacts his ADL's. Patient will benefit from skilled intervention to address the above impairments. ?????? ? ? This section established at most recent assessment??????????  PROBLEM LIST (Impairments causing functional limitations):  1. Cognitive decline  GOALS: (Goals have been discussed and agreed upon with patient.)  SHORT-TERM FUNCTIONAL GOALS: Time Frame: 12  1. The patient will complete a  (sustained, selective, alternating , divided) attention task with  no more than x number of redirections when given min  (verbal, visual, tactile) cues with 80%  accuracy. 2. The patient will demonstrate adequate attention to therapy tasks with no more than   two redirections in a 45-60 min session when given min cues with 80% accuracy. 3. The patient will demonstrate the ability to follow multi-step directions related to functional living  environment with 80%verbal, visual and tactile cues in order to increase functional integration  into environment  4. The patient will demonstrate recall of functional information following a/an (immediate, shortterm,  long-term) delay with 75% accuracy with mod cues in order to increase functional integration into environment. 5. The patient will complete a mental manipulation task with 80% accuracy and mincueing in order  to increase functional integration into environment. 6. The patient will complete executive functioning skills such as money and mediation management at Mod I 90% accuracy. DISCHARGE GOALS: Time Frame: 3 months  Client will develop functional, cognitive-linguistic-based skills and utilize compensatory  strategies to communicate wants and needs effectively, maintain safety during ADLs and  participate socially in functional living environment  REHABILITATION POTENTIAL FOR STATED GOALS: [address]:  INTERVENTIONS PLANNED: (Benefits and precautions of therapy have been discussed with the patient.)  1. Speech therapy  TREATMENT PLAN EFFECTIVE DATES: 3/14/2020 TO 6/14/2020 (90 days). FREQUENCY/DURATION: Continue to follow patient 2 times a week for 90 days to address above goals. Regarding Crys Corrales's therapy, I certify that the treatment plan above will be carried out by a therapist or under their direction. Thank you for this referral,  DELVIN Arevalo Ed CCC-SLP                  Referring Physician Signature: Angelique Gomes MD     Date      SUBJECTIVE:  Alert  Present Symptoms: Cognitive deficits  Pain Intensity 1: 0  Current Medications:   Current Outpatient Medications on File Prior to Encounter   Medication Sig Dispense Refill    valsartan (Diovan) 80 mg tablet Take 1 Tab by mouth daily. 30 Tab 5    cloNIDine HCL (CATAPRES) 0.1 mg tablet Take 1 Tab by mouth daily as needed (take for BP greater than 160/90). 30 Tab 0    famotidine (PEPCID) 20 mg tablet Take 1 Tab by mouth two (2) times a day. 60 Tab 5    OTHER Indications: Tynelol 500 mg      dilTIAZem CD (CARDIZEM CD) 120 mg ER capsule Take 1 Cap by mouth daily. 30 Cap 5    magnesium oxide (MAG-OX) 400 mg tablet TAKE 1 TAB BY MOUTH TWO (2) TIMES A DAY.  120 Tab 3    meclizine (ANTIVERT) 25 mg tablet Take 1 Tab by mouth three (3) times daily as needed for Dizziness. 90 Tab 2    aspirin 81 mg chewable tablet Take 1 Tab by mouth daily. 30 Tab 0    atorvastatin (LIPITOR) 80 mg tablet Take 1 Tab by mouth nightly. 90 Tab 3    docusate sodium (STOOL SOFTENER) 100 mg tab Take  by mouth.  OTHER Organic Smooth Move for constipation      fluticasone propionate (FLONASE) 50 mcg/actuation nasal spray 2 Sprays by Both Nostrils route daily. 1 Bottle 5    triamcinolone acetonide (KENALOG) 0.025 % topical cream Apply  to affected area two (2) times a day. use thin layer      betamethasone dipropionate (DIPROLENE) 0.05 % ointment Apply  to affected area two (2) times a day.  fexofenadine (ALLEGRA) 180 mg tablet Take 180 mg by mouth daily as needed for Allergies.  hydrocortisone (HYTONE) 2.5 % topical cream Apply  to affected area two (2) times a day. use thin layer 30 g 0    cholecalciferol, vitamin D3, (VITAMIN D3) 2,000 unit tab Take  by mouth.  TURMERIC PO Take  by mouth.  folic acid-vit H5-[facility] Z87 2.2-25-1 mg tab Take  by mouth.  nystatin-triamcinolone (MYCOLOG II) topical cream Apply  to affected area two (2) times daily as needed for Skin Irritation. 30 g 1     No current facility-administered medications on file prior to encounter. Date Last Reviewed: 6/8/20  Social History/Home Situation:       Work/Activity History: Retired     OBJECTIVE:  Objective Measure: Tool Used: National Outcomes Measurement System: Functional Communication Measures: ATTENTION  Score:  Initial: 4 Most Recent: X (Date: -- )   Interpretation of Tool:  This measure describes the change in functional communication status subsequent to speech-language pathology treatment of patients who have attention deficits.   o Level 1:  Attention is nonfunctional. The individual is generally unresponsive to most stimuli.  o Level 2: The individual can briefly attend with consistent maximal stimulation, but not long enough to complete even simple living tasks.  o Level 3: The individual maintains attention over time to complete simple living tasks of short duration with consistent maximal cueing in the absence of distracting stimuli.  o Level 4: The individual maintains attention during simple living tasks of multiple steps and long duration within a minimally distracting environment with consistent minimal cueing. o Level 5: The individual maintains attention within simple living activities with occasional minimal cues within distracting environments. The individual requires increased cueing to start, continue, and change attention during complex activities. o Level 6: The individual maintains attention within complex activities, and can attend simultaneously to multiple demands with rare minimal cues. The individual usually uses compensatory strategies when encountering difficulty. The individual has mild difficulty or takes more than a reasonable amount of time to attend to multiple tasks/stimuli  o Level 7: The individuals ability to participate in vocational, a vocational or social activities is not limited by attentional abilities. Independent functioning may occasionally include the use of compensatory strategies. Score Level 7 Level 6 Level 5 Level 4 Level 3 Level 2 Level 1   Modifier CH CI CJ CK CL CM CN       Tool Used: National Outcomes Measurement System: Functional Communication Measures: MEMORY  Score:  Initial: 4 Most Recent: X (Date: -- )   Interpretation of Tool: This measure describes the change in functional communication status subsequent to speech-language pathology treatment of patients who have memory deficits. o Level 1:  The individual is unable to recall any information, regardless of cueing. o Level 2: The individual consistently requires maximal verbal cues or uses external aids to recall personal information (e.g., family members, biographical information, physical location, etc.) in structured environments.   o Level 3: The individual usually requires maximum cues to recall or use external aids for simple routine and personal information (e.g., schedule, names of familiar staff, location of therapy areas, etc.) in structured environments. o Level 4: The individual occasionally requires minimal cues to recall or use external memory aids for simple routine and personal information in structured environments. The individual requires consistent maximal cues to recall or use memory aids for complex and novel information (e.g., carry out multiple steps activities, accommodate schedule changes, anticipate meal times, etc.), plan and follow through on simple future events (e.g., use calendar to keep appointments, use log books to complete a single assignment/task, etc.) in structured environments. o Level 5 The individual consistently requires minimal cues to recall or use external memory aids for complex and novel information. The individual consistently requires minimal cues to plan and follow through on complex future events (e.g., menu planning and meal preparation, planning a party, etc.).  o Level 6: The individual is able to recall or use external aids/memory strategies for complex information and planning complex future events most of the time. When there is a breakdown in the use of recall/memory strategies/external memory aids, the individual occasionally requires minimal cues. These breakdowns may occasionally interfere with the individuals functioning in vocational, avocational, and social activities. o Level 7: The individual is successful and independent in recalling or using external aids/memory strategies for complex information and planning future events in all vocational, avocational, and social activities.   Score Level 7 Level 6 Level 5 Level 4 Level 3 Level 2 Level 1   Modifier CH CI CJ CK CL CM CN     Oral Motor Structure/Speech:       SPEECH-LANGUAGE COGNITIVE EVALUATION  Tests Given: [facility] RaisedDigital Assessment    Mental Status: Alert     Neuro-Linguistics:    1. Recalled recent events: with the use of his calendar at min A    2. Discussed memory strategies and with good recall of strategies. 3. Daily Orientation:   Day of the week +  Month +  Date +  Year +      4. Visual inattention task   1st trial: 0 errors  2nd trial: 0 errors  3rd trial: 0 errors    5. Completed following oral and written directions 2 step/4 components  1st trial: Mod I 90% accuracy   2nd trial: Mod I 90% accuracy   *some difficulty with differentiating shapes especially triangle and rectangle    6. Completed money management task (calculationg coin and money)   1st trial: Independently 100% accuracy. 2nd trial: Independently 100% accuracy, had one error but was able to self correct     7. Completed immediate memory recall with 4 words: Mod A with 66% accuracy with decreased attention and mild perseveration. Pragmatics: WNL's    Cognitive Skills Activities: Activities/Procedures listed utilized to improve and/or restore cognitive function as related to memory and compensatory strategies for recall. Required moderate manual and   cueing to improve improve recall of information and perform graded activities focusing on attention skills. __________________________________________________________________________________________________  History of Present Injury/Illness (Reason for Referral): CVA   Treatment Assessment:  Evaluation completed. Progression/Medical Necessity:   · Patient is expected to demonstrate progress in cognitive ability to increase independence with activities of daily living. Compliance with Program/Exercises: Will assess as treatment progresses}. Reason for Continuation of Services/Other Comments:  · Patient continues to require skilled intervention due to CVA. Recommendations/Intent for next treatment session: \"Treatment next visit will focus on goals\".     Total Treatment Duration:  Time In: 9551 Time Out: [address] FROST, Kendra Poe. Robert Cleaning

## 2020-06-10 ENCOUNTER — HOSPITAL ENCOUNTER (OUTPATIENT)
Dept: PHYSICAL THERAPY | Age: 85
Discharge: HOME | End: 2020-06-10
Attending: INTERNAL MEDICINE
Payer: MEDICARE

## 2020-06-10 PROCEDURE — 92507 TX SP LANG VOICE COMM INDIV: CPT | Performed by: SPEECH-LANGUAGE PATHOLOGIST

## 2020-06-10 NOTE — PROGRESS NOTES
Suzy Rudd  : 1935  [place]  Secondary: [address] at [place] [id number] [facility], [address] [place] [address],[address], [address] [facility]  [phone]   [phone]         OUTPATIENT SPEECH LANGUAGE PATHOLOGY: Re-evaluation    ICD-10: Treatment Diagnosis: Cognitive Communication deficit R41.841   REFERRING PHYSICIAN: Jere Carrillo MD MD Orders: Evaluate and Treat  Return Physician Appointment: Unknown  PAST MEDICAL HISTORY:   Mr. Brenda Ball is a 81 y.o. male who  has a past medical history of Abdominal pain (2016), Acute prostatitis, Backache, unspecified, Calculus of kidney, Cancer ([facility] [address] Diabetes ([facility] [address] Diverticulosis (14), Edema (2016), Elevated prostate specific antigen (PSA), Essential hypertension, benign, GERD (gastroesophageal reflux disease), H. pylori infection (14), History of barium enema (3/18/14), History of BPH, Hypertension, Hypertrophy of prostate without urinary obstruction and other lower urinary tract symptoms (LUTS), Impotence of organic origin, Lumbago, Malignant neoplasm of prostate ([address] Microscopic hematuria, Multiple renal cysts (14), Osteopenia determined by x-ray, Other ill-defined conditions(799.89), PVCs (premature ventricular contractions) (2016), and SSS (sick sinus syndrome) ([facility] [address] (2016). He also  has a past surgical history that includes hx other surgical; hx hernia repair; hx radical prostatectomy; hx urological; hx tonsil and adenoidectomy; hx cholecystectomy; hx colonoscopy (); and hx endoscopy (2017). MEDICAL/REFERRING DIAGNOSIS: speech  DATE OF ONSET: 2019  PRIOR LEVEL OF FUNCTION: Independent ADL's  PRECAUTIONS/ALLERGIES: Seasonal allergies   ASSESSMENT:  Patient is an 81year old male who was referred for speech evaluation due to CVA. He suffered a CVA on 2019. He was admitted to [facility] and only stayed just two days and was discharged home.   Patient reports difficulty with memory recall. He does have some difficulty with names as well. He reports that he doesn't have issues with long term recall just short term. He reports that he was having some difficulty with his memory prior to his CVA. Wife reports that he's having some difficulty with reading, writing, and balancing checkbook. Patient used to read a lot and now he's not reading as much. He also endorses some difficulty with adapting to change especially if things change with how he checks his finances online etc.  He was independent with ADL's prior to his CVA. Patient is present this date. He has no complaints. Re-evaluation due this date. Patient has been seen for speech and language therapy s/p CVA. Patient has attended 6 treatment sessions with a break in sessions due to Covid 19. Patient was re-assessed using the [employer]. Patient made noted improvements in all areas. He improved his immediate memory recall from 50% to 63%, recent memory from 75% to 84%, temporal orientation(recent memory) from 50% to 75%, problem solving/reasoning from 37% to 50%, organization from 75% to 91% and auditory processing and retention from 50% to 63%. Overall he continues with mild deficits in recent memory. Moderate deficits noted in immediate memory, problem solving/reasoning and auditory processing and retention. He's made progress however he would continue to benefit from skilled treatment to address his memory, problem solving and reasoning and auditory processing/retention for higher level ADL's. He also presents with moderate attention deficits that negatively impacts his memory and processing. I do question if there's some mild comprehension issues as well. Nonetheless is agreeable to continue with therapy at two times week. Patient will benefit from skilled intervention to address the above impairments. ?????? ? ? This section established at most recent assessment??????????  PROBLEM LIST (Impairments causing functional limitations):  1. Cognitive decline  GOALS: (Goals have been discussed and agreed upon with patient.)  SHORT-TERM FUNCTIONAL GOALS: Time Frame: 12  1. The patient will complete a  (sustained, selective, alternating , divided) attention task with  no more than 3 number of redirections when given min  (verbal, visual, tactile) cues with 80%  Accuracy. Ongoing 6/10/20  2. The patient will demonstrate adequate attention to therapy tasks with no more than   two redirections in a 45-60 min session when given min cues with 80% accuracy. 3. The patient will demonstrate the ability to follow multi-step directions related to functional living  environment with 80%verbal, visual and tactile cues in order to increase functional integration  into environment. Ongoing 6/10/2020  4. The patient will demonstrate recall of functional information following a/an (immediate, shortterm,  long-term) delay with 75% accuracy with mod cues in order to increase functional integration into environment. Ongoing 6/10/2020  5. The patient will complete a mental manipulation task with 80% accuracy and min cueing in order  to increase functional integration into environment. Ongoing 6/10/2020  6. The patient will complete executive functioning skills such as money and medication management at Mod I 90% accuracy. Ongoing 6/10/2020  DISCHARGE GOALS: Time Frame: 3 months  Client will develop functional, cognitive-linguistic-based skills and utilize compensatory  strategies to communicate wants and needs effectively, maintain safety during ADLs and  participate socially in functional living environment  REHABILITATION POTENTIAL FOR STATED GOALS: [address]:  INTERVENTIONS PLANNED: (Benefits and precautions of therapy have been discussed with the patient.)  1. Speech therapy  TREATMENT PLAN EFFECTIVE DATES: 6/10/2020 TO 9/10/2020 (90 days).   FREQUENCY/DURATION: Continue to follow patient 2 times a week for 90 days to address above goals. Regarding Ricky Lung Savanna's therapy, I certify that the treatment plan above will be carried out by a therapist or under their direction. Thank you for this referral,  DELVIN Lopez Ed CCC-SLP                  Referring Physician Signature: Jere Carrillo MD     Date      SUBJECTIVE:  Alert  Present Symptoms: Cognitive deficits  Pain Intensity 1: 0  Current Medications:   Current Outpatient Medications on File Prior to Encounter   Medication Sig Dispense Refill    valsartan (Diovan) 80 mg tablet Take 1 Tab by mouth daily. 30 Tab 5    cloNIDine HCL (CATAPRES) 0.1 mg tablet Take 1 Tab by mouth daily as needed (take for BP greater than 160/90). 30 Tab 0    famotidine (PEPCID) 20 mg tablet Take 1 Tab by mouth two (2) times a day. 60 Tab 5    OTHER Indications: Tynelol 500 mg      dilTIAZem CD (CARDIZEM CD) 120 mg ER capsule Take 1 Cap by mouth daily. 30 Cap 5    magnesium oxide (MAG-OX) 400 mg tablet TAKE 1 TAB BY MOUTH TWO (2) TIMES A DAY. 120 Tab 3    meclizine (ANTIVERT) 25 mg tablet Take 1 Tab by mouth three (3) times daily as needed for Dizziness. 90 Tab 2    aspirin 81 mg chewable tablet Take 1 Tab by mouth daily. 30 Tab 0    atorvastatin (LIPITOR) 80 mg tablet Take 1 Tab by mouth nightly. 90 Tab 3    docusate sodium (STOOL SOFTENER) 100 mg tab Take  by mouth.  OTHER Organic Smooth Move for constipation      fluticasone propionate (FLONASE) 50 mcg/actuation nasal spray 2 Sprays by Both Nostrils route daily. 1 Bottle 5    triamcinolone acetonide (KENALOG) 0.025 % topical cream Apply  to affected area two (2) times a day. use thin layer      betamethasone dipropionate (DIPROLENE) 0.05 % ointment Apply  to affected area two (2) times a day.  fexofenadine (ALLEGRA) 180 mg tablet Take 180 mg by mouth daily as needed for Allergies.  hydrocortisone (HYTONE) 2.5 % topical cream Apply  to affected area two (2) times a day.  use thin layer 30 g 0    cholecalciferol, vitamin D3, (VITAMIN D3) 2,000 unit tab Take  by mouth.  TURMERIC PO Take  by mouth.  folic acid-vit A1-[facility] M01 2.2-25-1 mg tab Take  by mouth.  nystatin-triamcinolone (MYCOLOG II) topical cream Apply  to affected area two (2) times daily as needed for Skin Irritation. 30 g 1     No current facility-administered medications on file prior to encounter. Date Last Reviewed: 6/10/20  Social History/Home Situation:       Work/Activity History: Retired     OBJECTIVE:  Objective Measure: Tool Used: National Outcomes Measurement System: Functional Communication Measures: ATTENTION  Score:  Initial: 4 Most Recent: X (Date: -- )   Interpretation of Tool:  This measure describes the change in functional communication status subsequent to speech-language pathology treatment of patients who have attention deficits. o Level 1:  Attention is nonfunctional. The individual is generally unresponsive to most stimuli.  o Level 2: The individual can briefly attend with consistent maximal stimulation, but not long enough to complete even simple living tasks. o Level 3: The individual maintains attention over time to complete simple living tasks of short duration with consistent maximal cueing in the absence of distracting stimuli.  o Level 4: The individual maintains attention during simple living tasks of multiple steps and long duration within a minimally distracting environment with consistent minimal cueing. o Level 5: The individual maintains attention within simple living activities with occasional minimal cues within distracting environments. The individual requires increased cueing to start, continue, and change attention during complex activities. o Level 6: The individual maintains attention within complex activities, and can attend simultaneously to multiple demands with rare minimal cues.  The individual usually uses compensatory strategies when encountering difficulty. The individual has mild difficulty or takes more than a reasonable amount of time to attend to multiple tasks/stimuli  o Level 7: The individuals ability to participate in vocational, a vocational or social activities is not limited by attentional abilities. Independent functioning may occasionally include the use of compensatory strategies. Score Level 7 Level 6 Level 5 Level 4 Level 3 Level 2 Level 1   Modifier CH CI CJ CK CL CM CN       Tool Used: National Outcomes Measurement System: Functional Communication Measures: MEMORY  Score:  Initial: 4 Most Recent: X (Date: -- )   Interpretation of Tool: This measure describes the change in functional communication status subsequent to speech-language pathology treatment of patients who have memory deficits. o Level 1:  The individual is unable to recall any information, regardless of cueing. o Level 2: The individual consistently requires maximal verbal cues or uses external aids to recall personal information (e.g., family members, biographical information, physical location, etc.) in structured environments. o Level 3: The individual usually requires maximum cues to recall or use external aids for simple routine and personal information (e.g., schedule, names of familiar staff, location of therapy areas, etc.) in structured environments. o Level 4: The individual occasionally requires minimal cues to recall or use external memory aids for simple routine and personal information in structured environments. The individual requires consistent maximal cues to recall or use memory aids for complex and novel information (e.g., carry out multiple steps activities, accommodate schedule changes, anticipate meal times, etc.), plan and follow through on simple future events (e.g., use calendar to keep appointments, use log books to complete a single assignment/task, etc.) in structured environments.   o Level 5 The individual consistently requires minimal cues to recall or use external memory aids for complex and novel information. The individual consistently requires minimal cues to plan and follow through on complex future events (e.g., menu planning and meal preparation, planning a party, etc.).  o Level 6: The individual is able to recall or use external aids/memory strategies for complex information and planning complex future events most of the time. When there is a breakdown in the use of recall/memory strategies/external memory aids, the individual occasionally requires minimal cues. These breakdowns may occasionally interfere with the individuals functioning in vocational, avocational, and social activities. o Level 7: The individual is successful and independent in recalling or using external aids/memory strategies for complex information and planning future events in all vocational, avocational, and social activities. Score Level 7 Level 6 Level 5 Level 4 Level 3 Level 2 Level 1   Modifier CH CI CJ CK CL CM CN     Oral Motor Structure/Speech:       SPEECH-LANGUAGE COGNITIVE EVALUATION  Tests Given: Ross Information Processing Assessment    Mental Status: Alert     Neuro-Linguistics: Re-Evaluation   Immediate memory: 63%tile  Recent Memory\" 84%tile  Temporal Orientation(recent memory) 75%tile  Problem Solving/Reasonign: 50%tile  Organization: 91%tile  Auditory Processing/Retention: 63%tile      Pragmatics: WNL's    Cognitive Skills Activities: Activities/Procedures listed utilized to improve and/or restore cognitive function as related to memory and compensatory strategies for recall. Required moderate manual and   cueing to improve improve recall of information and perform graded activities focusing on attention skills. __________________________________________________________________________________________________  History of Present Injury/Illness (Reason for Referral): CVA   Treatment Assessment:  Evaluation completed.   Progression/Medical Necessity:   · Patient is expected to demonstrate progress in cognitive ability to increase independence with activities of daily living. Compliance with Program/Exercises: Will assess as treatment progresses}. Reason for Continuation of Services/Other Comments:  · Patient continues to require skilled intervention due to CVA. Recommendations/Intent for next treatment session: \"Treatment next visit will focus on goals\". Total Treatment Duration:  Time In: 1015   Time Out: [address]. Lori Rincon

## 2020-06-15 ENCOUNTER — HOSPITAL ENCOUNTER (OUTPATIENT)
Dept: PHYSICAL THERAPY | Age: 85
Discharge: HOME | End: 2020-06-15
Attending: INTERNAL MEDICINE
Payer: MEDICARE

## 2020-06-15 PROCEDURE — 92507 TX SP LANG VOICE COMM INDIV: CPT | Performed by: SPEECH-LANGUAGE PATHOLOGIST

## 2020-06-15 NOTE — PROGRESS NOTES
Amadeo Christine  : 1935  [place]  Secondary: [address] at [facility]  [address], [address], [facility] [address]  [phone]   [phone]         OUTPATIENT SPEECH LANGUAGE PATHOLOGY: Daily Note    ICD-10: Treatment Diagnosis: Cognitive Communication deficit R41.841   REFERRING PHYSICIAN: Kira Brandon MD MD Orders: Evaluate and Treat  Return Physician Appointment: Unknown  PAST MEDICAL HISTORY:   Mr. Robert Meléndez is a 81 y.o. male who  has a past medical history of Abdominal pain (2016), Acute prostatitis, Backache, unspecified, Calculus of kidney, Cancer ([facility] [address] Diabetes ([facility] [address] Diverticulosis (14), Edema (2016), Elevated prostate specific antigen (PSA), Essential hypertension, benign, GERD (gastroesophageal reflux disease), H. pylori infection (14), History of barium enema (3/18/14), History of BPH, Hypertension, Hypertrophy of prostate without urinary obstruction and other lower urinary tract symptoms (LUTS), Impotence of organic origin, Lumbago, Malignant neoplasm of prostate ([address] Microscopic hematuria, Multiple renal cysts (14), Osteopenia determined by x-ray, Other ill-defined conditions(799.89), PVCs (premature ventricular contractions) (2016), and SSS (sick sinus syndrome) ([facility] [address] (2016). He also  has a past surgical history that includes hx other surgical; hx hernia repair; hx radical prostatectomy; hx urological; hx tonsil and adenoidectomy; hx cholecystectomy; hx colonoscopy (); and hx endoscopy (2017). MEDICAL/REFERRING DIAGNOSIS: speech  DATE OF ONSET: 2019  PRIOR LEVEL OF FUNCTION: Independent ADL's  PRECAUTIONS/ALLERGIES: Seasonal allergies   ASSESSMENT:  Patient is an 81year old male who was referred for speech evaluation due to CVA. He suffered a CVA on 2019. He was admitted to [facility] and only stayed just two days and was discharged home.   Patient reports difficulty with memory recall. He does have some difficulty with names as well. He reports that he doesn't have issues with long term recall just short term. He reports that he was having some difficulty with his memory prior to his CVA. Wife reports that he's having some difficulty with reading, writing, and balancing checkbook. Patient used to read a lot and now he's not reading as much. He also endorses some difficulty with adapting to change especially if things change with how he checks his finances online etc.  He was independent with ADL's prior to his CVA. Patient is present this date. He completed cognitive task with minimal assist. He is impulsive during his tasks but otherwise he is doing well. Just need to cue him to slow down. Please see below for specifics. Patient will benefit from skilled intervention to address the above impairments. ?????? ? ? This section established at most recent assessment??????????  PROBLEM LIST (Impairments causing functional limitations):  1. Cognitive decline  GOALS: (Goals have been discussed and agreed upon with patient.)  SHORT-TERM FUNCTIONAL GOALS: Time Frame: 12  1. The patient will complete a  (sustained, selective, alternating , divided) attention task with  no more than 3 number of redirections when given min  (verbal, visual, tactile) cues with 80%  Accuracy. Ongoing 6/10/20  2. The patient will demonstrate adequate attention to therapy tasks with no more than   two redirections in a 45-60 min session when given min cues with 80% accuracy. 3. The patient will demonstrate the ability to follow multi-step directions related to functional living  environment with 80%verbal, visual and tactile cues in order to increase functional integration  into environment. Ongoing 6/10/2020  4.  The patient will demonstrate recall of functional information following a/an (immediate, shortterm,  long-term) delay with 75% accuracy with mod cues in order to increase functional integration into environment. Ongoing 6/10/2020  5. The patient will complete a mental manipulation task with 80% accuracy and min cueing in order  to increase functional integration into environment. Ongoing 6/10/2020  6. The patient will complete executive functioning skills such as money and medication management at Mod I 90% accuracy. Ongoing 6/10/2020  DISCHARGE GOALS: Time Frame: 3 months  Client will develop functional, cognitive-linguistic-based skills and utilize compensatory  strategies to communicate wants and needs effectively, maintain safety during ADLs and  participate socially in functional living environment  REHABILITATION POTENTIAL FOR STATED GOALS: [address]:  INTERVENTIONS PLANNED: (Benefits and precautions of therapy have been discussed with the patient.)  1. Speech therapy  TREATMENT PLAN EFFECTIVE DATES: 6/10/2020 TO 9/10/2020 (90 days). FREQUENCY/DURATION: Continue to follow patient 2 times a week for 90 days to address above goals. Regarding Ivy Corrales's therapy, I certify that the treatment plan above will be carried out by a therapist or under their direction. Thank you for this referral,  DELVIN Randall Ed CCC-SLP                  Referring Physician Signature: Pilo Feldman MD     Date      SUBJECTIVE:  Alert  Present Symptoms: Cognitive deficits  Pain Intensity 1: 0  Current Medications:   Current Outpatient Medications on File Prior to Encounter   Medication Sig Dispense Refill    valsartan (Diovan) 80 mg tablet Take 1 Tab by mouth daily. 30 Tab 5    cloNIDine HCL (CATAPRES) 0.1 mg tablet Take 1 Tab by mouth daily as needed (take for BP greater than 160/90). 30 Tab 0    famotidine (PEPCID) 20 mg tablet Take 1 Tab by mouth two (2) times a day. 60 Tab 5    OTHER Indications: Tynelol 500 mg      dilTIAZem CD (CARDIZEM CD) 120 mg ER capsule Take 1 Cap by mouth daily.  30 Cap 5    magnesium oxide (MAG-OX) 400 mg tablet TAKE 1 TAB BY MOUTH TWO (2) TIMES A DAY. 120 Tab 3    meclizine (ANTIVERT) 25 mg tablet Take 1 Tab by mouth three (3) times daily as needed for Dizziness. 90 Tab 2    aspirin 81 mg chewable tablet Take 1 Tab by mouth daily. 30 Tab 0    atorvastatin (LIPITOR) 80 mg tablet Take 1 Tab by mouth nightly. 90 Tab 3    docusate sodium (STOOL SOFTENER) 100 mg tab Take  by mouth.  OTHER Organic Smooth Move for constipation      fluticasone propionate (FLONASE) 50 mcg/actuation nasal spray 2 Sprays by Both Nostrils route daily. 1 Bottle 5    triamcinolone acetonide (KENALOG) 0.025 % topical cream Apply  to affected area two (2) times a day. use thin layer      betamethasone dipropionate (DIPROLENE) 0.05 % ointment Apply  to affected area two (2) times a day.  fexofenadine (ALLEGRA) 180 mg tablet Take 180 mg by mouth daily as needed for Allergies.  hydrocortisone (HYTONE) 2.5 % topical cream Apply  to affected area two (2) times a day. use thin layer 30 g 0    cholecalciferol, vitamin D3, (VITAMIN D3) 2,000 unit tab Take  by mouth.  TURMERIC PO Take  by mouth.  folic acid-vit I0- Y00 2.2-25-1 mg tab Take  by mouth.  nystatin-triamcinolone (MYCOLOG II) topical cream Apply  to affected area two (2) times daily as needed for Skin Irritation. 30 g 1     No current facility-administered medications on file prior to encounter. Date Last Reviewed: 6/15/20  Social History/Home Situation:       Work/Activity History: Retired     OBJECTIVE:  Objective Measure: Tool Used: National Outcomes Measurement System: Functional Communication Measures: ATTENTION  Score:  Initial: 4 Most Recent: X (Date: -- )   Interpretation of Tool:  This measure describes the change in functional communication status subsequent to speech-language pathology treatment of patients who have attention deficits.   o Level 1:  Attention is nonfunctional. The individual is generally unresponsive to most stimuli.  o Level 2: The individual can briefly attend with consistent maximal stimulation, but not long enough to complete even simple living tasks. o Level 3: The individual maintains attention over time to complete simple living tasks of short duration with consistent maximal cueing in the absence of distracting stimuli.  o Level 4: The individual maintains attention during simple living tasks of multiple steps and long duration within a minimally distracting environment with consistent minimal cueing. o Level 5: The individual maintains attention within simple living activities with occasional minimal cues within distracting environments. The individual requires increased cueing to start, continue, and change attention during complex activities. o Level 6: The individual maintains attention within complex activities, and can attend simultaneously to multiple demands with rare minimal cues. The individual usually uses compensatory strategies when encountering difficulty. The individual has mild difficulty or takes more than a reasonable amount of time to attend to multiple tasks/stimuli  o Level 7: The individuals ability to participate in vocational, a vocational or social activities is not limited by attentional abilities. Independent functioning may occasionally include the use of compensatory strategies. Score Level 7 Level 6 Level 5 Level 4 Level 3 Level 2 Level 1   Modifier CH CI CJ CK CL CM CN       Tool Used: National Outcomes Measurement System: Functional Communication Measures: MEMORY  Score:  Initial: 4 Most Recent: X (Date: -- )   Interpretation of Tool: This measure describes the change in functional communication status subsequent to speech-language pathology treatment of patients who have memory deficits. o Level 1:  The individual is unable to recall any information, regardless of cueing.   o Level 2: The individual consistently requires maximal verbal cues or uses external aids to recall personal information (e.g., family members, biographical information, physical location, etc.) in structured environments. o Level 3: The individual usually requires maximum cues to recall or use external aids for simple routine and personal information (e.g., schedule, names of familiar staff, location of therapy areas, etc.) in structured environments. o Level 4: The individual occasionally requires minimal cues to recall or use external memory aids for simple routine and personal information in structured environments. The individual requires consistent maximal cues to recall or use memory aids for complex and novel information (e.g., carry out multiple steps activities, accommodate schedule changes, anticipate meal times, etc.), plan and follow through on simple future events (e.g., use calendar to keep appointments, use log books to complete a single assignment/task, etc.) in structured environments. o Level 5 The individual consistently requires minimal cues to recall or use external memory aids for complex and novel information. The individual consistently requires minimal cues to plan and follow through on complex future events (e.g., menu planning and meal preparation, planning a party, etc.).  o Level 6: The individual is able to recall or use external aids/memory strategies for complex information and planning complex future events most of the time. When there is a breakdown in the use of recall/memory strategies/external memory aids, the individual occasionally requires minimal cues. These breakdowns may occasionally interfere with the individuals functioning in vocational, avocational, and social activities. o Level 7: The individual is successful and independent in recalling or using external aids/memory strategies for complex information and planning future events in all vocational, avocational, and social activities.   Score Level 7 Level 6 Level 5 Level 4 Level 3 Level 2 Level 1   Modifier CH CI CJ CK CL CM CN     Oral Motor Structure/Speech:       SPEECH-LANGUAGE COGNITIVE EVALUATION  Tests Given: [employer] Information Processing Assessment    Mental Status: Alert     Neuro-Linguistics:     1. Talked about recent memory events over the weekened independently 100% accuracy     2. Orientation  Month +  Date +  Year +    3. Visual inattention task   1st trial: 0 errors  2nd trial: 0 errors  3rd trial: 0 errors  4th trial: 0 errors    4. Completed following written and oral directions 2 step 4 components  1st trial:     5. Completed money management task (determing money and coil value): Min A with 83% accuracy             Pragmatics: WNL's    Cognitive Skills Activities: Activities/Procedures listed utilized to improve and/or restore cognitive function as related to memory and compensatory strategies for recall. Required moderate manual and   cueing to improve improve recall of information and perform graded activities focusing on attention skills. __________________________________________________________________________________________________  History of Present Injury/Illness (Reason for Referral): CVA   Treatment Assessment:  Evaluation completed. Progression/Medical Necessity:   · Patient is expected to demonstrate progress in cognitive ability to increase independence with activities of daily living. Compliance with Program/Exercises: Will assess as treatment progresses}. Reason for Continuation of Services/Other Comments:  · Patient continues to require skilled intervention due to CVA. Recommendations/Intent for next treatment session: \"Treatment next visit will focus on goals\". Total Treatment Duration:  Time In: 1015   Time Out: [address] Mana Steele

## 2020-06-25 ENCOUNTER — APPOINTMENT (OUTPATIENT)
Dept: PHYSICAL THERAPY | Age: 85
End: 2020-06-25
Attending: INTERNAL MEDICINE
Payer: MEDICARE

## 2020-06-26 ENCOUNTER — APPOINTMENT (OUTPATIENT)
Dept: PHYSICAL THERAPY | Age: 85
End: 2020-06-26
Attending: INTERNAL MEDICINE
Payer: MEDICARE

## 2020-06-26 ENCOUNTER — HOSPITAL ENCOUNTER (OUTPATIENT)
Dept: PHYSICAL THERAPY | Age: 85
Discharge: HOME | End: 2020-06-26
Attending: INTERNAL MEDICINE
Payer: MEDICARE

## 2020-06-26 PROCEDURE — 92507 TX SP LANG VOICE COMM INDIV: CPT | Performed by: SPEECH-LANGUAGE PATHOLOGIST

## 2020-06-26 NOTE — PROGRESS NOTES
James George  : 1935  [place]  Secondary: [address] at [address], [address], [place] [address].  [phone]   [phone]         OUTPATIENT SPEECH LANGUAGE PATHOLOGY: Daily Note    ICD-10: Treatment Diagnosis: Cognitive Communication deficit R41.841   REFERRING PHYSICIAN: Mago Duke MD MD Orders: Evaluate and Treat  Return Physician Appointment: Unknown  PAST MEDICAL HISTORY:   Mr. Patrick Cox is a 81 y.o. male who  has a past medical history of Abdominal pain (2016), Acute prostatitis, Backache, unspecified, Calculus of kidney, Cancer ([facility] [address] Diabetes ([facility] [address] Diverticulosis (14), Edema (2016), Elevated prostate specific antigen (PSA), Essential hypertension, benign, GERD (gastroesophageal reflux disease), H. pylori infection (14), History of barium enema (3/18/14), History of BPH, Hypertension, Hypertrophy of prostate without urinary obstruction and other lower urinary tract symptoms (LUTS), Impotence of organic origin, Lumbago, Malignant neoplasm of prostate ([address] Microscopic hematuria, Multiple renal cysts (14), Osteopenia determined by x-ray, Other ill-defined conditions(799.89), PVCs (premature ventricular contractions) (2016), and SSS (sick sinus syndrome) ([facility] [address] (2016). He also  has a past surgical history that includes hx other surgical; hx hernia repair; hx radical prostatectomy; hx urological; hx tonsil and adenoidectomy; hx cholecystectomy; hx colonoscopy (); and hx endoscopy (2017). MEDICAL/REFERRING DIAGNOSIS: speech  DATE OF ONSET: 2019  PRIOR LEVEL OF FUNCTION: Independent ADL's  PRECAUTIONS/ALLERGIES: Seasonal allergies   ASSESSMENT:  Patient is an 81year old male who was referred for speech evaluation due to CVA. He suffered a CVA on 2019. He was admitted to [facility] and only stayed just two days and was discharged home.   Patient reports difficulty with memory recall. He does have some difficulty with names as well. He reports that he doesn't have issues with long term recall just short term. He reports that he was having some difficulty with his memory prior to his CVA. Wife reports that he's having some difficulty with reading, writing, and balancing checkbook. Patient used to read a lot and now he's not reading as much. He also endorses some difficulty with adapting to change especially if things change with how he checks his finances online etc.  He was independent with ADL's prior to his CVA. Patient is present this date. He completed cognitive task with minimal assist. He is impulsive during his tasks but otherwise he is doing well. Just need to cue him to slow down. Please see below for specifics. Patient will benefit from skilled intervention to address the above impairments. ?????? ? ? This section established at most recent assessment??????????  PROBLEM LIST (Impairments causing functional limitations):  1. Cognitive decline  GOALS: (Goals have been discussed and agreed upon with patient.)  SHORT-TERM FUNCTIONAL GOALS: Time Frame: 12  1. The patient will complete a  (sustained, selective, alternating , divided) attention task with  no more than 3 number of redirections when given min  (verbal, visual, tactile) cues with 80%  Accuracy. Ongoing 6/10/20  2. The patient will demonstrate adequate attention to therapy tasks with no more than   two redirections in a 45-60 min session when given min cues with 80% accuracy. 3. The patient will demonstrate the ability to follow multi-step directions related to functional living  environment with 80%verbal, visual and tactile cues in order to increase functional integration  into environment. Ongoing 6/10/2020  4.  The patient will demonstrate recall of functional information following a/an (immediate, shortterm,  long-term) delay with 75% accuracy with mod cues in order to increase functional integration into environment. Ongoing 6/10/2020  5. The patient will complete a mental manipulation task with 80% accuracy and min cueing in order  to increase functional integration into environment. Ongoing 6/10/2020  6. The patient will complete executive functioning skills such as money and medication management at Mod I 90% accuracy. Ongoing 6/10/2020  DISCHARGE GOALS: Time Frame: 3 months  Client will develop functional, cognitive-linguistic-based skills and utilize compensatory  strategies to communicate wants and needs effectively, maintain safety during ADLs and  participate socially in functional living environment  REHABILITATION POTENTIAL FOR STATED GOALS: [address]:  INTERVENTIONS PLANNED: (Benefits and precautions of therapy have been discussed with the patient.)  1. Speech therapy  TREATMENT PLAN EFFECTIVE DATES: 6/10/2020 TO 9/10/2020 (90 days). FREQUENCY/DURATION: Continue to follow patient 2 times a week for 90 days to address above goals. Regarding Susan Corrales's therapy, I certify that the treatment plan above will be carried out by a therapist or under their direction. Thank you for this referral,  DELVIN Piña Ed CCC-SLP                  Referring Physician Signature: Abel Macario MD     Date      SUBJECTIVE:  Alert  Present Symptoms: Cognitive deficits     Current Medications:   Current Outpatient Medications on File Prior to Encounter   Medication Sig Dispense Refill    valsartan (Diovan) 80 mg tablet Take 1 Tab by mouth daily. 30 Tab 5    cloNIDine HCL (CATAPRES) 0.1 mg tablet Take 1 Tab by mouth daily as needed (take for BP greater than 160/90). 30 Tab 0    famotidine (PEPCID) 20 mg tablet Take 1 Tab by mouth two (2) times a day. 60 Tab 5    OTHER Indications: Tynelol 500 mg      dilTIAZem CD (CARDIZEM CD) 120 mg ER capsule Take 1 Cap by mouth daily. 30 Cap 5    magnesium oxide (MAG-OX) 400 mg tablet TAKE 1 TAB BY MOUTH TWO (2) TIMES A DAY.  [address] Tab 3    meclizine (ANTIVERT) 25 mg tablet Take 1 Tab by mouth three (3) times daily as needed for Dizziness. 90 Tab 2    aspirin 81 mg chewable tablet Take 1 Tab by mouth daily. 30 Tab 0    atorvastatin (LIPITOR) 80 mg tablet Take 1 Tab by mouth nightly. 90 Tab 3    docusate sodium (STOOL SOFTENER) 100 mg tab Take  by mouth.  OTHER Organic Smooth Move for constipation      fluticasone propionate (FLONASE) 50 mcg/actuation nasal spray 2 Sprays by Both Nostrils route daily. 1 Bottle 5    triamcinolone acetonide (KENALOG) 0.025 % topical cream Apply  to affected area two (2) times a day. use thin layer      betamethasone dipropionate (DIPROLENE) 0.05 % ointment Apply  to affected area two (2) times a day.  fexofenadine (ALLEGRA) 180 mg tablet Take 180 mg by mouth daily as needed for Allergies.  hydrocortisone (HYTONE) 2.5 % topical cream Apply  to affected area two (2) times a day. use thin layer 30 g 0    cholecalciferol, vitamin D3, (VITAMIN D3) 2,000 unit tab Take  by mouth.  TURMERIC PO Take  by mouth.  folic acid-vit M2-HMK T44 2.2-25-1 mg tab Take  by mouth.  nystatin-triamcinolone (MYCOLOG II) topical cream Apply  to affected area two (2) times daily as needed for Skin Irritation. 30 g 1     No current facility-administered medications on file prior to encounter. Date Last Reviewed: 6/26/2020  Social History/Home Situation:       Work/Activity History: Retired     OBJECTIVE:  Objective Measure: Tool Used: National Outcomes Measurement System: Functional Communication Measures: ATTENTION  Score:  Initial: 4 Most Recent: X (Date: -- )   Interpretation of Tool:  This measure describes the change in functional communication status subsequent to speech-language pathology treatment of patients who have attention deficits.   o Level 1:  Attention is nonfunctional. The individual is generally unresponsive to most stimuli.  o Level 2: The individual can briefly attend with consistent maximal stimulation, but not long enough to complete even simple living tasks. o Level 3: The individual maintains attention over time to complete simple living tasks of short duration with consistent maximal cueing in the absence of distracting stimuli.  o Level 4: The individual maintains attention during simple living tasks of multiple steps and long duration within a minimally distracting environment with consistent minimal cueing. o Level 5: The individual maintains attention within simple living activities with occasional minimal cues within distracting environments. The individual requires increased cueing to start, continue, and change attention during complex activities. o Level 6: The individual maintains attention within complex activities, and can attend simultaneously to multiple demands with rare minimal cues. The individual usually uses compensatory strategies when encountering difficulty. The individual has mild difficulty or takes more than a reasonable amount of time to attend to multiple tasks/stimuli  o Level 7: The individuals ability to participate in vocational, a vocational or social activities is not limited by attentional abilities. Independent functioning may occasionally include the use of compensatory strategies. Score Level 7 Level 6 Level 5 Level 4 Level 3 Level 2 Level 1   Modifier CH CI CJ CK CL CM CN       Tool Used: National Outcomes Measurement System: Functional Communication Measures: MEMORY  Score:  Initial: 4 Most Recent: X (Date: -- )   Interpretation of Tool: This measure describes the change in functional communication status subsequent to speech-language pathology treatment of patients who have memory deficits. o Level 1:  The individual is unable to recall any information, regardless of cueing.   o Level 2: The individual consistently requires maximal verbal cues or uses external aids to recall personal information (e.g., family members, biographical information, physical location, etc.) in structured environments. o Level 3: The individual usually requires maximum cues to recall or use external aids for simple routine and personal information (e.g., schedule, names of familiar staff, location of therapy areas, etc.) in structured environments. o Level 4: The individual occasionally requires minimal cues to recall or use external memory aids for simple routine and personal information in structured environments. The individual requires consistent maximal cues to recall or use memory aids for complex and novel information (e.g., carry out multiple steps activities, accommodate schedule changes, anticipate meal times, etc.), plan and follow through on simple future events (e.g., use calendar to keep appointments, use log books to complete a single assignment/task, etc.) in structured environments. o Level 5 The individual consistently requires minimal cues to recall or use external memory aids for complex and novel information. The individual consistently requires minimal cues to plan and follow through on complex future events (e.g., menu planning and meal preparation, planning a party, etc.).  o Level 6: The individual is able to recall or use external aids/memory strategies for complex information and planning complex future events most of the time. When there is a breakdown in the use of recall/memory strategies/external memory aids, the individual occasionally requires minimal cues. These breakdowns may occasionally interfere with the individuals functioning in vocational, avocational, and social activities. o Level 7: The individual is successful and independent in recalling or using external aids/memory strategies for complex information and planning future events in all vocational, avocational, and social activities.   Score Level 7 Level 6 Level 5 Level 4 Level 3 Level 2 Level 1   Modifier CH CI CJ CK CL CM CN     Oral Motor Structure/Speech: SPEECH-LANGUAGE COGNITIVE EVALUATION  Tests Given: [facility] Information Processing Assessment    Mental Status: Alert     Neuro-Linguistics:     1. Talked about recent memory events over the weekened independently 100% accuracy     2. Orientation  Month +  Date +  Year +    3. Visual inattention task   1st trial: 0 errors  2nd trial: 0 errors  3rd trial: 0 errors  4th trial: 0 errors    4. Completed following written and oral directions 2 step 4 components  1st trial: Max A with 30% accuracy due to decreased attention and concentration which impacts his recall. Pragmatics: WNL's    Cognitive Skills Activities: Activities/Procedures listed utilized to improve and/or restore cognitive function as related to memory and compensatory strategies for recall. Required moderate manual and   cueing to improve improve recall of information and perform graded activities focusing on attention skills. __________________________________________________________________________________________________  History of Present Injury/Illness (Reason for Referral): CVA   Treatment Assessment:  Evaluation completed. Progression/Medical Necessity:   · Patient is expected to demonstrate progress in cognitive ability to increase independence with activities of daily living. Compliance with Program/Exercises: Will assess as treatment progresses}. Reason for Continuation of Services/Other Comments:  · Patient continues to require skilled intervention due to CVA. Recommendations/Intent for next treatment session: \"Treatment next visit will focus on goals\". Total Treatment Duration:    Time In: 1430  Time Out: [address], [place]. Annemarie Vega

## 2020-07-08 ENCOUNTER — HOSPITAL ENCOUNTER (OUTPATIENT)
Dept: PHYSICAL THERAPY | Age: 85
Discharge: HOME | End: 2020-07-08
Attending: INTERNAL MEDICINE
Payer: MEDICARE

## 2020-07-08 PROCEDURE — 92507 TX SP LANG VOICE COMM INDIV: CPT | Performed by: SPEECH-LANGUAGE PATHOLOGIST

## 2020-07-08 NOTE — PROGRESS NOTES
Isidro Sweeney  : 1935  [place]  Secondary: [address] at [facility]  [address] [facility], [address], [address] [facility]  [phone]   [phone]         OUTPATIENT SPEECH LANGUAGE PATHOLOGY: Daily Note    ICD-10: Treatment Diagnosis: Cognitive Communication deficit R41.841   REFERRING PHYSICIAN: Carmencita Vasquez MD MD Orders: Evaluate and Treat  Return Physician Appointment: Unknown  PAST MEDICAL HISTORY:   Mr. Vannesa Burrows is a 81 y.o. male who  has a past medical history of Abdominal pain (2016), Acute prostatitis, Backache, unspecified, Calculus of kidney, Cancer ([facility] [address] Diabetes ([facility] [address] Diverticulosis (14), Edema (2016), Elevated prostate specific antigen (PSA), Essential hypertension, benign, GERD (gastroesophageal reflux disease), H. pylori infection (14), History of barium enema (3/18/14), History of BPH, Hypertension, Hypertrophy of prostate without urinary obstruction and other lower urinary tract symptoms (LUTS), Impotence of organic origin, Lumbago, Malignant neoplasm of prostate ([address] Microscopic hematuria, Multiple renal cysts (14), Osteopenia determined by x-ray, Other ill-defined conditions(799.89), PVCs (premature ventricular contractions) (2016), and SSS (sick sinus syndrome) ([facility] [address] (2016). He also  has a past surgical history that includes hx other surgical; hx hernia repair; hx radical prostatectomy; hx urological; hx tonsil and adenoidectomy; hx cholecystectomy; hx colonoscopy (); and hx endoscopy (2017). MEDICAL/REFERRING DIAGNOSIS: speech  DATE OF ONSET: 2019  PRIOR LEVEL OF FUNCTION: Independent ADL's  PRECAUTIONS/ALLERGIES: Seasonal allergies   ASSESSMENT:  Patient is an 81year old male who was referred for speech evaluation due to CVA. He suffered a CVA on 2019. He was admitted to [facility] and only stayed just two days and was discharged home.   Patient reports difficulty with memory recall. He does have some difficulty with names as well. He reports that he doesn't have issues with long term recall just short term. He reports that he was having some difficulty with his memory prior to his CVA. Wife reports that he's having some difficulty with reading, writing, and balancing checkbook. Patient used to read a lot and now he's not reading as much. He also endorses some difficulty with adapting to change especially if things change with how he checks his finances online etc.  He was independent with ADL's prior to his CVA. Patient is present this date. He completed cognitive task with minimal assist. He is impulsive during his tasks but otherwise he is doing well. Just need to cue him to slow down. Please see below for specifics. Patient will benefit from skilled intervention to address the above impairments. ?????? ? ? This section established at most recent assessment??????????  PROBLEM LIST (Impairments causing functional limitations):  1. Cognitive decline  GOALS: (Goals have been discussed and agreed upon with patient.)  SHORT-TERM FUNCTIONAL GOALS: Time Frame: 12  1. The patient will complete a  (sustained, selective, alternating , divided) attention task with  no more than 3 number of redirections when given min  (verbal, visual, tactile) cues with 80%  Accuracy. Ongoing 6/10/20  2. The patient will demonstrate adequate attention to therapy tasks with no more than   two redirections in a 45-60 min session when given min cues with 80% accuracy. 3. The patient will demonstrate the ability to follow multi-step directions related to functional living  environment with 80%verbal, visual and tactile cues in order to increase functional integration  into environment. Ongoing 6/10/2020  4.  The patient will demonstrate recall of functional information following a/an (immediate, shortterm,  long-term) delay with 75% accuracy with mod cues in order to increase functional integration into environment. Ongoing 6/10/2020  5. The patient will complete a mental manipulation task with 80% accuracy and min cueing in order  to increase functional integration into environment. Ongoing 6/10/2020  6. The patient will complete executive functioning skills such as money and medication management at Mod I 90% accuracy. Ongoing 6/10/2020  DISCHARGE GOALS: Time Frame: 3 months  Client will develop functional, cognitive-linguistic-based skills and utilize compensatory  strategies to communicate wants and needs effectively, maintain safety during ADLs and  participate socially in functional living environment  REHABILITATION POTENTIAL FOR STATED GOALS: [address]:  INTERVENTIONS PLANNED: (Benefits and precautions of therapy have been discussed with the patient.)  1. Speech therapy  TREATMENT PLAN EFFECTIVE DATES: 6/10/2020 TO 9/10/2020 (90 days). FREQUENCY/DURATION: Continue to follow patient 2 times a week for 90 days to address above goals. Regarding Reed Corrales's therapy, I certify that the treatment plan above will be carried out by a therapist or under their direction. Thank you for this referral,  DELVIN Fatima Ed CCC-SLP                  Referring Physician Signature: Julianna Ramos MD     Date      SUBJECTIVE:  Alert  Present Symptoms: Cognitive deficits     Current Medications:   Current Outpatient Medications on File Prior to Encounter   Medication Sig Dispense Refill    valsartan (Diovan) 80 mg tablet Take 1 Tab by mouth daily. 30 Tab 5    cloNIDine HCL (CATAPRES) 0.1 mg tablet Take 1 Tab by mouth daily as needed (take for BP greater than 160/90). 30 Tab 0    famotidine (PEPCID) 20 mg tablet Take 1 Tab by mouth two (2) times a day. 60 Tab 5    OTHER Indications: Tynelol 500 mg      dilTIAZem CD (CARDIZEM CD) 120 mg ER capsule Take 1 Cap by mouth daily. 30 Cap 5    magnesium oxide (MAG-OX) 400 mg tablet TAKE 1 TAB BY MOUTH TWO (2) TIMES A DAY.  [address] Tab 3    meclizine (ANTIVERT) 25 mg tablet Take 1 Tab by mouth three (3) times daily as needed for Dizziness. 90 Tab 2    aspirin 81 mg chewable tablet Take 1 Tab by mouth daily. 30 Tab 0    atorvastatin (LIPITOR) 80 mg tablet Take 1 Tab by mouth nightly. 90 Tab 3    docusate sodium (STOOL SOFTENER) 100 mg tab Take  by mouth.  OTHER Organic Smooth Move for constipation      fluticasone propionate (FLONASE) 50 mcg/actuation nasal spray 2 Sprays by Both Nostrils route daily. 1 Bottle 5    triamcinolone acetonide (KENALOG) 0.025 % topical cream Apply  to affected area two (2) times a day. use thin layer      betamethasone dipropionate (DIPROLENE) 0.05 % ointment Apply  to affected area two (2) times a day.  fexofenadine (ALLEGRA) 180 mg tablet Take 180 mg by mouth daily as needed for Allergies.  hydrocortisone (HYTONE) 2.5 % topical cream Apply  to affected area two (2) times a day. use thin layer 30 g 0    cholecalciferol, vitamin D3, (VITAMIN D3) 2,000 unit tab Take  by mouth.  TURMERIC PO Take  by mouth.  folic acid-vit F3-BEM W81 2.2-25-1 mg tab Take  by mouth.  nystatin-triamcinolone (MYCOLOG II) topical cream Apply  to affected area two (2) times daily as needed for Skin Irritation. 30 g 1     No current facility-administered medications on file prior to encounter. Date Last Reviewed: 7/8/2020  Social History/Home Situation:       Work/Activity History: Retired     OBJECTIVE:  Objective Measure: Tool Used: National Outcomes Measurement System: Functional Communication Measures: ATTENTION  Score:  Initial: 4 Most Recent: X (Date: -- )   Interpretation of Tool:  This measure describes the change in functional communication status subsequent to speech-language pathology treatment of patients who have attention deficits.   o Level 1:  Attention is nonfunctional. The individual is generally unresponsive to most stimuli.  o Level 2: The individual can briefly attend with consistent maximal stimulation, but not long enough to complete even simple living tasks. o Level 3: The individual maintains attention over time to complete simple living tasks of short duration with consistent maximal cueing in the absence of distracting stimuli.  o Level 4: The individual maintains attention during simple living tasks of multiple steps and long duration within a minimally distracting environment with consistent minimal cueing. o Level 5: The individual maintains attention within simple living activities with occasional minimal cues within distracting environments. The individual requires increased cueing to start, continue, and change attention during complex activities. o Level 6: The individual maintains attention within complex activities, and can attend simultaneously to multiple demands with rare minimal cues. The individual usually uses compensatory strategies when encountering difficulty. The individual has mild difficulty or takes more than a reasonable amount of time to attend to multiple tasks/stimuli  o Level 7: The individuals ability to participate in vocational, a vocational or social activities is not limited by attentional abilities. Independent functioning may occasionally include the use of compensatory strategies. Score Level 7 Level 6 Level 5 Level 4 Level 3 Level 2 Level 1   Modifier CH CI CJ CK CL CM CN       Tool Used: National Outcomes Measurement System: Functional Communication Measures: MEMORY  Score:  Initial: 4 Most Recent: X (Date: -- )   Interpretation of Tool: This measure describes the change in functional communication status subsequent to speech-language pathology treatment of patients who have memory deficits. o Level 1:  The individual is unable to recall any information, regardless of cueing.   o Level 2: The individual consistently requires maximal verbal cues or uses external aids to recall personal information (e.g., family members, biographical information, physical location, etc.) in structured environments. o Level 3: The individual usually requires maximum cues to recall or use external aids for simple routine and personal information (e.g., schedule, names of familiar staff, location of therapy areas, etc.) in structured environments. o Level 4: The individual occasionally requires minimal cues to recall or use external memory aids for simple routine and personal information in structured environments. The individual requires consistent maximal cues to recall or use memory aids for complex and novel information (e.g., carry out multiple steps activities, accommodate schedule changes, anticipate meal times, etc.), plan and follow through on simple future events (e.g., use calendar to keep appointments, use log books to complete a single assignment/task, etc.) in structured environments. o Level 5 The individual consistently requires minimal cues to recall or use external memory aids for complex and novel information. The individual consistently requires minimal cues to plan and follow through on complex future events (e.g., menu planning and meal preparation, planning a party, etc.).  o Level 6: The individual is able to recall or use external aids/memory strategies for complex information and planning complex future events most of the time. When there is a breakdown in the use of recall/memory strategies/external memory aids, the individual occasionally requires minimal cues. These breakdowns may occasionally interfere with the individuals functioning in vocational, avocational, and social activities. o Level 7: The individual is successful and independent in recalling or using external aids/memory strategies for complex information and planning future events in all vocational, avocational, and social activities.   Score Level 7 Level 6 Level 5 Level 4 Level 3 Level 2 Level 1   Modifier CH CI CJ CK CL CM CN     Oral Motor Structure/Speech: SPEECH-LANGUAGE COGNITIVE EVALUATION  Tests Given: Clementeen Marker Information Processing Assessment    Mental Status: Alert     Neuro-Linguistics:   1. Talked about recent memory events over the weekened independently 100% accuracy     2. Orientation  Month +  Date +  Year +    3. Visual inattention task   1st trial: 0 errors  2nd trial: 0 errors  3rd trial: 0 errors    4. Completed following written and oral directions 2 step 4 components  1st trial: Min A with 80% accuracy due to distractions and lack of attention     5. Completed immediate memory task with added distraction at: Mod A with 50% accuracy due to decreased attention and concentration     6. Pragmatics: WNL's    Cognitive Skills Activities: Activities/Procedures listed utilized to improve and/or restore cognitive function as related to memory and compensatory strategies for recall. Required moderate manual and   cueing to improve improve recall of information and perform graded activities focusing on attention skills. __________________________________________________________________________________________________  History of Present Injury/Illness (Reason for Referral): CVA   Treatment Assessment:  Evaluation completed. Progression/Medical Necessity:   · Patient is expected to demonstrate progress in cognitive ability to increase independence with activities of daily living. Compliance with Program/Exercises: Will assess as treatment progresses}. Reason for Continuation of Services/Other Comments:  · Patient continues to require skilled intervention due to CVA. Recommendations/Intent for next treatment session: \"Treatment next visit will focus on goals\". Total Treatment Duration:    Time In: 1300  Time Out: [address] [place], [place]. [facility]

## 2020-07-09 ENCOUNTER — HOSPITAL ENCOUNTER (OUTPATIENT)
Dept: PHYSICAL THERAPY | Age: 85
Discharge: HOME | End: 2020-07-09
Attending: INTERNAL MEDICINE
Payer: MEDICARE

## 2020-07-09 PROCEDURE — 92507 TX SP LANG VOICE COMM INDIV: CPT | Performed by: SPEECH-LANGUAGE PATHOLOGIST

## 2020-07-09 NOTE — PROGRESS NOTES
Tate Shepard  : 1935  [place]  Secondary: [address] at [address], [address], [place] [address].  [phone]   [phone]         OUTPATIENT SPEECH LANGUAGE PATHOLOGY: Daily Note    ICD-10: Treatment Diagnosis: Cognitive Communication deficit R41.841   REFERRING PHYSICIAN: Val Belcher MD MD Orders: Evaluate and Treat  Return Physician Appointment: Unknown  PAST MEDICAL HISTORY:   Mr. Anthony Madsen is a 81 y.o. male who  has a past medical history of Abdominal pain (2016), Acute prostatitis, Backache, unspecified, Calculus of kidney, Cancer ([facility] [address] Diabetes ([facility] [address] Diverticulosis (14), Edema (2016), Elevated prostate specific antigen (PSA), Essential hypertension, benign, GERD (gastroesophageal reflux disease), H. pylori infection (14), History of barium enema (3/18/14), History of BPH, Hypertension, Hypertrophy of prostate without urinary obstruction and other lower urinary tract symptoms (LUTS), Impotence of organic origin, Lumbago, Malignant neoplasm of prostate ([address] Microscopic hematuria, Multiple renal cysts (14), Osteopenia determined by x-ray, Other ill-defined conditions(799.89), PVCs (premature ventricular contractions) (2016), and SSS (sick sinus syndrome) ([address] (2016). He also  has a past surgical history that includes hx other surgical; hx hernia repair; hx radical prostatectomy; hx urological; hx tonsil and adenoidectomy; hx cholecystectomy; hx colonoscopy (); and hx endoscopy (2017). MEDICAL/REFERRING DIAGNOSIS: speech  DATE OF ONSET: 2019  PRIOR LEVEL OF FUNCTION: Independent ADL's  PRECAUTIONS/ALLERGIES: Seasonal allergies   ASSESSMENT:  Patient is an 81year old male who was referred for speech evaluation due to CVA. He suffered a CVA on 2019. He was admitted to [facility] and only stayed just two days and was discharged home.   Patient reports difficulty with memory recall. He does have some difficulty with names as well. He reports that he doesn't have issues with long term recall just short term. He reports that he was having some difficulty with his memory prior to his CVA. Wife reports that he's having some difficulty with reading, writing, and balancing checkbook. Patient used to read a lot and now he's not reading as much. He also endorses some difficulty with adapting to change especially if things change with how he checks his finances online etc.  He was independent with ADL's prior to his CVA. Patient is present this date. He completed cognitive task with minimal assist. He is impulsive during his tasks but otherwise he is doing well. Just need to cue him to slow down. Please see below for specifics. Patient will benefit from skilled intervention to address the above impairments. ?????? ? ? This section established at most recent assessment??????????  PROBLEM LIST (Impairments causing functional limitations):  1. Cognitive decline  GOALS: (Goals have been discussed and agreed upon with patient.)  SHORT-TERM FUNCTIONAL GOALS: Time Frame: 12  1. The patient will complete a  (sustained, selective, alternating , divided) attention task with  no more than 3 number of redirections when given min  (verbal, visual, tactile) cues with 80%  Accuracy. Ongoing 6/10/20  2. The patient will demonstrate adequate attention to therapy tasks with no more than   two redirections in a 45-60 min session when given min cues with 80% accuracy. 3. The patient will demonstrate the ability to follow multi-step directions related to functional living  environment with 80%verbal, visual and tactile cues in order to increase functional integration  into environment. Ongoing 6/10/2020  4.  The patient will demonstrate recall of functional information following a/an (immediate, shortterm,  long-term) delay with 75% accuracy with mod cues in order to increase functional integration into environment. Ongoing 6/10/2020  5. The patient will complete a mental manipulation task with 80% accuracy and min cueing in order  to increase functional integration into environment. Ongoing 6/10/2020  6. The patient will complete executive functioning skills such as money and medication management at Mod I 90% accuracy. Ongoing 6/10/2020  DISCHARGE GOALS: Time Frame: 3 months  Client will develop functional, cognitive-linguistic-based skills and utilize compensatory  strategies to communicate wants and needs effectively, maintain safety during ADLs and  participate socially in functional living environment  REHABILITATION POTENTIAL FOR STATED GOALS: [address]:  INTERVENTIONS PLANNED: (Benefits and precautions of therapy have been discussed with the patient.)  1. Speech therapy  TREATMENT PLAN EFFECTIVE DATES: 6/10/2020 TO 9/10/2020 (90 days). FREQUENCY/DURATION: Continue to follow patient 2 times a week for 90 days to address above goals. Regarding Steffanie Corrales's therapy, I certify that the treatment plan above will be carried out by a therapist or under their direction. Thank you for this referral,  DELVIN Zheng Ed CCC-SLP                  Referring Physician Signature: Lisa Ronquillo MD     Date      SUBJECTIVE:  Alert  Present Symptoms: Cognitive deficits     Current Medications:   Current Outpatient Medications on File Prior to Encounter   Medication Sig Dispense Refill    cefdinir (OMNICEF) 300 mg capsule Take 300 mg by mouth two (2) times a day.  simethicone (GAS-X MAXIMUM STRENGTH PO) Take  by mouth.  polyethylene glycol 3350 (MIRALAX PO) Take  by mouth.  COQ10, UBIQUINOL, PO Take  by mouth.  ALPHA LIPOIC ACID PO Take  by mouth.  acetylcarnitine HCl (ACETYL L-CARNITINE PO) Take  by mouth.  valsartan (Diovan) 80 mg tablet Take 1 Tab by mouth daily.  30 Tab 5    cloNIDine HCL (CATAPRES) 0.1 mg tablet Take 1 Tab by mouth daily as needed (take for BP greater than 160/90). 30 Tab 0    famotidine (PEPCID) 20 mg tablet Take 1 Tab by mouth two (2) times a day. 60 Tab 5    OTHER Indications: Tynelol 500 mg      dilTIAZem CD (CARDIZEM CD) 120 mg ER capsule Take 1 Cap by mouth daily. 30 Cap 5    magnesium oxide (MAG-OX) 400 mg tablet TAKE 1 TAB BY MOUTH TWO (2) TIMES A DAY. 120 Tab 3    meclizine (ANTIVERT) 25 mg tablet Take 1 Tab by mouth three (3) times daily as needed for Dizziness. 90 Tab 2    aspirin 81 mg chewable tablet Take 1 Tab by mouth daily. 30 Tab 0    atorvastatin (LIPITOR) 80 mg tablet Take 1 Tab by mouth nightly. 90 Tab 3    docusate sodium (STOOL SOFTENER) 100 mg tab Take  by mouth.  OTHER Organic Smooth Move for constipation      fluticasone propionate (FLONASE) 50 mcg/actuation nasal spray 2 Sprays by Both Nostrils route daily. 1 Bottle 5    triamcinolone acetonide (KENALOG) 0.025 % topical cream Apply  to affected area two (2) times a day. use thin layer      betamethasone dipropionate (DIPROLENE) 0.05 % ointment Apply  to affected area two (2) times a day.  fexofenadine (ALLEGRA) 180 mg tablet Take 180 mg by mouth daily as needed for Allergies.  hydrocortisone (HYTONE) 2.5 % topical cream Apply  to affected area two (2) times a day. use thin layer 30 g 0    cholecalciferol, vitamin D3, (VITAMIN D3) 2,000 unit tab Take  by mouth.  TURMERIC PO Take  by mouth.  folic acid-vit H8-NKT S03 2.2-25-1 mg tab Take  by mouth.  nystatin-triamcinolone (MYCOLOG II) topical cream Apply  to affected area two (2) times daily as needed for Skin Irritation. 30 g 1     No current facility-administered medications on file prior to encounter. Date Last Reviewed: 7/9/2020  Social History/Home Situation:       Work/Activity History: Retired     OBJECTIVE:  Objective Measure:   Tool Used: National Outcomes Measurement System: Functional Communication Measures: ATTENTION  Score:  Initial: 4 Most Recent: X (Date: -- )   Interpretation of Tool:  This measure describes the change in functional communication status subsequent to speech-language pathology treatment of patients who have attention deficits. o Level 1:  Attention is nonfunctional. The individual is generally unresponsive to most stimuli.  o Level 2: The individual can briefly attend with consistent maximal stimulation, but not long enough to complete even simple living tasks. o Level 3: The individual maintains attention over time to complete simple living tasks of short duration with consistent maximal cueing in the absence of distracting stimuli.  o Level 4: The individual maintains attention during simple living tasks of multiple steps and long duration within a minimally distracting environment with consistent minimal cueing. o Level 5: The individual maintains attention within simple living activities with occasional minimal cues within distracting environments. The individual requires increased cueing to start, continue, and change attention during complex activities. o Level 6: The individual maintains attention within complex activities, and can attend simultaneously to multiple demands with rare minimal cues. The individual usually uses compensatory strategies when encountering difficulty. The individual has mild difficulty or takes more than a reasonable amount of time to attend to multiple tasks/stimuli  o Level 7: The individuals ability to participate in vocational, a vocational or social activities is not limited by attentional abilities. Independent functioning may occasionally include the use of compensatory strategies.   Score Level 7 Level 6 Level 5 Level 4 Level 3 Level 2 Level 1   Modifier CH CI CJ CK CL CM CN       Tool Used: National Outcomes Measurement System: Functional Communication Measures: MEMORY  Score:  Initial: 4 Most Recent: X (Date: -- )   Interpretation of Tool: This measure describes the change in functional communication status subsequent to speech-language pathology treatment of patients who have memory deficits. o Level 1:  The individual is unable to recall any information, regardless of cueing. o Level 2: The individual consistently requires maximal verbal cues or uses external aids to recall personal information (e.g., family members, biographical information, physical location, etc.) in structured environments. o Level 3: The individual usually requires maximum cues to recall or use external aids for simple routine and personal information (e.g., schedule, names of familiar staff, location of therapy areas, etc.) in structured environments. o Level 4: The individual occasionally requires minimal cues to recall or use external memory aids for simple routine and personal information in structured environments. The individual requires consistent maximal cues to recall or use memory aids for complex and novel information (e.g., carry out multiple steps activities, accommodate schedule changes, anticipate meal times, etc.), plan and follow through on simple future events (e.g., use calendar to keep appointments, use log books to complete a single assignment/task, etc.) in structured environments. o Level 5 The individual consistently requires minimal cues to recall or use external memory aids for complex and novel information. The individual consistently requires minimal cues to plan and follow through on complex future events (e.g., menu planning and meal preparation, planning a party, etc.).  o Level 6: The individual is able to recall or use external aids/memory strategies for complex information and planning complex future events most of the time. When there is a breakdown in the use of recall/memory strategies/external memory aids, the individual occasionally requires minimal cues.   These breakdowns may occasionally interfere with the individuals functioning in vocational, avocational, and social activities. o Level 7: The individual is successful and independent in recalling or using external aids/memory strategies for complex information and planning future events in all vocational, avocational, and social activities. Score Level 7 Level 6 Level 5 Level 4 Level 3 Level 2 Level 1   Modifier CH CI CJ CK CL CM CN     Oral Motor Structure/Speech:       SPEECH-LANGUAGE COGNITIVE EVALUATION  Tests Given: [employer] Information Processing Assessment    Mental Status: Alert     Neuro-Linguistics:   1. Talked about recent memory events over the weekened independently 100% accuracy     2. Orientation  Month +  Date +  Year +    3. Visual inattention task (scanning 2 numbers):     1st trial: 11 errors  2nd trial: 1 error  3rd trial: 4 errors  4th trial:  1 error    *due to decreased attention and concentration      4. Recalling directions:     Pragmatics: WNL's    Cognitive Skills Activities: Activities/Procedures listed utilized to improve and/or restore cognitive function as related to memory and compensatory strategies for recall. Required moderate manual and   cueing to improve improve recall of information and perform graded activities focusing on attention skills. __________________________________________________________________________________________________  History of Present Injury/Illness (Reason for Referral): CVA   Treatment Assessment:  Evaluation completed. Progression/Medical Necessity:   · Patient is expected to demonstrate progress in cognitive ability to increase independence with activities of daily living. Compliance with Program/Exercises: Will assess as treatment progresses}. Reason for Continuation of Services/Other Comments:  · Patient continues to require skilled intervention due to CVA. Recommendations/Intent for next treatment session: \"Treatment next visit will focus on goals\". Total Treatment Duration:    Time In: 1345  Time Out: [id number]  Saul Eddy. Julia Steiner

## 2020-07-15 ENCOUNTER — HOSPITAL ENCOUNTER (OUTPATIENT)
Dept: PHYSICAL THERAPY | Age: 85
Discharge: HOME | End: 2020-07-15
Attending: INTERNAL MEDICINE
Payer: MEDICARE

## 2020-07-15 PROCEDURE — 92507 TX SP LANG VOICE COMM INDIV: CPT | Performed by: SPEECH-LANGUAGE PATHOLOGIST

## 2020-07-15 NOTE — PROGRESS NOTES
John Shelley  : 1935  [place]  Secondary: [address] at [address] [address], [address], [place] [address].  [phone]   [phone]         OUTPATIENT SPEECH LANGUAGE PATHOLOGY: Daily Note    ICD-10: Treatment Diagnosis: Cognitive Communication deficit R41.841   REFERRING PHYSICIAN: Arnie Archibald MD MD Orders: Evaluate and Treat  Return Physician Appointment: Unknown  PAST MEDICAL HISTORY:   Mr. Alex Rai is a 81 y.o. male who  has a past medical history of Abdominal pain (2016), Acute prostatitis, Backache, unspecified, Calculus of kidney, Cancer ([facility] Diabetes ([facility] Diverticulosis (14), Edema (2016), Elevated prostate specific antigen (PSA), Essential hypertension, benign, GERD (gastroesophageal reflux disease), H. pylori infection (14), History of barium enema (3/18/14), History of BPH, Hypertension, Hypertrophy of prostate without urinary obstruction and other lower urinary tract symptoms (LUTS), Impotence of organic origin, Lumbago, Malignant neoplasm of prostate ([address] Microscopic hematuria, Multiple renal cysts (14), Osteopenia determined by x-ray, Other ill-defined conditions(799.89), PVCs (premature ventricular contractions) (2016), and SSS (sick sinus syndrome) ([facility] (2016). He also  has a past surgical history that includes hx other surgical; hx hernia repair; hx radical prostatectomy; hx urological; hx tonsil and adenoidectomy; hx cholecystectomy; hx colonoscopy (); and hx endoscopy (2017). MEDICAL/REFERRING DIAGNOSIS: speech  DATE OF ONSET: 2019  PRIOR LEVEL OF FUNCTION: Independent ADL's  PRECAUTIONS/ALLERGIES: Seasonal allergies   ASSESSMENT:  Patient is an 81year old male who was referred for speech evaluation due to CVA. He suffered a CVA on 2019. He was admitted to [facility] and only stayed just two days and was discharged home.   Patient reports difficulty with memory recall. He does have some difficulty with names as well. He reports that he doesn't have issues with long term recall just short term. He reports that he was having some difficulty with his memory prior to his CVA. Wife reports that he's having some difficulty with reading, writing, and balancing checkbook. Patient used to read a lot and now he's not reading as much. He also endorses some difficulty with adapting to change especially if things change with how he checks his finances online etc.  He was independent with ADL's prior to his CVA. Patient is present this date. He completed cognitive task with minimal assist. He is impulsive during his tasks but otherwise he is doing well. Just need to cue him to slow down. Please see below for specifics. He's having increased difficulty with memory recall probably due to his age and CVA. We spent time going over strategies for memory recall especially for ADL's such as appointments etc.       Patient will benefit from skilled intervention to address the above impairments. ?????? ? ? This section established at most recent assessment??????????  PROBLEM LIST (Impairments causing functional limitations):  1. Cognitive decline  GOALS: (Goals have been discussed and agreed upon with patient.)  SHORT-TERM FUNCTIONAL GOALS: Time Frame: 12  1. The patient will complete a  (sustained, selective, alternating , divided) attention task with  no more than 3 number of redirections when given min  (verbal, visual, tactile) cues with 80%  Accuracy. Ongoing 6/10/20  2. The patient will demonstrate adequate attention to therapy tasks with no more than   two redirections in a 45-60 min session when given min cues with 80% accuracy. 3. The patient will demonstrate the ability to follow multi-step directions related to functional living  environment with 80%verbal, visual and tactile cues in order to increase functional integration  into environment. Ongoing 6/10/2020  4. The patient will demonstrate recall of functional information following a/an (immediate, shortterm,  long-term) delay with 75% accuracy with mod cues in order to increase functional integration into environment. Ongoing 6/10/2020  5. The patient will complete a mental manipulation task with 80% accuracy and min cueing in order  to increase functional integration into environment. Ongoing 6/10/2020  6. The patient will complete executive functioning skills such as money and medication management at Mod I 90% accuracy. Ongoing 6/10/2020  DISCHARGE GOALS: Time Frame: 3 months  Client will develop functional, cognitive-linguistic-based skills and utilize compensatory  strategies to communicate wants and needs effectively, maintain safety during ADLs and  participate socially in functional living environment  REHABILITATION POTENTIAL FOR STATED GOALS: [address]:  INTERVENTIONS PLANNED: (Benefits and precautions of therapy have been discussed with the patient.)  1. Speech therapy  TREATMENT PLAN EFFECTIVE DATES: 6/10/2020 TO 9/10/2020 (90 days). FREQUENCY/DURATION: Continue to follow patient 2 times a week for 90 days to address above goals. Regarding Tamia Corrales's therapy, I certify that the treatment plan above will be carried out by a therapist or under their direction. Thank you for this referral,  DELVIN Henderson Ed CCC-SLP                  Referring Physician Signature: Noel Lomeli MD     Date      SUBJECTIVE:  Alert  Present Symptoms: Cognitive deficits  Pain Intensity 1: 0  Current Medications:   Current Outpatient Medications on File Prior to Encounter   Medication Sig Dispense Refill    cefdinir (OMNICEF) 300 mg capsule Take 300 mg by mouth two (2) times a day.  simethicone (GAS-X MAXIMUM STRENGTH PO) Take  by mouth.  polyethylene glycol 3350 (MIRALAX PO) Take  by mouth.  COQ10, UBIQUINOL, PO Take  by mouth.  ALPHA LIPOIC ACID PO Take  by mouth.       acetylcarnitine HCl (ACETYL L-CARNITINE PO) Take  by mouth.  valsartan (Diovan) 80 mg tablet Take 1 Tab by mouth daily. 30 Tab 5    cloNIDine HCL (CATAPRES) 0.1 mg tablet Take 1 Tab by mouth daily as needed (take for BP greater than 160/90). 30 Tab 0    famotidine (PEPCID) 20 mg tablet Take 1 Tab by mouth two (2) times a day. 60 Tab 5    OTHER Indications: Tynelol 500 mg      dilTIAZem CD (CARDIZEM CD) 120 mg ER capsule Take 1 Cap by mouth daily. 30 Cap 5    magnesium oxide (MAG-OX) 400 mg tablet TAKE 1 TAB BY MOUTH TWO (2) TIMES A DAY. 120 Tab 3    meclizine (ANTIVERT) 25 mg tablet Take 1 Tab by mouth three (3) times daily as needed for Dizziness. 90 Tab 2    aspirin 81 mg chewable tablet Take 1 Tab by mouth daily. 30 Tab 0    atorvastatin (LIPITOR) 80 mg tablet Take 1 Tab by mouth nightly. 90 Tab 3    docusate sodium (STOOL SOFTENER) 100 mg tab Take  by mouth.  OTHER Organic Smooth Move for constipation      fluticasone propionate (FLONASE) 50 mcg/actuation nasal spray 2 Sprays by Both Nostrils route daily. 1 Bottle 5    triamcinolone acetonide (KENALOG) 0.025 % topical cream Apply  to affected area two (2) times a day. use thin layer      betamethasone dipropionate (DIPROLENE) 0.05 % ointment Apply  to affected area two (2) times a day.  fexofenadine (ALLEGRA) 180 mg tablet Take 180 mg by mouth daily as needed for Allergies.  hydrocortisone (HYTONE) 2.5 % topical cream Apply  to affected area two (2) times a day. use thin layer 30 g 0    cholecalciferol, vitamin D3, (VITAMIN D3) 2,000 unit tab Take  by mouth.  TURMERIC PO Take  by mouth.  folic acid-vit S5-TGZ V20 2.2-25-1 mg tab Take  by mouth.  nystatin-triamcinolone (MYCOLOG II) topical cream Apply  to affected area two (2) times daily as needed for Skin Irritation. 30 g 1     No current facility-administered medications on file prior to encounter.          Date Last Reviewed: 7/15/2020  Social History/Home Situation:       Work/Activity History: Retired     OBJECTIVE:  Objective Measure: Tool Used: National Outcomes Measurement System: Functional Communication Measures: ATTENTION  Score:  Initial: 4 Most Recent: X (Date: -- )   Interpretation of Tool:  This measure describes the change in functional communication status subsequent to speech-language pathology treatment of patients who have attention deficits. o Level 1:  Attention is nonfunctional. The individual is generally unresponsive to most stimuli.  o Level 2: The individual can briefly attend with consistent maximal stimulation, but not long enough to complete even simple living tasks. o Level 3: The individual maintains attention over time to complete simple living tasks of short duration with consistent maximal cueing in the absence of distracting stimuli.  o Level 4: The individual maintains attention during simple living tasks of multiple steps and long duration within a minimally distracting environment with consistent minimal cueing. o Level 5: The individual maintains attention within simple living activities with occasional minimal cues within distracting environments. The individual requires increased cueing to start, continue, and change attention during complex activities. o Level 6: The individual maintains attention within complex activities, and can attend simultaneously to multiple demands with rare minimal cues. The individual usually uses compensatory strategies when encountering difficulty. The individual has mild difficulty or takes more than a reasonable amount of time to attend to multiple tasks/stimuli  o Level 7: The individuals ability to participate in vocational, a vocational or social activities is not limited by attentional abilities. Independent functioning may occasionally include the use of compensatory strategies.   Score Level 7 Level 6 Level 5 Level 4 Level 3 Level 2 Level 1   Modifier CH CI CJ CK CL CM CN Tool Used: National Outcomes Measurement System: Functional Communication Measures: MEMORY  Score:  Initial: 4 Most Recent: X (Date: -- )   Interpretation of Tool: This measure describes the change in functional communication status subsequent to speech-language pathology treatment of patients who have memory deficits. o Level 1:  The individual is unable to recall any information, regardless of cueing. o Level 2: The individual consistently requires maximal verbal cues or uses external aids to recall personal information (e.g., family members, biographical information, physical location, etc.) in structured environments. o Level 3: The individual usually requires maximum cues to recall or use external aids for simple routine and personal information (e.g., schedule, names of familiar staff, location of therapy areas, etc.) in structured environments. o Level 4: The individual occasionally requires minimal cues to recall or use external memory aids for simple routine and personal information in structured environments. The individual requires consistent maximal cues to recall or use memory aids for complex and novel information (e.g., carry out multiple steps activities, accommodate schedule changes, anticipate meal times, etc.), plan and follow through on simple future events (e.g., use calendar to keep appointments, use log books to complete a single assignment/task, etc.) in structured environments. o Level 5 The individual consistently requires minimal cues to recall or use external memory aids for complex and novel information. The individual consistently requires minimal cues to plan and follow through on complex future events (e.g., menu planning and meal preparation, planning a party, etc.).  o Level 6: The individual is able to recall or use external aids/memory strategies for complex information and planning complex future events most of the time. When there is a breakdown in the use of recall/memory strategies/external memory aids, the individual occasionally requires minimal cues. These breakdowns may occasionally interfere with the individuals functioning in vocational, avocational, and social activities. o Level 7: The individual is successful and independent in recalling or using external aids/memory strategies for complex information and planning future events in all vocational, avocational, and social activities. Score Level 7 Level 6 Level 5 Level 4 Level 3 Level 2 Level 1   Modifier CH CI CJ CK CL CM CN     Oral Motor Structure/Speech:       SPEECH-LANGUAGE COGNITIVE EVALUATION  Tests Given: [employer] Information Processing Assessment    Mental Status: Alert     Neuro-Linguistics:   1. Talked about recent memory events over the weekened independently 100% accuracy     2. Orientation  Month +  Date +  Year +    3. Visual inattention task (scanning 2 numbers):     1st trial: 11 errors  2nd trial: 1 error  3rd trial: 4 errors  4th trial:  1 error    *due to decreased attention and concentration      4. Recalling directions: Mod A with 50% accuracy due to decreased memory and comprehension    Pragmatics: WNL's    Cognitive Skills Activities: Activities/Procedures listed utilized to improve and/or restore cognitive function as related to memory and compensatory strategies for recall. Required moderate manual and   cueing to improve improve recall of information and perform graded activities focusing on attention skills. __________________________________________________________________________________________________  History of Present Injury/Illness (Reason for Referral): CVA   Treatment Assessment:  Evaluation completed. Progression/Medical Necessity:   · Patient is expected to demonstrate progress in cognitive ability to increase independence with activities of daily living. Compliance with Program/Exercises: Will assess as treatment progresses}.    Reason for Continuation of Services/Other Comments:  · Patient continues to require skilled intervention due to CVA. Recommendations/Intent for next treatment session: \"Treatment next visit will focus on goals\". Total Treatment Duration:  Time In: 1300   Time Out: [address] [place], [place]. Nisa Gomes

## 2020-07-17 ENCOUNTER — HOSPITAL ENCOUNTER (OUTPATIENT)
Dept: PHYSICAL THERAPY | Age: 85
Discharge: HOME | End: 2020-07-17
Attending: INTERNAL MEDICINE
Payer: MEDICARE

## 2020-07-17 NOTE — PROGRESS NOTES
Speech Pathology      Patient cancelled due to wife having a medical procedure today. Will f/u at next scheduled visit. DELVIN Batista

## 2020-07-20 ENCOUNTER — HOSPITAL ENCOUNTER (OUTPATIENT)
Dept: PHYSICAL THERAPY | Age: 85
Discharge: HOME | End: 2020-07-20
Attending: INTERNAL MEDICINE
Payer: MEDICARE

## 2020-07-20 PROCEDURE — 92507 TX SP LANG VOICE COMM INDIV: CPT | Performed by: SPEECH-LANGUAGE PATHOLOGIST

## 2020-07-20 NOTE — PROGRESS NOTES
Veronica Barrios  : 1935  [place]  Secondary: [address] at [address] [place] [address], [address], [facility]  [phone]   [phone]         OUTPATIENT SPEECH LANGUAGE PATHOLOGY: Daily Note    ICD-10: Treatment Diagnosis: Cognitive Communication deficit R41.841   REFERRING PHYSICIAN: Katie Hidalgo MD MD Orders: Evaluate and Treat  Return Physician Appointment: Unknown  PAST MEDICAL HISTORY:   Mr. uQoc Vásquez is a 81 y.o. male who  has a past medical history of Abdominal pain (2016), Acute prostatitis, Backache, unspecified, Calculus of kidney, Cancer ([facility] [address] Diabetes ([facility] [address] Diverticulosis (14), Edema (2016), Elevated prostate specific antigen (PSA), Essential hypertension, benign, GERD (gastroesophageal reflux disease), H. pylori infection (14), History of barium enema (3/18/14), History of BPH, Hypertension, Hypertrophy of prostate without urinary obstruction and other lower urinary tract symptoms (LUTS), Impotence of organic origin, Lumbago, Malignant neoplasm of prostate ([address] Microscopic hematuria, Multiple renal cysts (14), Osteopenia determined by x-ray, Other ill-defined conditions(799.89), PVCs (premature ventricular contractions) (2016), and SSS (sick sinus syndrome) ([facility] [address] (2016). He also  has a past surgical history that includes hx other surgical; hx hernia repair; hx radical prostatectomy; hx urological; hx tonsil and adenoidectomy; hx cholecystectomy; hx colonoscopy (); and hx endoscopy (2017). MEDICAL/REFERRING DIAGNOSIS: speech  DATE OF ONSET: 2019  PRIOR LEVEL OF FUNCTION: Independent ADL's  PRECAUTIONS/ALLERGIES: Seasonal allergies   ASSESSMENT:  Patient is an 81year old male who was referred for speech evaluation due to CVA. He suffered a CVA on 2019. He was admitted to [facility] and only stayed just two days and was discharged home.   Patient reports difficulty with memory recall. He does have some difficulty with names as well. He reports that he doesn't have issues with long term recall just short term. He reports that he was having some difficulty with his memory prior to his CVA. Wife reports that he's having some difficulty with reading, writing, and balancing checkbook. Patient used to read a lot and now he's not reading as much. He also endorses some difficulty with adapting to change especially if things change with how he checks his finances online etc.  He was independent with ADL's prior to his CVA. Patient is present this date. He completed cognitive task with minimal assist. He is impulsive during his tasks but otherwise he is doing well. Just need to cue him to slow down. Please see below for specifics. He's having increased difficulty with memory recall probably due to his age and CVA. We spent time going over strategies for memory recall especially for ADL's such as appointments etc.       Patient will benefit from skilled intervention to address the above impairments. ?????? ? ? This section established at most recent assessment??????????  PROBLEM LIST (Impairments causing functional limitations):  1. Cognitive decline  GOALS: (Goals have been discussed and agreed upon with patient.)  SHORT-TERM FUNCTIONAL GOALS: Time Frame: 12  1. The patient will complete a  (sustained, selective, alternating , divided) attention task with  no more than 3 number of redirections when given min  (verbal, visual, tactile) cues with 80%  Accuracy. Ongoing 6/10/20  2. The patient will demonstrate adequate attention to therapy tasks with no more than   two redirections in a 45-60 min session when given min cues with 80% accuracy. 3. The patient will demonstrate the ability to follow multi-step directions related to functional living  environment with 80%verbal, visual and tactile cues in order to increase functional integration  into environment. Ongoing 6/10/2020  4. The patient will demonstrate recall of functional information following a/an (immediate, shortterm,  long-term) delay with 75% accuracy with mod cues in order to increase functional integration into environment. Ongoing 6/10/2020  5. The patient will complete a mental manipulation task with 80% accuracy and min cueing in order  to increase functional integration into environment. Ongoing 6/10/2020  6. The patient will complete executive functioning skills such as money and medication management at Mod I 90% accuracy. Ongoing 6/10/2020  DISCHARGE GOALS: Time Frame: 3 months  Client will develop functional, cognitive-linguistic-based skills and utilize compensatory  strategies to communicate wants and needs effectively, maintain safety during ADLs and  participate socially in functional living environment  REHABILITATION POTENTIAL FOR STATED GOALS: [address]:  INTERVENTIONS PLANNED: (Benefits and precautions of therapy have been discussed with the patient.)  1. Speech therapy  TREATMENT PLAN EFFECTIVE DATES: 6/10/2020 TO 9/10/2020 (90 days). FREQUENCY/DURATION: Continue to follow patient 2 times a week for 90 days to address above goals. Regarding Krysta Corrales's therapy, I certify that the treatment plan above will be carried out by a therapist or under their direction. Thank you for this referral,  DELVIN Diamond Ed CCC-SLP                  Referring Physician Signature: Benedicto Rodrigues MD     Date      SUBJECTIVE:  Alert  Present Symptoms: Cognitive deficits  Pain Intensity 1: 0  Current Medications:   Current Outpatient Medications on File Prior to Encounter   Medication Sig Dispense Refill    simethicone (GAS-X MAXIMUM STRENGTH PO) Take  by mouth.  polyethylene glycol 3350 (MIRALAX PO) Take  by mouth.  COQ10, UBIQUINOL, PO Take  by mouth.  ALPHA LIPOIC ACID PO Take  by mouth.  acetylcarnitine HCl (ACETYL L-CARNITINE PO) Take  by mouth.       valsartan (Diovan) 80 mg tablet Take 1 Tab by mouth daily. 30 Tab 5    cloNIDine HCL (CATAPRES) 0.1 mg tablet Take 1 Tab by mouth daily as needed (take for BP greater than 160/90). 30 Tab 0    famotidine (PEPCID) 20 mg tablet Take 1 Tab by mouth two (2) times a day. 60 Tab 5    OTHER Indications: Tynelol 500 mg      dilTIAZem CD (CARDIZEM CD) 120 mg ER capsule Take 1 Cap by mouth daily. 30 Cap 5    magnesium oxide (MAG-OX) 400 mg tablet TAKE 1 TAB BY MOUTH TWO (2) TIMES A DAY. 120 Tab 3    meclizine (ANTIVERT) 25 mg tablet Take 1 Tab by mouth three (3) times daily as needed for Dizziness. 90 Tab 2    aspirin 81 mg chewable tablet Take 1 Tab by mouth daily. 30 Tab 0    atorvastatin (LIPITOR) 80 mg tablet Take 1 Tab by mouth nightly. 90 Tab 3    docusate sodium (STOOL SOFTENER) 100 mg tab Take  by mouth.  OTHER Organic Smooth Move for constipation      fluticasone propionate (FLONASE) 50 mcg/actuation nasal spray 2 Sprays by Both Nostrils route daily. 1 Bottle 5    triamcinolone acetonide (KENALOG) 0.025 % topical cream Apply  to affected area two (2) times a day. use thin layer      betamethasone dipropionate (DIPROLENE) 0.05 % ointment Apply  to affected area two (2) times a day.  fexofenadine (ALLEGRA) 180 mg tablet Take 180 mg by mouth daily as needed for Allergies.  hydrocortisone (HYTONE) 2.5 % topical cream Apply  to affected area two (2) times a day. use thin layer 30 g 0    cholecalciferol, vitamin D3, (VITAMIN D3) 2,000 unit tab Take  by mouth.  TURMERIC PO Take  by mouth.  folic acid-vit L9-RDJ A54 2.2-25-1 mg tab Take  by mouth.  nystatin-triamcinolone (MYCOLOG II) topical cream Apply  to affected area two (2) times daily as needed for Skin Irritation. 30 g 1     No current facility-administered medications on file prior to encounter.          Date Last Reviewed: 7/20/2020  Social History/Home Situation:       Work/Activity History: Retired     OBJECTIVE:  Objective Measure: Tool Used: National Outcomes Measurement System: Functional Communication Measures: ATTENTION  Score:  Initial: 4 Most Recent: X (Date: -- )   Interpretation of Tool:  This measure describes the change in functional communication status subsequent to speech-language pathology treatment of patients who have attention deficits. o Level 1:  Attention is nonfunctional. The individual is generally unresponsive to most stimuli.  o Level 2: The individual can briefly attend with consistent maximal stimulation, but not long enough to complete even simple living tasks. o Level 3: The individual maintains attention over time to complete simple living tasks of short duration with consistent maximal cueing in the absence of distracting stimuli.  o Level 4: The individual maintains attention during simple living tasks of multiple steps and long duration within a minimally distracting environment with consistent minimal cueing. o Level 5: The individual maintains attention within simple living activities with occasional minimal cues within distracting environments. The individual requires increased cueing to start, continue, and change attention during complex activities. o Level 6: The individual maintains attention within complex activities, and can attend simultaneously to multiple demands with rare minimal cues. The individual usually uses compensatory strategies when encountering difficulty. The individual has mild difficulty or takes more than a reasonable amount of time to attend to multiple tasks/stimuli  o Level 7: The individuals ability to participate in vocational, a vocational or social activities is not limited by attentional abilities. Independent functioning may occasionally include the use of compensatory strategies.   Score Level 7 Level 6 Level 5 Level 4 Level 3 Level 2 Level 1   Modifier CH CI CJ CK CL CM CN       Tool Used: National Outcomes Measurement System: Functional Communication Measures: MEMORY  Score:  Initial: 4 Most Recent: X (Date: -- )   Interpretation of Tool: This measure describes the change in functional communication status subsequent to speech-language pathology treatment of patients who have memory deficits. o Level 1:  The individual is unable to recall any information, regardless of cueing. o Level 2: The individual consistently requires maximal verbal cues or uses external aids to recall personal information (e.g., family members, biographical information, physical location, etc.) in structured environments. o Level 3: The individual usually requires maximum cues to recall or use external aids for simple routine and personal information (e.g., schedule, names of familiar staff, location of therapy areas, etc.) in structured environments. o Level 4: The individual occasionally requires minimal cues to recall or use external memory aids for simple routine and personal information in structured environments. The individual requires consistent maximal cues to recall or use memory aids for complex and novel information (e.g., carry out multiple steps activities, accommodate schedule changes, anticipate meal times, etc.), plan and follow through on simple future events (e.g., use calendar to keep appointments, use log books to complete a single assignment/task, etc.) in structured environments. o Level 5 The individual consistently requires minimal cues to recall or use external memory aids for complex and novel information. The individual consistently requires minimal cues to plan and follow through on complex future events (e.g., menu planning and meal preparation, planning a party, etc.).  o Level 6: The individual is able to recall or use external aids/memory strategies for complex information and planning complex future events most of the time. When there is a breakdown in the use of recall/memory strategies/external memory aids, the individual occasionally requires minimal cues. These breakdowns may occasionally interfere with the individuals functioning in vocational, avocational, and social activities. o Level 7: The individual is successful and independent in recalling or using external aids/memory strategies for complex information and planning future events in all vocational, avocational, and social activities. Score Level 7 Level 6 Level 5 Level 4 Level 3 Level 2 Level 1   Modifier CH CI CJ CK CL CM CN     Oral Motor Structure/Speech:       SPEECH-LANGUAGE COGNITIVE EVALUATION  Tests Given: [employer] Information Processing Assessment    Mental Status: Alert     Neuro-Linguistics:   1. Talked about recent memory events over the weekened independently 100% accuracy     2. Orientation  Month +  Date +  Year +    3. Visual inattention task (scanning 2 numbers):     1st trial: 0 errors  2nd trial: 0 errors  3rd trial: 0 errors  4th trial:  0 errors    4. Functional memory (directions) at: Mod A with 56% accuracy due to decreased recall and comprehension at times. Pragmatics: WNL's    Cognitive Skills Activities: Activities/Procedures listed utilized to improve and/or restore cognitive function as related to memory and compensatory strategies for recall. Required moderate manual and   cueing to improve improve recall of information and perform graded activities focusing on attention skills. __________________________________________________________________________________________________  History of Present Injury/Illness (Reason for Referral): CVA   Treatment Assessment:  Evaluation completed. Progression/Medical Necessity:   · Patient is expected to demonstrate progress in cognitive ability to increase independence with activities of daily living. Compliance with Program/Exercises: Will assess as treatment progresses}. Reason for Continuation of Services/Other Comments:  · Patient continues to require skilled intervention due to CVA.   Recommendations/Intent for next treatment session: \"Treatment next visit will focus on goals\". Total Treatment Duration:  Time In: 1100   Time Out: [address] [facility],[address], [facility]. Gerard Arguello

## 2020-07-22 ENCOUNTER — HOSPITAL ENCOUNTER (OUTPATIENT)
Dept: PHYSICAL THERAPY | Age: 85
Discharge: HOME | End: 2020-07-22
Attending: INTERNAL MEDICINE
Payer: MEDICARE

## 2020-07-22 PROCEDURE — 92507 TX SP LANG VOICE COMM INDIV: CPT | Performed by: SPEECH-LANGUAGE PATHOLOGIST

## 2020-07-22 NOTE — PROGRESS NOTES
Tate Shepard  : 1935  [place]  Secondary: [address], [address], [place] [address].  [phone]   [phone]         OUTPATIENT SPEECH LANGUAGE PATHOLOGY: Daily Note    ICD-10: Treatment Diagnosis: Cognitive Communication deficit R41.841   REFERRING PHYSICIAN: Val Belcher MD MD Orders: Evaluate and Treat  Return Physician Appointment: Unknown  PAST MEDICAL HISTORY:   Mr. Anthony Madsen is a 81 y.o. male who  has a past medical history of Abdominal pain (2016), Acute prostatitis, Backache, unspecified, Calculus of kidney, Cancer ([facility] [address] Diabetes ([facility] [address] Diverticulosis (14), Edema (2016), Elevated prostate specific antigen (PSA), Essential hypertension, benign, GERD (gastroesophageal reflux disease), H. pylori infection (14), History of barium enema (3/18/14), History of BPH, Hypertension, Hypertrophy of prostate without urinary obstruction and other lower urinary tract symptoms (LUTS), Impotence of organic origin, Lumbago, Malignant neoplasm of prostate ([address] Microscopic hematuria, Multiple renal cysts (14), Osteopenia determined by x-ray, Other ill-defined conditions(799.89), PVCs (premature ventricular contractions) (2016), and SSS (sick sinus syndrome) ([facility] [address] (2016). He also  has a past surgical history that includes hx other surgical; hx hernia repair; hx radical prostatectomy; hx urological; hx tonsil and adenoidectomy; hx cholecystectomy; hx colonoscopy (); and hx endoscopy (2017). MEDICAL/REFERRING DIAGNOSIS: speech  DATE OF ONSET: 2019  PRIOR LEVEL OF FUNCTION: Independent ADL's  PRECAUTIONS/ALLERGIES: Seasonal allergies   ASSESSMENT:  Patient is an 81year old male who was referred for speech evaluation due to CVA. He suffered a CVA on 2019. He was admitted to [facility] and only stayed just two days and was discharged home.   Patient reports difficulty with memory recall. He does have some difficulty with names as well. He reports that he doesn't have issues with long term recall just short term. He reports that he was having some difficulty with his memory prior to his CVA. Wife reports that he's having some difficulty with reading, writing, and balancing checkbook. Patient used to read a lot and now he's not reading as much. He also endorses some difficulty with adapting to change especially if things change with how he checks his finances online etc.  He was independent with ADL's prior to his CVA. Patient is present this date. He completed cognitive task with minimal assist. He is impulsive during his tasks but otherwise he is doing well. Just need to cue him to slow down. Please see below for specifics. He's having increased difficulty with memory recall probably due to his age and CVA. We spent time going over strategies for memory recall especially for ADL's such as appointments etc.       Patient will benefit from skilled intervention to address the above impairments. ?????? ? ? This section established at most recent assessment??????????  PROBLEM LIST (Impairments causing functional limitations):  1. Cognitive decline  GOALS: (Goals have been discussed and agreed upon with patient.)  SHORT-TERM FUNCTIONAL GOALS: Time Frame: 12  1. The patient will complete a  (sustained, selective, alternating , divided) attention task with  no more than 3 number of redirections when given min  (verbal, visual, tactile) cues with 80%  Accuracy. Ongoing 6/10/20  2. The patient will demonstrate adequate attention to therapy tasks with no more than   two redirections in a 45-60 min session when given min cues with 80% accuracy. 3. The patient will demonstrate the ability to follow multi-step directions related to functional living  environment with 80%verbal, visual and tactile cues in order to increase functional integration  into environment. Ongoing 6/10/2020  4. The patient will demonstrate recall of functional information following a/an (immediate, shortterm,  long-term) delay with 75% accuracy with mod cues in order to increase functional integration into environment. Ongoing 6/10/2020  5. The patient will complete a mental manipulation task with 80% accuracy and min cueing in order  to increase functional integration into environment. Ongoing 6/10/2020  6. The patient will complete executive functioning skills such as money and medication management at Mod I 90% accuracy. Ongoing 6/10/2020  DISCHARGE GOALS: Time Frame: 3 months  Client will develop functional, cognitive-linguistic-based skills and utilize compensatory  strategies to communicate wants and needs effectively, maintain safety during ADLs and  participate socially in functional living environment  REHABILITATION POTENTIAL FOR STATED GOALS: [address]:  INTERVENTIONS PLANNED: (Benefits and precautions of therapy have been discussed with the patient.)  1. Speech therapy  TREATMENT PLAN EFFECTIVE DATES: 6/10/2020 TO 9/10/2020 (90 days). FREQUENCY/DURATION: Continue to follow patient 2 times a week for 90 days to address above goals. Regarding Russ Corrales's therapy, I certify that the treatment plan above will be carried out by a therapist or under their direction. Thank you for this referral,  DELVIN Espinoza Ed CCC-SLP                  Referring Physician Signature: Silvestre Gomez MD     Date      SUBJECTIVE:  Alert  Present Symptoms: Cognitive deficits  Pain Intensity 1: 0  Current Medications:   Current Outpatient Medications on File Prior to Encounter   Medication Sig Dispense Refill    simethicone (GAS-X MAXIMUM STRENGTH PO) Take  by mouth.  polyethylene glycol 3350 (MIRALAX PO) Take  by mouth.  COQ10, UBIQUINOL, PO Take  by mouth.  ALPHA LIPOIC ACID PO Take  by mouth.  acetylcarnitine HCl (ACETYL L-CARNITINE PO) Take  by mouth.       valsartan (Diovan) 80 mg tablet Take 1 Tab by mouth daily. 30 Tab 5    cloNIDine HCL (CATAPRES) 0.1 mg tablet Take 1 Tab by mouth daily as needed (take for BP greater than 160/90). 30 Tab 0    famotidine (PEPCID) 20 mg tablet Take 1 Tab by mouth two (2) times a day. 60 Tab 5    OTHER Indications: Tynelol 500 mg      dilTIAZem CD (CARDIZEM CD) 120 mg ER capsule Take 1 Cap by mouth daily. 30 Cap 5    magnesium oxide (MAG-OX) 400 mg tablet TAKE 1 TAB BY MOUTH TWO (2) TIMES A DAY. 120 Tab 3    meclizine (ANTIVERT) 25 mg tablet Take 1 Tab by mouth three (3) times daily as needed for Dizziness. 90 Tab 2    aspirin 81 mg chewable tablet Take 1 Tab by mouth daily. 30 Tab 0    atorvastatin (LIPITOR) 80 mg tablet Take 1 Tab by mouth nightly. 90 Tab 3    docusate sodium (STOOL SOFTENER) 100 mg tab Take  by mouth.  OTHER Organic Smooth Move for constipation      fluticasone propionate (FLONASE) 50 mcg/actuation nasal spray 2 Sprays by Both Nostrils route daily. 1 Bottle 5    triamcinolone acetonide (KENALOG) 0.025 % topical cream Apply  to affected area two (2) times a day. use thin layer      betamethasone dipropionate (DIPROLENE) 0.05 % ointment Apply  to affected area two (2) times a day.  fexofenadine (ALLEGRA) 180 mg tablet Take 180 mg by mouth daily as needed for Allergies.  hydrocortisone (HYTONE) 2.5 % topical cream Apply  to affected area two (2) times a day. use thin layer 30 g 0    cholecalciferol, vitamin D3, (VITAMIN D3) 2,000 unit tab Take  by mouth.  TURMERIC PO Take  by mouth.  folic acid-vit C3-UQC G84 2.2-25-1 mg tab Take  by mouth.  nystatin-triamcinolone (MYCOLOG II) topical cream Apply  to affected area two (2) times daily as needed for Skin Irritation. 30 g 1     No current facility-administered medications on file prior to encounter.          Date Last Reviewed: 7/22/2020  Social History/Home Situation:       Work/Activity History: Retired     OBJECTIVE:  Objective Measure: Tool Used: National Outcomes Measurement System: Functional Communication Measures: ATTENTION  Score:  Initial: 4 Most Recent: X (Date: -- )   Interpretation of Tool:  This measure describes the change in functional communication status subsequent to speech-language pathology treatment of patients who have attention deficits. o Level 1:  Attention is nonfunctional. The individual is generally unresponsive to most stimuli.  o Level 2: The individual can briefly attend with consistent maximal stimulation, but not long enough to complete even simple living tasks. o Level 3: The individual maintains attention over time to complete simple living tasks of short duration with consistent maximal cueing in the absence of distracting stimuli.  o Level 4: The individual maintains attention during simple living tasks of multiple steps and long duration within a minimally distracting environment with consistent minimal cueing. o Level 5: The individual maintains attention within simple living activities with occasional minimal cues within distracting environments. The individual requires increased cueing to start, continue, and change attention during complex activities. o Level 6: The individual maintains attention within complex activities, and can attend simultaneously to multiple demands with rare minimal cues. The individual usually uses compensatory strategies when encountering difficulty. The individual has mild difficulty or takes more than a reasonable amount of time to attend to multiple tasks/stimuli  o Level 7: The individuals ability to participate in vocational, a vocational or social activities is not limited by attentional abilities. Independent functioning may occasionally include the use of compensatory strategies.   Score Level 7 Level 6 Level 5 Level 4 Level 3 Level 2 Level 1   Modifier CH CI CJ CK CL CM CN       Tool Used: National Outcomes Measurement System: Functional Communication Measures: MEMORY  Score:  Initial: 4 Most Recent: X (Date: -- )   Interpretation of Tool: This measure describes the change in functional communication status subsequent to speech-language pathology treatment of patients who have memory deficits. o Level 1:  The individual is unable to recall any information, regardless of cueing. o Level 2: The individual consistently requires maximal verbal cues or uses external aids to recall personal information (e.g., family members, biographical information, physical location, etc.) in structured environments. o Level 3: The individual usually requires maximum cues to recall or use external aids for simple routine and personal information (e.g., schedule, names of familiar staff, location of therapy areas, etc.) in structured environments. o Level 4: The individual occasionally requires minimal cues to recall or use external memory aids for simple routine and personal information in structured environments. The individual requires consistent maximal cues to recall or use memory aids for complex and novel information (e.g., carry out multiple steps activities, accommodate schedule changes, anticipate meal times, etc.), plan and follow through on simple future events (e.g., use calendar to keep appointments, use log books to complete a single assignment/task, etc.) in structured environments. o Level 5 The individual consistently requires minimal cues to recall or use external memory aids for complex and novel information. The individual consistently requires minimal cues to plan and follow through on complex future events (e.g., menu planning and meal preparation, planning a party, etc.).  o Level 6: The individual is able to recall or use external aids/memory strategies for complex information and planning complex future events most of the time. When there is a breakdown in the use of recall/memory strategies/external memory aids, the individual occasionally requires minimal cues. These breakdowns may occasionally interfere with the individuals functioning in vocational, avocational, and social activities. o Level 7: The individual is successful and independent in recalling or using external aids/memory strategies for complex information and planning future events in all vocational, avocational, and social activities. Score Level 7 Level 6 Level 5 Level 4 Level 3 Level 2 Level 1   Modifier CH CI CJ CK CL CM CN     Oral Motor Structure/Speech:       SPEECH-LANGUAGE COGNITIVE EVALUATION  Tests Given: Ross Information Processing Assessment    Mental Status: Alert     Neuro-Linguistics:   1. Talked about recent memory events over the weekened independently 100% accuracy     2. Orientation  Month +  Date +  Year +    3. Visual inattention task (scanning 2 numbers):     1st trial: 0 errors  2nd trial: 0 errors  3rd trial: 0 errors  4th trial:  0 errors    4. Following oral and written directions at:   1st trial: Min A with 85% accuracy. Some comprehension issues but overall he has improved in this area. Pragmatics: WNL's    Cognitive Skills Activities: Activities/Procedures listed utilized to improve and/or restore cognitive function as related to memory and compensatory strategies for recall. Required moderate manual and   cueing to improve improve recall of information and perform graded activities focusing on attention skills. __________________________________________________________________________________________________  History of Present Injury/Illness (Reason for Referral): CVA   Treatment Assessment:  Evaluation completed. Progression/Medical Necessity:   · Patient is expected to demonstrate progress in cognitive ability to increase independence with activities of daily living. Compliance with Program/Exercises: Will assess as treatment progresses}.    Reason for Continuation of Services/Other Comments:  · Patient continues to require skilled intervention due to CVA.  Recommendations/Intent for next treatment session: \"Treatment next visit will focus on goals\". Total Treatment Duration:  Time In: 0845   Time Out: [address], Yessy Barrios. Tabitha Sanchez

## 2020-07-27 ENCOUNTER — HOSPITAL ENCOUNTER (OUTPATIENT)
Dept: PHYSICAL THERAPY | Age: 85
Discharge: HOME | End: 2020-07-27
Attending: INTERNAL MEDICINE
Payer: MEDICARE

## 2020-07-27 PROCEDURE — 92507 TX SP LANG VOICE COMM INDIV: CPT | Performed by: SPEECH-LANGUAGE PATHOLOGIST

## 2020-07-27 NOTE — PROGRESS NOTES
Mecca Reveles  : 1935  [place]  Secondary: [address] at [place] [id number] [facility], [address], [facility].  [phone]   [phone]         OUTPATIENT SPEECH LANGUAGE PATHOLOGY: Daily Note    ICD-10: Treatment Diagnosis: Cognitive Communication deficit R41.841   REFERRING PHYSICIAN: Kayden Galindo MD MD Orders: Evaluate and Treat  Return Physician Appointment: Unknown  PAST MEDICAL HISTORY:   Mr. Didi Pappas is a 81 y.o. male who  has a past medical history of Abdominal pain (2016), Acute prostatitis, Backache, unspecified, Calculus of kidney, Cancer ([facility] [address] Diabetes ([facility] [address] Diverticulosis (14), Edema (2016), Elevated prostate specific antigen (PSA), Essential hypertension, benign, GERD (gastroesophageal reflux disease), H. pylori infection (14), History of barium enema (3/18/14), History of BPH, Hypertension, Hypertrophy of prostate without urinary obstruction and other lower urinary tract symptoms (LUTS), Impotence of organic origin, Lumbago, Malignant neoplasm of prostate ([address] Microscopic hematuria, Multiple renal cysts (14), Osteopenia determined by x-ray, Other ill-defined conditions(799.89), PVCs (premature ventricular contractions) (2016), and SSS (sick sinus syndrome) ([facility] [address] (2016). He also  has a past surgical history that includes hx other surgical; hx hernia repair; hx radical prostatectomy; hx urological; hx tonsil and adenoidectomy; hx cholecystectomy; hx colonoscopy (); and hx endoscopy (2017). MEDICAL/REFERRING DIAGNOSIS: speech  DATE OF ONSET: 2019  PRIOR LEVEL OF FUNCTION: Independent ADL's  PRECAUTIONS/ALLERGIES: Seasonal allergies   ASSESSMENT:  Patient is an 81year old male who was referred for speech evaluation due to CVA. He suffered a CVA on 2019. He was admitted to [facility] and only stayed just two days and was discharged home.   Patient reports difficulty with memory recall. He does have some difficulty with names as well. He reports that he doesn't have issues with long term recall just short term. He reports that he was having some difficulty with his memory prior to his CVA. Wife reports that he's having some difficulty with reading, writing, and balancing checkbook. Patient used to read a lot and now he's not reading as much. He also endorses some difficulty with adapting to change especially if things change with how he checks his finances online etc.  He was independent with ADL's prior to his CVA. Patient is present this date. He completed cognitive task with minimal assist. He is impulsive during his tasks but otherwise he is doing well. Just need to cue him to slow down. Please see below for specifics. He's having increased difficulty with memory recall probably due to his age and CVA. We spent time going over strategies for memory recall especially for ADL's such as appointments etc.       Patient will benefit from skilled intervention to address the above impairments. ?????? ? ? This section established at most recent assessment??????????  PROBLEM LIST (Impairments causing functional limitations):  1. Cognitive decline  GOALS: (Goals have been discussed and agreed upon with patient.)  SHORT-TERM FUNCTIONAL GOALS: Time Frame: 12  1. The patient will complete a  (sustained, selective, alternating , divided) attention task with  no more than 3 number of redirections when given min  (verbal, visual, tactile) cues with 80%  Accuracy. Ongoing 6/10/20  2. The patient will demonstrate adequate attention to therapy tasks with no more than   two redirections in a 45-60 min session when given min cues with 80% accuracy. 3. The patient will demonstrate the ability to follow multi-step directions related to functional living  environment with 80%verbal, visual and tactile cues in order to increase functional integration  into environment. Ongoing 6/10/2020  4. The patient will demonstrate recall of functional information following a/an (immediate, shortterm,  long-term) delay with 75% accuracy with mod cues in order to increase functional integration into environment. Ongoing 6/10/2020  5. The patient will complete a mental manipulation task with 80% accuracy and min cueing in order  to increase functional integration into environment. Ongoing 6/10/2020  6. The patient will complete executive functioning skills such as money and medication management at Mod I 90% accuracy. Ongoing 6/10/2020  DISCHARGE GOALS: Time Frame: 3 months  Client will develop functional, cognitive-linguistic-based skills and utilize compensatory  strategies to communicate wants and needs effectively, maintain safety during ADLs and  participate socially in functional living environment  REHABILITATION POTENTIAL FOR STATED GOALS: [address]:  INTERVENTIONS PLANNED: (Benefits and precautions of therapy have been discussed with the patient.)  1. Speech therapy  TREATMENT PLAN EFFECTIVE DATES: 6/10/2020 TO 9/10/2020 (90 days). FREQUENCY/DURATION: Continue to follow patient 2 times a week for 90 days to address above goals. Regarding Oscar Corrales's therapy, I certify that the treatment plan above will be carried out by a therapist or under their direction. Thank you for this referral,  DELVIN Marques Ed CCC-SLP                  Referring Physician Signature: Carmencita Vasquez MD     Date      SUBJECTIVE:  Alert  Present Symptoms: Cognitive deficits  Pain Intensity 1: 0  Current Medications:   Current Outpatient Medications on File Prior to Encounter   Medication Sig Dispense Refill    simethicone (GAS-X MAXIMUM STRENGTH PO) Take  by mouth.  polyethylene glycol 3350 (MIRALAX PO) Take  by mouth.  COQ10, UBIQUINOL, PO Take  by mouth.  ALPHA LIPOIC ACID PO Take  by mouth.  acetylcarnitine HCl (ACETYL L-CARNITINE PO) Take  by mouth.       valsartan (Diovan) 80 mg tablet Take 1 Tab by mouth daily. 30 Tab 5    cloNIDine HCL (CATAPRES) 0.1 mg tablet Take 1 Tab by mouth daily as needed (take for BP greater than 160/90). 30 Tab 0    famotidine (PEPCID) 20 mg tablet Take 1 Tab by mouth two (2) times a day. 60 Tab 5    OTHER Indications: Tynelol 500 mg      dilTIAZem CD (CARDIZEM CD) 120 mg ER capsule Take 1 Cap by mouth daily. 30 Cap 5    magnesium oxide (MAG-OX) 400 mg tablet TAKE 1 TAB BY MOUTH TWO (2) TIMES A DAY. 120 Tab 3    meclizine (ANTIVERT) 25 mg tablet Take 1 Tab by mouth three (3) times daily as needed for Dizziness. 90 Tab 2    aspirin 81 mg chewable tablet Take 1 Tab by mouth daily. 30 Tab 0    atorvastatin (LIPITOR) 80 mg tablet Take 1 Tab by mouth nightly. 90 Tab 3    docusate sodium (STOOL SOFTENER) 100 mg tab Take  by mouth.  OTHER Organic Smooth Move for constipation      fluticasone propionate (FLONASE) 50 mcg/actuation nasal spray 2 Sprays by Both Nostrils route daily. 1 Bottle 5    triamcinolone acetonide (KENALOG) 0.025 % topical cream Apply  to affected area two (2) times a day. use thin layer      betamethasone dipropionate (DIPROLENE) 0.05 % ointment Apply  to affected area two (2) times a day.  fexofenadine (ALLEGRA) 180 mg tablet Take 180 mg by mouth daily as needed for Allergies.  hydrocortisone (HYTONE) 2.5 % topical cream Apply  to affected area two (2) times a day. use thin layer 30 g 0    cholecalciferol, vitamin D3, (VITAMIN D3) 2,000 unit tab Take  by mouth.  TURMERIC PO Take  by mouth.  folic acid-vit K2-MARCOS I26 2.2-25-1 mg tab Take  by mouth.  nystatin-triamcinolone (MYCOLOG II) topical cream Apply  to affected area two (2) times daily as needed for Skin Irritation. 30 g 1     No current facility-administered medications on file prior to encounter.          Date Last Reviewed: 7/27/2020  Social History/Home Situation:       Work/Activity History: Retired     OBJECTIVE:  Objective Measure: Tool Used: National Outcomes Measurement System: Functional Communication Measures: ATTENTION  Score:  Initial: 4 Most Recent: X (Date: -- )   Interpretation of Tool:  This measure describes the change in functional communication status subsequent to speech-language pathology treatment of patients who have attention deficits. o Level 1:  Attention is nonfunctional. The individual is generally unresponsive to most stimuli.  o Level 2: The individual can briefly attend with consistent maximal stimulation, but not long enough to complete even simple living tasks. o Level 3: The individual maintains attention over time to complete simple living tasks of short duration with consistent maximal cueing in the absence of distracting stimuli.  o Level 4: The individual maintains attention during simple living tasks of multiple steps and long duration within a minimally distracting environment with consistent minimal cueing. o Level 5: The individual maintains attention within simple living activities with occasional minimal cues within distracting environments. The individual requires increased cueing to start, continue, and change attention during complex activities. o Level 6: The individual maintains attention within complex activities, and can attend simultaneously to multiple demands with rare minimal cues. The individual usually uses compensatory strategies when encountering difficulty. The individual has mild difficulty or takes more than a reasonable amount of time to attend to multiple tasks/stimuli  o Level 7: The individuals ability to participate in vocational, a vocational or social activities is not limited by attentional abilities. Independent functioning may occasionally include the use of compensatory strategies.   Score Level 7 Level 6 Level 5 Level 4 Level 3 Level 2 Level 1   Modifier CH CI CJ CK CL CM CN       Tool Used: National Outcomes Measurement System: Functional Communication Measures: MEMORY  Score:  Initial: 4 Most Recent: X (Date: -- )   Interpretation of Tool: This measure describes the change in functional communication status subsequent to speech-language pathology treatment of patients who have memory deficits. o Level 1:  The individual is unable to recall any information, regardless of cueing. o Level 2: The individual consistently requires maximal verbal cues or uses external aids to recall personal information (e.g., family members, biographical information, physical location, etc.) in structured environments. o Level 3: The individual usually requires maximum cues to recall or use external aids for simple routine and personal information (e.g., schedule, names of familiar staff, location of therapy areas, etc.) in structured environments. o Level 4: The individual occasionally requires minimal cues to recall or use external memory aids for simple routine and personal information in structured environments. The individual requires consistent maximal cues to recall or use memory aids for complex and novel information (e.g., carry out multiple steps activities, accommodate schedule changes, anticipate meal times, etc.), plan and follow through on simple future events (e.g., use calendar to keep appointments, use log books to complete a single assignment/task, etc.) in structured environments. o Level 5 The individual consistently requires minimal cues to recall or use external memory aids for complex and novel information. The individual consistently requires minimal cues to plan and follow through on complex future events (e.g., menu planning and meal preparation, planning a party, etc.).  o Level 6: The individual is able to recall or use external aids/memory strategies for complex information and planning complex future events most of the time. When there is a breakdown in the use of recall/memory strategies/external memory aids, the individual occasionally requires minimal cues. These breakdowns may occasionally interfere with the individuals functioning in vocational, avocational, and social activities. o Level 7: The individual is successful and independent in recalling or using external aids/memory strategies for complex information and planning future events in all vocational, avocational, and social activities. Score Level 7 Level 6 Level 5 Level 4 Level 3 Level 2 Level 1   Modifier CH CI CJ CK CL CM CN     Oral Motor Structure/Speech:       SPEECH-LANGUAGE COGNITIVE EVALUATION  Tests Given: Ross Information Processing Assessment    Mental Status: Alert     Neuro-Linguistics:     1. Recalled recent events from weekend independently     2. Orientation questions:   Day of the week:   Month:   Year:   Date:     3. Completed following directions task at: (Map)  1st trial: Independently 100% accuracy   2nd trial: Independently at 100% accuracy    4. Memory recall (messages): Mod I 90% accuracy     5. Mental manipulation:   Immediate memory: Min A with 86% accuracy   Delayed recall: Mod I 95% accuracy   Pragmatics: WNL's    Cognitive Skills Activities: Activities/Procedures listed utilized to improve and/or restore cognitive function as related to memory and compensatory strategies for recall. Required moderate manual and   cueing to improve improve recall of information and perform graded activities focusing on attention skills. __________________________________________________________________________________________________  History of Present Injury/Illness (Reason for Referral): CVA   Treatment Assessment:  Evaluation completed. Progression/Medical Necessity:   · Patient is expected to demonstrate progress in cognitive ability to increase independence with activities of daily living. Compliance with Program/Exercises: Will assess as treatment progresses}.    Reason for Continuation of Services/Other Comments:  · Patient continues to require skilled intervention due to CVA.  Recommendations/Intent for next treatment session: \"Treatment next visit will focus on goals\". Total Treatment Duration:  Time In: 1100   Time Out: [address], [facility] List. Amy Ramirez

## 2020-07-28 ENCOUNTER — HOSPITAL ENCOUNTER (OUTPATIENT)
Dept: PHYSICAL THERAPY | Age: 85
Discharge: HOME | End: 2020-07-28
Attending: NURSE PRACTITIONER
Payer: MEDICARE

## 2020-07-28 DIAGNOSIS — R42 VERTIGO: ICD-10-CM

## 2020-07-28 PROCEDURE — 97112 NEUROMUSCULAR REEDUCATION: CPT

## 2020-07-28 PROCEDURE — 97162 PT EVAL MOD COMPLEX 30 MIN: CPT

## 2020-07-28 NOTE — PROGRESS NOTES
Veronica Barrios  : 1935  [place]  Secondary: [address] at [address] [facility], [address] [facility] [address], [address] [facility]  [phone]   [phone]     OUTPATIENT PHYSICAL THERAPY: Daily Treatment Note 2020  Visit Count:  1    ICD-10: Treatment Diagnosis: Difficulty in walking, not elsewhere classified (R26.2)  Precautions/Allergies:   Amiodarone; Benazepril; Hydrochlorothiazide; and Lisinopril   TREATMENT PLAN:  Effective Dates: 2020 TO 10/26/2020 (90 days). Frequency/Duration: 1-2 times a week for 90 Day(s)    Pre-treatment Symptoms/Complaints:  Vertigo, imbalance, and difficulty walking  Pain: Initial: Pain Intensity 1: 0  Post Session:  0/10   Medications Last Reviewed:  2020  Updated Objective Findings:  See evaluation note from today  TREATMENT:     NEUROMUSCULAR RE-EDUCATION: (10 minutes):  Exercise/activities per grid below to improve balance and vertigo. Required minimal verbal cues to promote correct body alignment. Date:  2020 Date:   Date:     Activity/Exercise Parameters Parameters Parameters   Walking with horizontal head turns 2 laps     Walking with vertical head turns 2 laps     Figure eights 2 reps     Circles right/circles left 2 reps                           Novogenie Portal  Treatment/Session Summary:    · Response to Treatment:  tolerated well. · Communication/Consultation:  None today  · Equipment provided today:  home exercise program  · Recommendations/Intent for next treatment session: Next visit will focus on balance exercises, gait, and habituation exercises.     Total Treatment Billable Duration:  10 minutes +evaluation  PT Patient Time In/Time Out  Time In: 1100  Time Out: Toro Desai PT    Future Appointments   Date Time Provider Lewis Slater   2020 11:00 AM [facility] ZOILA ALEMAN   2020  1:00 PM [facility] ZOILA ALEMAN 8/11/2020  1:45 PM Leoncio Wilson, PT [facility]   8/20/2020  1:45 PM [facility]   8/20/2020  2:30 PM Loco Wilson, PT [facility]   8/25/2020  1:00 PM [facility]   8/25/2020  1:45 PM Loco Wilson, PT [facility]   9/17/2020  3:00 PM Otilio Heredia MD [facility]   1/25/2021 11:00 AM Renee So MD [facility]   2/16/2021  4:10 PM [facility] DRAWS [facility] PGU   2/23/2021  3:00 PM Ramses Clark MD [facility]

## 2020-07-28 NOTE — THERAPY EVALUATION
Santa Arguello  : 1935  [place]  Secondary: [address] at [address], [address] [place] [address],[address] [place] [address].  [phone]   [phone]        OUTPATIENT PHYSICAL THERAPY:Initial Assessment 2020   ICD-10: Treatment Diagnosis: Difficulty in walking, not elsewhere classified (R26.2)  Precautions/Allergies:   Amiodarone; Benazepril; Hydrochlorothiazide; and Lisinopril   TREATMENT PLAN:  Effective Dates: 2020 TO 10/26/2020 (90 days). Frequency/Duration: 1-2 times a week for 90 Day(s) MEDICAL/REFERRING DIAGNOSIS:  Vertigo [R42]   DATE OF ONSET: Chronic   REFERRING PHYSICIAN: Peter Bacon NP MD Orders: Evaluate and treat   Return MD Appointment: unknown      INITIAL ASSESSMENT:  Mr. Malathi Grande presents with imbalance, vertigo, and difficulty walking. Patient is at higher fall risk based on score received on Dynamic Gait Index. Patient could have some right peripheral weakness contributing to his vertigo. Patient would benefit from skilled physical therapy to address problems and goals. Thank you for this referral.      PROBLEM LIST (Impacting functional limitations):  1. Decreased Ambulation Ability/Technique  2. Decreased Balance  3. Decreased [place] with Home Exercise Program  4. Increased vertigo INTERVENTIONS PLANNED: (Treatment may consist of any combination of the following)  1. Balance Exercise  2. Gait Training  3. Home Exercise Program (HEP)  4. Habituation exercises     GOALS: (Goals have been discussed and agreed upon with patient.)  Short-Term Functional Goals: Time Frame: 30 days   1. Patient will be independent with home exercise program to improve vertigo. Discharge Goals: Time Frame: 90 days   1. Patient will increase score on Dynamic Gait Index to greater than or equal to 22/24 demonstrating improvement. 2. Patient will report decreased dizziness associated with daily activities. OUTCOME MEASURE:   Tool Used: Dynamic Gait Index  Score:  Initial: 19/24 Most Recent: X/24 (Date: -- )   Interpretation of Score: Each section is scored on a 0-3 scale, 0 representing the patients inability to perform the task and 3 representing independence. The scores of each section are added together for a total score of 24. Any score below 19 indicates increased risk for falls. MEDICAL NECESSITY:   · Patient is expected to demonstrate progress in balance and vertigo to improve safety during activities of daily living. REASON FOR SERVICES/OTHER COMMENTS:  · Patient continues to require skilled intervention due to higher fall risk . Total Duration:  PT Patient Time In/Time Out  Time In: 1100  Time Out: 1150    Rehabilitation Potential For Stated Goals: Excellent  Regarding Clara Corrales's therapy, I certify that the treatment plan above will be carried out by a therapist or under their direction. Thank you for this referral,  James Snatana, PT     Referring Physician Signature: Jewels Gannon NP _______________________________ Date _____________     PAIN/SUBJECTIVE:   Initial: Pain Intensity 1: 0  Post Session:  0/10   HISTORY:   History of Injury/Illness (Reason for Referral):  Patient reports having vertigo off and on over the past 20 years. Patient reports recent sinus infection which required antibiotics. Patient rates current dizziness as 2/10. Aggravating factors are quick head and body movements. Patient has had no falls. Patient uses no assistive device for ambulation. Patient has a history of inner ear infections as a child and denies motion sensitivity. Patient reports some visual issues.    Past Medical History/Comorbidities:   Mr. Sunni Sánchez  has a past medical history of Abdominal pain (5/9/2016), Acute prostatitis, Backache, unspecified, Calculus of kidney, Cancer ([address] Diabetes ([address] Diverticulosis (1/14/14), Edema (5/9/2016), Elevated prostate specific antigen (PSA), Essential hypertension, benign, GERD (gastroesophageal reflux disease), H. pylori infection (12/1/14), History of barium enema (3/18/14), History of BPH, Hypertension, Hypertrophy of prostate without urinary obstruction and other lower urinary tract symptoms (LUTS), Impotence of organic origin, Lumbago, Malignant ne[facility] of prostate ([facility] Microscopic hematuria, Multiple renal cysts (1/14/14), Osteopenia determined by x-ray, Other ill-defined conditions(799.89), PVCs (premature ventricular contractions) (5/9/2016), and SSS (sick sinus syndrome) ([facility] (5/9/2016). Mr. Prasanth Tillman  has a past surgical history that includes hx other surgical; hx hernia repair; hx radical prostatectomy; hx urological; hx tonsil and adenoidectomy; hx cholecystectomy; hx colonoscopy (2010); and hx endoscopy (07/12/2017). Social History/Living Environment:     Lives with spouse. Prior Level of Function/Work/Activity:  Independent. Retired. Dominant Side:         RIGHT  Personal Factors:          Sex:  male        Age:  81 y.o. Ambulatory/Rehab Services H2 Model Falls Risk Assessment   Risk Factors:       (1)  Dizziness/Vertigo       (1)  Gender [Male] Ability to Rise from Chair:       (0)  Ability to rise in a single movement   Falls Prevention Plan:       No modifications necessary   Total: (5 or greater = High Risk): 2   ©2010 [facility] [employer]. All Rights Reserved. [facility] Patent #[id number].  Federal Law prohibits the replication, distribution or use without written permission from [facility] [employer]   Current Medications:       Current Outpatient Medications:     simethicone (GAS-X MAXIMUM STRENGTH PO), Take  by mouth., Disp: , Rfl:     polyethylene glycol 3350 (MIRALAX PO), Take  by mouth., Disp: , Rfl:     COQ10, UBIQUINOL, PO, Take  by mouth., Disp: , Rfl:     ALPHA LIPOIC ACID PO, Take  by mouth., Disp: , Rfl:     acetylcarnitine HCl (ACETYL L-CARNITINE PO), Take  by mouth., Disp: , Rfl:     valsartan (Diovan) 80 mg tablet, Take 1 Tab by mouth daily. , Disp: 30 Tab, Rfl: 5    cloNIDine HCL (CATAPRES) 0.1 mg tablet, Take 1 Tab by mouth daily as needed (take for BP greater than 160/90). , Disp: 30 Tab, Rfl: 0    famotidine (PEPCID) 20 mg tablet, Take 1 Tab by mouth two (2) times a day., Disp: 60 Tab, Rfl: 5    OTHER, Indications: Tynelol 500 mg, Disp: , Rfl:     dilTIAZem CD (CARDIZEM CD) 120 mg ER capsule, Take 1 Cap by mouth daily. , Disp: 30 Cap, Rfl: 5    magnesium oxide (MAG-OX) 400 mg tablet, TAKE 1 TAB BY MOUTH TWO (2) TIMES A DAY., Disp: 120 Tab, Rfl: 3    meclizine (ANTIVERT) 25 mg tablet, Take 1 Tab by mouth three (3) times daily as needed for Dizziness. , Disp: 90 Tab, Rfl: 2    aspirin 81 mg chewable tablet, Take 1 Tab by mouth daily. , Disp: 30 Tab, Rfl: 0    atorvastatin (LIPITOR) 80 mg tablet, Take 1 Tab by mouth nightly., Disp: 90 Tab, Rfl: 3    docusate sodium (STOOL SOFTENER) 100 mg tab, Take  by mouth., Disp: , Rfl:     OTHER, Organic Smooth Move for constipation, Disp: , Rfl:     fluticasone propionate (FLONASE) 50 mcg/actuation nasal spray, 2 Sprays by Both Nostrils route daily. , Disp: 1 Bottle, Rfl: 5    triamcinolone acetonide (KENALOG) 0.025 % topical cream, Apply  to affected area two (2) times a day. use thin layer, Disp: , Rfl:     betamethasone dipropionate (DIPROLENE) 0.05 % ointment, Apply  to affected area two (2) times a day., Disp: , Rfl:     fexofenadine (ALLEGRA) 180 mg tablet, Take 180 mg by mouth daily as needed for Allergies. , Disp: , Rfl:     hydrocortisone (HYTONE) 2.5 % topical cream, Apply  to affected area two (2) times a day.  use thin layer, Disp: 30 g, Rfl: 0    cholecalciferol, vitamin D3, (VITAMIN D3) 2,000 unit tab, Take  by mouth., Disp: , Rfl:     TURMERIC PO, Take  by mouth., Disp: , Rfl:     folic acid-vit P7-LCD D68 2.2-25-1 mg tab, Take  by mouth., Disp: , Rfl:     nystatin-triamcinolone (MYCOLOG II) topical cream, Apply  to affected area two (2) times daily as needed for Skin Irritation. , Disp: 30 g, Rfl: 1   Date Last Reviewed:  7/28/2020   Number of Personal Factors/Comorbidities that affect the Plan of Care: 1-2: MODERATE COMPLEXITY   EXAMINATION:   Functional Mobility:         Gait/Ambulation:  Patient ambulates without assistive device with normal gait pattern except with head and body turns. Postural Control & Balance:  · Meier Balance Scale:  Not tested. · Dynamic Gait Index:  19/24.   (A score less than or equal to19/24 is abnormal and predictive of falls)     · [employer] Sensory Organization Test:  Not tested. Oculomotor Exam:  · Eye Range of Motion:  full range of motion  · Cervical Range of Motion:   full range of motion  · Spontaneous nystagmus:  NO  · Gaze holding nystagmus:  NO  · Smooth Pursuit:      []Smooth Eye Movements    []Delayed Eye Movements     [x]Within Normal Limits     []Other(comment): · Voluntary Saccades:      []Smooth Eye Movements    []Delayed Eye Movements     [x]Within Normal Limits     []Other(comment):   Vestibular Ocular Reflex Testing:  · Dynamic Visual Acuity Test:  Not tested. · Head Thrust Test: Positive to the right. Negative to the left. Position Tests:  · Hallpike-Rankin testing presented as negative indicating that Benign Paroxysmal Positional Vertigo (BPPV) is absent bilaterally. Body Structures Involved:  1. Eyes and Ears  2. Muscles Body Functions Affected:  1. Neuromusculoskeletal Activities and Participation Affected:  1. General Tasks and Demands  2. Mobility  3.  Community, Social and [place]   Number of elements (examined above) that affect the Plan of Care: 4+: HIGH COMPLEXITY   CLINICAL PRESENTATION:   Presentation: Evolving clinical presentation with changing clinical characteristics: MODERATE COMPLEXITY   CLINICAL DECISION MAKING:   Use of outcome tool(s) and clinical judgement create a POC that gives a: Questionable prediction of patient's progress: MODERATE COMPLEXITY

## 2020-07-30 ENCOUNTER — HOSPITAL ENCOUNTER (OUTPATIENT)
Dept: PHYSICAL THERAPY | Age: 85
Discharge: HOME | End: 2020-07-30
Attending: INTERNAL MEDICINE
Payer: MEDICARE

## 2020-07-30 PROCEDURE — 92507 TX SP LANG VOICE COMM INDIV: CPT | Performed by: SPEECH-LANGUAGE PATHOLOGIST

## 2020-07-30 NOTE — PROGRESS NOTES
Sabi Garcia  : 1935  [place]  Secondary: [address] at [address], [address], [address] [place] [address]  [phone]   [phone]         OUTPATIENT SPEECH LANGUAGE PATHOLOGY: Daily Note    ICD-10: Treatment Diagnosis: Cognitive Communication deficit R41.841   REFERRING PHYSICIAN: Myrtle Mazariegos MD MD Orders: Evaluate and Treat  Return Physician Appointment: Unknown  PAST MEDICAL HISTORY:   Mr. Harmeet Park is a 81 y.o. male who  has a past medical history of Abdominal pain (2016), Acute prostatitis, Backache, unspecified, Calculus of kidney, Cancer ([facility] Diabetes ([facility] Diverticulosis (14), Edema (2016), Elevated prostate specific antigen (PSA), Essential hypertension, benign, GERD (gastroesophageal reflux disease), H. pylori infection (14), History of barium enema (3/18/14), History of BPH, Hypertension, Hypertrophy of prostate without urinary obstruction and other lower urinary tract symptoms (LUTS), Impotence of organic origin, Lumbago, Malignant neoplasm of prostate ([address] Microscopic hematuria, Multiple renal cysts (14), Osteopenia determined by x-ray, Other ill-defined conditions(799.89), PVCs (premature ventricular contractions) (2016), and SSS (sick sinus syndrome) ([facility] (2016). He also  has a past surgical history that includes hx other surgical; hx hernia repair; hx radical prostatectomy; hx urological; hx tonsil and adenoidectomy; hx cholecystectomy; hx colonoscopy (); and hx endoscopy (2017). MEDICAL/REFERRING DIAGNOSIS: speech  DATE OF ONSET: 2019  PRIOR LEVEL OF FUNCTION: Independent ADL's  PRECAUTIONS/ALLERGIES: Seasonal allergies   ASSESSMENT:  Patient is an 81year old male who was referred for speech evaluation due to CVA. He suffered a CVA on 2019. He was admitted to [facility] and only stayed just two days and was discharged home.   Patient reports difficulty with memory recall. He does have some difficulty with names as well. He reports that he doesn't have issues with long term recall just short term. He reports that he was having some difficulty with his memory prior to his CVA. Wife reports that he's having some difficulty with reading, writing, and balancing checkbook. Patient used to read a lot and now he's not reading as much. He also endorses some difficulty with adapting to change especially if things change with how he checks his finances online etc.  He was independent with ADL's prior to his CVA. Patient is present this date. He completed cognitive task with minimal assist. He is impulsive during his tasks but otherwise he is doing well. Just need to cue him to slow down. Please see below for specifics. He's having increased difficulty with memory recall probably due to his age and CVA. We spent time going over strategies for memory recall especially for ADL's such as appointments etc.       Patient will benefit from skilled intervention to address the above impairments. ?????? ? ? This section established at most recent assessment??????????  PROBLEM LIST (Impairments causing functional limitations):  1. Cognitive decline  GOALS: (Goals have been discussed and agreed upon with patient.)  SHORT-TERM FUNCTIONAL GOALS: Time Frame: 12  1. The patient will complete a  (sustained, selective, alternating , divided) attention task with  no more than 3 number of redirections when given min  (verbal, visual, tactile) cues with 80%  Accuracy. Ongoing 6/10/20  2. The patient will demonstrate adequate attention to therapy tasks with no more than   two redirections in a 45-60 min session when given min cues with 80% accuracy. 3. The patient will demonstrate the ability to follow multi-step directions related to functional living  environment with 80%verbal, visual and tactile cues in order to increase functional integration  into environment. Ongoing 6/10/2020  4. The patient will demonstrate recall of functional information following a/an (immediate, shortterm,  long-term) delay with 75% accuracy with mod cues in order to increase functional integration into environment. Ongoing 6/10/2020  5. The patient will complete a mental manipulation task with 80% accuracy and min cueing in order  to increase functional integration into environment. Ongoing 6/10/2020  6. The patient will complete executive functioning skills such as money and medication management at Mod I 90% accuracy. Ongoing 6/10/2020  DISCHARGE GOALS: Time Frame: 3 months  Client will develop functional, cognitive-linguistic-based skills and utilize compensatory  strategies to communicate wants and needs effectively, maintain safety during ADLs and  participate socially in functional living environment  REHABILITATION POTENTIAL FOR STATED GOALS: [address]:  INTERVENTIONS PLANNED: (Benefits and precautions of therapy have been discussed with the patient.)  1. Speech therapy  TREATMENT PLAN EFFECTIVE DATES: 6/10/2020 TO 9/10/2020 (90 days). FREQUENCY/DURATION: Continue to follow patient 2 times a week for 90 days to address above goals. Regarding Mila Corrales's therapy, I certify that the treatment plan above will be carried out by a therapist or under their direction. Thank you for this referral,  DELVIN Batista Ed CCC-SLP                  Referring Physician Signature: Michael Monroy MD     Date      SUBJECTIVE:  Alert  Present Symptoms: Cognitive deficits     Current Medications:   Current Outpatient Medications on File Prior to Encounter   Medication Sig Dispense Refill    simethicone (GAS-X MAXIMUM STRENGTH PO) Take  by mouth.  polyethylene glycol 3350 (MIRALAX PO) Take  by mouth.  COQ10, UBIQUINOL, PO Take  by mouth.  ALPHA LIPOIC ACID PO Take  by mouth.  acetylcarnitine HCl (ACETYL L-CARNITINE PO) Take  by mouth.       valsartan (Diovan) 80 mg tablet Take 1 Tab by mouth daily. 30 Tab 5    cloNIDine HCL (CATAPRES) 0.1 mg tablet Take 1 Tab by mouth daily as needed (take for BP greater than 160/90). 30 Tab 0    famotidine (PEPCID) 20 mg tablet Take 1 Tab by mouth two (2) times a day. 60 Tab 5    OTHER Indications: Tynelol 500 mg      dilTIAZem CD (CARDIZEM CD) 120 mg ER capsule Take 1 Cap by mouth daily. 30 Cap 5    magnesium oxide (MAG-OX) 400 mg tablet TAKE 1 TAB BY MOUTH TWO (2) TIMES A DAY. 120 Tab 3    meclizine (ANTIVERT) 25 mg tablet Take 1 Tab by mouth three (3) times daily as needed for Dizziness. 90 Tab 2    aspirin 81 mg chewable tablet Take 1 Tab by mouth daily. 30 Tab 0    atorvastatin (LIPITOR) 80 mg tablet Take 1 Tab by mouth nightly. 90 Tab 3    docusate sodium (STOOL SOFTENER) 100 mg tab Take  by mouth.  OTHER Organic Smooth Move for constipation      fluticasone propionate (FLONASE) 50 mcg/actuation nasal spray 2 Sprays by Both Nostrils route daily. 1 Bottle 5    triamcinolone acetonide (KENALOG) 0.025 % topical cream Apply  to affected area two (2) times a day. use thin layer      betamethasone dipropionate (DIPROLENE) 0.05 % ointment Apply  to affected area two (2) times a day.  fexofenadine (ALLEGRA) 180 mg tablet Take 180 mg by mouth daily as needed for Allergies.  hydrocortisone (HYTONE) 2.5 % topical cream Apply  to affected area two (2) times a day. use thin layer 30 g 0    cholecalciferol, vitamin D3, (VITAMIN D3) 2,000 unit tab Take  by mouth.  TURMERIC PO Take  by mouth.  folic acid-vit Y2-AZB I48 2.2-25-1 mg tab Take  by mouth.  nystatin-triamcinolone (MYCOLOG II) topical cream Apply  to affected area two (2) times daily as needed for Skin Irritation. 30 g 1     No current facility-administered medications on file prior to encounter. Date Last Reviewed: 7/30/2020  Social History/Home Situation:       Work/Activity History: Retired     OBJECTIVE:  Objective Measure:   Tool Used: National Outcomes Measurement System: Functional Communication Measures: ATTENTION  Score:  Initial: 4 Most Recent: X (Date: -- )   Interpretation of Tool:  This measure describes the change in functional communication status subsequent to speech-language pathology treatment of patients who have attention deficits. o Level 1:  Attention is nonfunctional. The individual is generally unresponsive to most stimuli.  o Level 2: The individual can briefly attend with consistent maximal stimulation, but not long enough to complete even simple living tasks. o Level 3: The individual maintains attention over time to complete simple living tasks of short duration with consistent maximal cueing in the absence of distracting stimuli.  o Level 4: The individual maintains attention during simple living tasks of multiple steps and long duration within a minimally distracting environment with consistent minimal cueing. o Level 5: The individual maintains attention within simple living activities with occasional minimal cues within distracting environments. The individual requires increased cueing to start, continue, and change attention during complex activities. o Level 6: The individual maintains attention within complex activities, and can attend simultaneously to multiple demands with rare minimal cues. The individual usually uses compensatory strategies when encountering difficulty. The individual has mild difficulty or takes more than a reasonable amount of time to attend to multiple tasks/stimuli  o Level 7: The individuals ability to participate in vocational, a vocational or social activities is not limited by attentional abilities. Independent functioning may occasionally include the use of compensatory strategies.   Score Level 7 Level 6 Level 5 Level 4 Level 3 Level 2 Level 1   Modifier CH CI CJ CK CL CM CN       Tool Used: National Outcomes Measurement System: Functional Communication Measures: MEMORY  Score: Initial: 4 Most Recent: X (Date: -- )   Interpretation of Tool: This measure describes the change in functional communication status subsequent to speech-language pathology treatment of patients who have memory deficits. o Level 1:  The individual is unable to recall any information, regardless of cueing. o Level 2: The individual consistently requires maximal verbal cues or uses external aids to recall personal information (e.g., family members, biographical information, physical location, etc.) in structured environments. o Level 3: The individual usually requires maximum cues to recall or use external aids for simple routine and personal information (e.g., schedule, names of familiar staff, location of therapy areas, etc.) in structured environments. o Level 4: The individual occasionally requires minimal cues to recall or use external memory aids for simple routine and personal information in structured environments. The individual requires consistent maximal cues to recall or use memory aids for complex and novel information (e.g., carry out multiple steps activities, accommodate schedule changes, anticipate meal times, etc.), plan and follow through on simple future events (e.g., use calendar to keep appointments, use log books to complete a single assignment/task, etc.) in structured environments. o Level 5 The individual consistently requires minimal cues to recall or use external memory aids for complex and novel information. The individual consistently requires minimal cues to plan and follow through on complex future events (e.g., menu planning and meal preparation, planning a party, etc.).  o Level 6: The individual is able to recall or use external aids/memory strategies for complex information and planning complex future events most of the time. When there is a breakdown in the use of recall/memory strategies/external memory aids, the individual occasionally requires minimal cues.   These breakdowns may occasionally interfere with the individuals functioning in vocational, avocational, and social activities. o Level 7: The individual is successful and independent in recalling or using external aids/memory strategies for complex information and planning future events in all vocational, avocational, and social activities. Score Level 7 Level 6 Level 5 Level 4 Level 3 Level 2 Level 1   Modifier CH CI CJ CK CL CM CN     Oral Motor Structure/Speech:       SPEECH-LANGUAGE COGNITIVE EVALUATION  Tests Given: Ross Information Processing Assessment    Mental Status: Alert     Neuro-Linguistics:     1. Recalled recent events from weekend independently     2. Orientation questions:   Day of the week: +   Month: +  Year: +  Date: +    3. Completed attention task at:   1st trial: 0 errors  2nd trial: 0 errors    4. Memory recall (messages): Mod I 90% accuracy     5. Mental manipulation:   Immediate memory: Min A with 86% accuracy   Delayed recall: Mod I 95% accuracy   Pragmatics: WNL's    Cognitive Skills Activities: Activities/Procedures listed utilized to improve and/or restore cognitive function as related to memory and compensatory strategies for recall. Required moderate manual and   cueing to improve improve recall of information and perform graded activities focusing on attention skills. __________________________________________________________________________________________________  History of Present Injury/Illness (Reason for Referral): CVA   Treatment Assessment:  Evaluation completed. Progression/Medical Necessity:   · Patient is expected to demonstrate progress in cognitive ability to increase independence with activities of daily living. Compliance with Program/Exercises: Will assess as treatment progresses}. Reason for Continuation of Services/Other Comments:  · Patient continues to require skilled intervention due to CVA.   Recommendations/Intent for next treatment session: \"Treatment next visit will focus on goals\". Total Treatment Duration:  Time In: 1110   Time Out: [address] [facility],[address], [facility]. Julia Steiner

## 2020-08-03 NOTE — PROGRESS NOTES
Ze Diamond  : 1935  [place]  Secondary: [address] at [facility]  [address] [facility], [address], [address] [facility]  [phone]   [phone]         OUTPATIENT SPEECH LANGUAGE PATHOLOGY: Daily Note    ICD-10: Treatment Diagnosis: Cognitive Communication deficit R41.841   REFERRING PHYSICIAN: Mirtha Sharp MD MD Orders: Evaluate and Treat  Return Physician Appointment: Unknown  PAST MEDICAL HISTORY:   Mr. Kristina Flynn is a 81 y.o. male who  has a past medical history of Abdominal pain (2016), Acute prostatitis, Backache, unspecified, Calculus of kidney, Cancer ([facility] [address] Diabetes ([facility] [address] Diverticulosis (14), Edema (2016), Elevated prostate specific antigen (PSA), Essential hypertension, benign, GERD (gastroesophageal reflux disease), H. pylori infection (14), History of barium enema (3/18/14), History of BPH, Hypertension, Hypertrophy of prostate without urinary obstruction and other lower urinary tract symptoms (LUTS), Impotence of organic origin, Lumbago, Malignant neoplasm of prostate ([address] Microscopic hematuria, Multiple renal cysts (14), Osteopenia determined by x-ray, Other ill-defined conditions(799.89), PVCs (premature ventricular contractions) (2016), and SSS (sick sinus syndrome) ([facility] [address] (2016). He also  has a past surgical history that includes hx other surgical; hx hernia repair; hx radical prostatectomy; hx urological; hx tonsil and adenoidectomy; hx cholecystectomy; hx colonoscopy (); and hx endoscopy (2017). MEDICAL/REFERRING DIAGNOSIS: speech  DATE OF ONSET: 2019  PRIOR LEVEL OF FUNCTION: Independent ADL's  PRECAUTIONS/ALLERGIES: Seasonal allergies   ASSESSMENT:  Patient is an 81year old male who was referred for speech evaluation due to CVA. He suffered a CVA on 2019. He was admitted to [facility] and only stayed just two days and was discharged home.   Patient reports difficulty with memory recall. He does have some difficulty with names as well. He reports that he doesn't have issues with long term recall just short term. He reports that he was having some difficulty with his memory prior to his CVA. Wife reports that he's having some difficulty with reading, writing, and balancing checkbook. Patient used to read a lot and now he's not reading as much. He also endorses some difficulty with adapting to change especially if things change with how he checks his finances online etc.  He was independent with ADL's prior to his CVA. Patient is present this date. He completed cognitive task with minimal assist. He is impulsive during his tasks but otherwise he is doing well. Just need to cue him to slow down. Please see below for specifics. He's having increased difficulty with memory recall probably due to his age and CVA. We spent time going over strategies for memory recall especially for ADL's such as appointments etc.       Patient will benefit from skilled intervention to address the above impairments. ?????? ? ? This section established at most recent assessment??????????  PROBLEM LIST (Impairments causing functional limitations):  1. Cognitive decline  GOALS: (Goals have been discussed and agreed upon with patient.)  SHORT-TERM FUNCTIONAL GOALS: Time Frame: 12  1. The patient will complete a  (sustained, selective, alternating , divided) attention task with  no more than 3 number of redirections when given min  (verbal, visual, tactile) cues with 80%  Accuracy. Ongoing 6/10/20  2. The patient will demonstrate adequate attention to therapy tasks with no more than   two redirections in a 45-60 min session when given min cues with 80% accuracy. 3. The patient will demonstrate the ability to follow multi-step directions related to functional living  environment with 80%verbal, visual and tactile cues in order to increase functional integration  into environment. Ongoing 6/10/2020  4. The patient will demonstrate recall of functional information following a/an (immediate, shortterm,  long-term) delay with 75% accuracy with mod cues in order to increase functional integration into environment. Ongoing 6/10/2020  5. The patient will complete a mental manipulation task with 80% accuracy and min cueing in order  to increase functional integration into environment. Ongoing 6/10/2020  6. The patient will complete executive functioning skills such as money and medication management at Mod I 90% accuracy. Ongoing 6/10/2020  DISCHARGE GOALS: Time Frame: 3 months  Client will develop functional, cognitive-linguistic-based skills and utilize compensatory  strategies to communicate wants and needs effectively, maintain safety during ADLs and  participate socially in functional living environment  REHABILITATION POTENTIAL FOR STATED GOALS: [address]:  INTERVENTIONS PLANNED: (Benefits and precautions of therapy have been discussed with the patient.)  1. Speech therapy  TREATMENT PLAN EFFECTIVE DATES: 6/10/2020 TO 9/10/2020 (90 days). FREQUENCY/DURATION: Continue to follow patient 2 times a week for 90 days to address above goals. Regarding Blanquita Corrales's therapy, I certify that the treatment plan above will be carried out by a therapist or under their direction. Thank you for this referral,  DELVIN Hoskins Ed CCC-SLP                  Referring Physician Signature: Cuba Coffey MD     Date      SUBJECTIVE:  Alert  Present Symptoms: Cognitive deficits  Pain Intensity 1: 0  Current Medications:   Current Outpatient Medications on File Prior to Encounter   Medication Sig Dispense Refill    simethicone (GAS-X MAXIMUM STRENGTH PO) Take  by mouth.  polyethylene glycol 3350 (MIRALAX PO) Take  by mouth.  COQ10, UBIQUINOL, PO Take  by mouth.  ALPHA LIPOIC ACID PO Take  by mouth.  acetylcarnitine HCl (ACETYL L-CARNITINE PO) Take  by mouth.       valsartan (Diovan) 80 mg tablet Take 1 Tab by mouth daily. 30 Tab 5    cloNIDine HCL (CATAPRES) 0.1 mg tablet Take 1 Tab by mouth daily as needed (take for BP greater than 160/90). 30 Tab 0    famotidine (PEPCID) 20 mg tablet Take 1 Tab by mouth two (2) times a day. 60 Tab 5    OTHER Indications: Tynelol 500 mg      dilTIAZem CD (CARDIZEM CD) 120 mg ER capsule Take 1 Cap by mouth daily. 30 Cap 5    magnesium oxide (MAG-OX) 400 mg tablet TAKE 1 TAB BY MOUTH TWO (2) TIMES A DAY. 120 Tab 3    meclizine (ANTIVERT) 25 mg tablet Take 1 Tab by mouth three (3) times daily as needed for Dizziness. 90 Tab 2    aspirin 81 mg chewable tablet Take 1 Tab by mouth daily. 30 Tab 0    atorvastatin (LIPITOR) 80 mg tablet Take 1 Tab by mouth nightly. 90 Tab 3    docusate sodium (STOOL SOFTENER) 100 mg tab Take  by mouth.  OTHER Organic Smooth Move for constipation      fluticasone propionate (FLONASE) 50 mcg/actuation nasal spray 2 Sprays by Both Nostrils route daily. 1 Bottle 5    triamcinolone acetonide (KENALOG) 0.025 % topical cream Apply  to affected area two (2) times a day. use thin layer      betamethasone dipropionate (DIPROLENE) 0.05 % ointment Apply  to affected area two (2) times a day.  fexofenadine (ALLEGRA) 180 mg tablet Take 180 mg by mouth daily as needed for Allergies.  hydrocortisone (HYTONE) 2.5 % topical cream Apply  to affected area two (2) times a day. use thin layer 30 g 0    cholecalciferol, vitamin D3, (VITAMIN D3) 2,000 unit tab Take  by mouth.  TURMERIC PO Take  by mouth.  folic acid-vit R7-JTA P65 2.2-25-1 mg tab Take  by mouth.  nystatin-triamcinolone (MYCOLOG II) topical cream Apply  to affected area two (2) times daily as needed for Skin Irritation. 30 g 1     No current facility-administered medications on file prior to encounter.          Date Last Reviewed: 8/4/2020    Social History/Home Situation:       Work/Activity History: Retired     OBJECTIVE:  Objective Measure: Tool Used: National Outcomes Measurement System: Functional Communication Measures: ATTENTION  Score:  Initial: 4 Most Recent: X (Date: -- )   Interpretation of Tool:  This measure describes the change in functional communication status subsequent to speech-language pathology treatment of patients who have attention deficits. o Level 1:  Attention is nonfunctional. The individual is generally unresponsive to most stimuli.  o Level 2: The individual can briefly attend with consistent maximal stimulation, but not long enough to complete even simple living tasks. o Level 3: The individual maintains attention over time to complete simple living tasks of short duration with consistent maximal cueing in the absence of distracting stimuli.  o Level 4: The individual maintains attention during simple living tasks of multiple steps and long duration within a minimally distracting environment with consistent minimal cueing. o Level 5: The individual maintains attention within simple living activities with occasional minimal cues within distracting environments. The individual requires increased cueing to start, continue, and change attention during complex activities. o Level 6: The individual maintains attention within complex activities, and can attend simultaneously to multiple demands with rare minimal cues. The individual usually uses compensatory strategies when encountering difficulty. The individual has mild difficulty or takes more than a reasonable amount of time to attend to multiple tasks/stimuli  o Level 7: The individuals ability to participate in vocational, a vocational or social activities is not limited by attentional abilities. Independent functioning may occasionally include the use of compensatory strategies.   Score Level 7 Level 6 Level 5 Level 4 Level 3 Level 2 Level 1   Modifier CH CI CJ CK CL CM CN       Tool Used: National Outcomes Measurement System: Functional Communication Measures: MEMORY  Score:  Initial: 4 Most Recent: X (Date: -- )   Interpretation of Tool: This measure describes the change in functional communication status subsequent to speech-language pathology treatment of patients who have memory deficits. o Level 1:  The individual is unable to recall any information, regardless of cueing. o Level 2: The individual consistently requires maximal verbal cues or uses external aids to recall personal information (e.g., family members, biographical information, physical location, etc.) in structured environments. o Level 3: The individual usually requires maximum cues to recall or use external aids for simple routine and personal information (e.g., schedule, names of familiar staff, location of therapy areas, etc.) in structured environments. o Level 4: The individual occasionally requires minimal cues to recall or use external memory aids for simple routine and personal information in structured environments. The individual requires consistent maximal cues to recall or use memory aids for complex and novel information (e.g., carry out multiple steps activities, accommodate schedule changes, anticipate meal times, etc.), plan and follow through on simple future events (e.g., use calendar to keep appointments, use log books to complete a single assignment/task, etc.) in structured environments. o Level 5 The individual consistently requires minimal cues to recall or use external memory aids for complex and novel information. The individual consistently requires minimal cues to plan and follow through on complex future events (e.g., menu planning and meal preparation, planning a party, etc.).  o Level 6: The individual is able to recall or use external aids/memory strategies for complex information and planning complex future events most of the time. When there is a breakdown in the use of recall/memory strategies/external memory aids, the individual occasionally requires minimal cues. These breakdowns may occasionally interfere with the individuals functioning in vocational, avocational, and social activities. o Level 7: The individual is successful and independent in recalling or using external aids/memory strategies for complex information and planning future events in all vocational, avocational, and social activities. Score Level 7 Level 6 Level 5 Level 4 Level 3 Level 2 Level 1   Modifier CH CI CJ CK CL CM CN     Oral Motor Structure/Speech:       SPEECH-LANGUAGE COGNITIVE EVALUATION  Tests Given: Ross Information Processing Assessment    Mental Status: Alert     Neuro-Linguistics:     1. Recalled recent events from weekend independently     2. Orientation questions:   Day of the week: +   Month: +  Year: +  Date: +    3. Completed attention task at:   1st trial: 0 errors  2nd trial: 0 errors    4. Memory recall with pictures  1st trial: Independently 100% accuracy   Recall of directions at Mod A with 70% accuracy  2nd trial: Mod A with 90% accuracy        5. Mental manipulation: Largest number   Single digits  1st trial: Mod I 90% accuracy  2nd trial: Independently 100% accuracy     Double digits: Independently 100% accuracy with the use of memory recall strategy      Pragmatics: WNL's    Cognitive Skills Activities: Activities/Procedures listed utilized to improve and/or restore cognitive function as related to memory and compensatory strategies for recall. Required moderate manual and   cueing to improve improve recall of information and perform graded activities focusing on attention skills. __________________________________________________________________________________________________  History of Present Injury/Illness (Reason for Referral): CVA   Treatment Assessment:  Evaluation completed. Progression/Medical Necessity:   · Patient is expected to demonstrate progress in cognitive ability to increase independence with activities of daily living.   Compliance with Program/Exercises: Will assess as treatment progresses}. Reason for Continuation of Services/Other Comments:  · Patient continues to require skilled intervention due to CVA. ·   Recommendations/Intent for next treatment session: \"Treatment next visit will focus on goals\". Total Treatment Duration:  Time In: 1345   Time Out: [address] [place], [place]. China Browne

## 2020-08-04 ENCOUNTER — HOSPITAL ENCOUNTER (OUTPATIENT)
Dept: PHYSICAL THERAPY | Age: 85
Discharge: HOME | End: 2020-08-04
Attending: INTERNAL MEDICINE
Payer: MEDICARE

## 2020-08-04 PROCEDURE — 92507 TX SP LANG VOICE COMM INDIV: CPT | Performed by: SPEECH-LANGUAGE PATHOLOGIST

## 2020-08-11 ENCOUNTER — HOSPITAL ENCOUNTER (OUTPATIENT)
Dept: PHYSICAL THERAPY | Age: 85
Discharge: HOME | End: 2020-08-11
Attending: NURSE PRACTITIONER
Payer: MEDICARE

## 2020-08-11 ENCOUNTER — HOSPITAL ENCOUNTER (OUTPATIENT)
Dept: PHYSICAL THERAPY | Age: 85
Discharge: HOME | End: 2020-08-11
Attending: INTERNAL MEDICINE
Payer: MEDICARE

## 2020-08-11 PROCEDURE — 92507 TX SP LANG VOICE COMM INDIV: CPT | Performed by: SPEECH-LANGUAGE PATHOLOGIST

## 2020-08-11 PROCEDURE — 97112 NEUROMUSCULAR REEDUCATION: CPT

## 2020-08-11 NOTE — PROGRESS NOTES
Isidro Sweeney  : 1935  [place]  Secondary: [address] at [facility]  [address] [facility], [address] [place] [address],[address], [address] [facility]  [phone]   [phone]     OUTPATIENT PHYSICAL THERAPY: Daily Treatment Note 2020  Visit Count:  3    ICD-10: Treatment Diagnosis: Difficulty in walking, not elsewhere classified (R26.2)  Precautions/Allergies:   Amiodarone; Benazepril; Hydrochlorothiazide; and Lisinopril   TREATMENT PLAN:  Effective Dates: 2020 TO 10/26/2020 (90 days). Frequency/Duration: 1-2 times a week for 90 Day(s)    Pre-treatment Symptoms/Complaints:  \"I am feeling much better since I have finished the antibiotics for my sinus infection. My dizziness is better\". Pain: Initial: Pain Intensity 1: 0 Post Session:  0/10   Medications Last Reviewed:  2020  Updated Objective Findings:  None Today  TREATMENT:     NEUROMUSCULAR RE-EDUCATION: (40 minutes):  Exercise/activities per grid below to improve balance and vertigo. Required minimal verbal cues to promote correct body alignment. Date:  2020 Date:  20 Date:     Activity/Exercise Parameters Parameters Parameters   Walking with horizontal head turns 2 laps 4 laps    Walking with vertical head turns 2 laps 4 laps    Figure eights 2 reps 2x2 reps     Circles right/circles left 2 reps 2x2 reps each way    Walking in and out of cones  4 laps    Marching in simon  4 laps    Turning 360s  2 circles     [place]   Eyes open with head turns; eyes closed    Tiltboard  Weight shifting left and right; weight shifting up and down. Eyes open with head turns; eyes closed              Kanari Portal  Treatment/Session Summary:    · Response to Treatment:  Patient reports slight increase in dizziness to 1/10 and slight headache after exercises.      · Communication/Consultation:  None today  · Equipment provided today:  None today  · Recommendations/Intent for next treatment session: Next visit will focus on balance exercises, gait, and habituation exercises.     Total Treatment Billable Duration:  40 minutes  PT Patient Time In/Time Out  Time In: 2762  Time Out: Luis Puentes PT    Future Appointments   Date Time Provider Lewis Nohemy   8/20/2020  1:45 PM [facility] S[facility]   8/20/2020  2:30 PM Verito Delgado, PT [facility]PT SFE   8/25/2020  7:00 PM Hayde Wilson, RAMESH [facility] SFE   8/28/2020 10:15 AM [facility]PT SFE   8/28/2020 11:00 AM Verito Delgado, PT SFEORPT SFE   9/17/2020  3:00 PM Carmencita Vasquez MD [facility] MAT BSCPC   1/25/2021 11:00 AM Ruperto Hurtado MD [facility] UCDG UCD   2/16/2021  4:10 PM PG[facility]LOOD DRAWS PGUMAU PGU   2/23/2021  3:00 PM Lilian Church MD [facility] PGU

## 2020-08-11 NOTE — PROGRESS NOTES
Nicole Cleaning  : 1935  [place]  Secondary: [address] at [address], [address], [address] [place] [address]  [phone]   [phone]         OUTPATIENT SPEECH LANGUAGE PATHOLOGY: Daily Note    ICD-10: Treatment Diagnosis: Cognitive Communication deficit R41.841   REFERRING PHYSICIAN: Silvestre Gomez MD MD Orders: Evaluate and Treat  Return Physician Appointment: Unknown  PAST MEDICAL HISTORY:   Mr. Yady Perez is a 81 y.o. male who  has a past medical history of Abdominal pain (2016), Acute prostatitis, Backache, unspecified, Calculus of kidney, Cancer ([facility] [address] Diabetes ([facility] [address] Diverticulosis (14), Edema (2016), Elevated prostate specific antigen (PSA), Essential hypertension, benign, GERD (gastroesophageal reflux disease), H. pylori infection (14), History of barium enema (3/18/14), History of BPH, Hypertension, Hypertrophy of prostate without urinary obstruction and other lower urinary tract symptoms (LUTS), Impotence of organic origin, Lumbago, Malignant neoplasm of prostate ([facility] [address] Microscopic hematuria, Multiple renal cysts (14), Osteopenia determined by x-ray, Other ill-defined conditions(799.89), PVCs (premature ventricular contractions) (2016), and SSS (sick sinus syndrome) ([facility] [address] (2016). He also  has a past surgical history that includes hx other surgical; hx hernia repair; hx radical prostatectomy; hx urological; hx tonsil and adenoidectomy; hx cholecystectomy; hx colonoscopy (); and hx endoscopy (2017). MEDICAL/REFERRING DIAGNOSIS: speech  DATE OF ONSET: 2019  PRIOR LEVEL OF FUNCTION: Independent ADL's  PRECAUTIONS/ALLERGIES: Seasonal allergies   ASSESSMENT:  Patient is an 81year old male who was referred for speech evaluation due to CVA. He suffered a CVA on 2019. He was admitted to [facility] and only stayed just two days and was discharged home.   Patient reports difficulty with memory recall. He does have some difficulty with names as well. He reports that he doesn't have issues with long term recall just short term. He reports that he was having some difficulty with his memory prior to his CVA. Wife reports that he's having some difficulty with reading, writing, and balancing checkbook. Patient used to read a lot and now he's not reading as much. He also endorses some difficulty with adapting to change especially if things change with how he checks his finances online etc.  He was independent with ADL's prior to his CVA. Patient is present this date. He completed cognitive task with minimal assist. He is impulsive during his tasks but otherwise he is doing well. Just need to cue him to slow down. Please see below for specifics. He's having increased difficulty with memory recall probably due to his age and CVA. We spent time going over strategies for memory recall especially for ADL's such as appointments etc.       Patient will benefit from skilled intervention to address the above impairments. ?????? ? ? This section established at most recent assessment??????????  PROBLEM LIST (Impairments causing functional limitations):  1. Cognitive decline  GOALS: (Goals have been discussed and agreed upon with patient.)  SHORT-TERM FUNCTIONAL GOALS: Time Frame: 12  1. The patient will complete a  (sustained, selective, alternating , divided) attention task with  no more than 3 number of redirections when given min  (verbal, visual, tactile) cues with 80%  Accuracy. Ongoing 6/10/20  2. The patient will demonstrate adequate attention to therapy tasks with no more than   two redirections in a 45-60 min session when given min cues with 80% accuracy. 3. The patient will demonstrate the ability to follow multi-step directions related to functional living  environment with 80%verbal, visual and tactile cues in order to increase functional integration  into environment. Ongoing 6/10/2020  4. The patient will demonstrate recall of functional information following a/an (immediate, shortterm,  long-term) delay with 75% accuracy with mod cues in order to increase functional integration into environment. Ongoing 6/10/2020  5. The patient will complete a mental manipulation task with 80% accuracy and min cueing in order  to increase functional integration into environment. Ongoing 6/10/2020  6. The patient will complete executive functioning skills such as money and medication management at Mod I 90% accuracy. Ongoing 6/10/2020  DISCHARGE GOALS: Time Frame: 3 months  Client will develop functional, cognitive-linguistic-based skills and utilize compensatory  strategies to communicate wants and needs effectively, maintain safety during ADLs and  participate socially in functional living environment  REHABILITATION POTENTIAL FOR STATED GOALS: [address]:  INTERVENTIONS PLANNED: (Benefits and precautions of therapy have been discussed with the patient.)  1. Speech therapy  TREATMENT PLAN EFFECTIVE DATES: 6/10/2020 TO 9/10/2020 (90 days). FREQUENCY/DURATION: Continue to follow patient 2 times a week for 90 days to address above goals. Regarding Wilson Eladia Witto's therapy, I certify that the treatment plan above will be carried out by a therapist or under their direction. Thank you for this referral,  DELVIN Bryant Ed CCC-SLP                  Referring Physician Signature: Ifeanyi Rodas MD     Date      SUBJECTIVE:  Alert  Present Symptoms: Cognitive deficits  Pain Intensity 1: 0  Current Medications:   Current Outpatient Medications on File Prior to Encounter   Medication Sig Dispense Refill    simethicone (GAS-X MAXIMUM STRENGTH PO) Take  by mouth.  polyethylene glycol 3350 (MIRALAX PO) Take  by mouth.  COQ10, UBIQUINOL, PO Take  by mouth.  ALPHA LIPOIC ACID PO Take  by mouth.  acetylcarnitine HCl (ACETYL L-CARNITINE PO) Take  by mouth.       valsartan (Diovan) 80 mg tablet Take 1 Tab by mouth daily. 30 Tab 5    cloNIDine HCL (CATAPRES) 0.1 mg tablet Take 1 Tab by mouth daily as needed (take for BP greater than 160/90). 30 Tab 0    famotidine (PEPCID) 20 mg tablet Take 1 Tab by mouth two (2) times a day. 60 Tab 5    OTHER Indications: Tynelol 500 mg      dilTIAZem CD (CARDIZEM CD) 120 mg ER capsule Take 1 Cap by mouth daily. 30 Cap 5    magnesium oxide (MAG-OX) 400 mg tablet TAKE 1 TAB BY MOUTH TWO (2) TIMES A DAY. 120 Tab 3    meclizine (ANTIVERT) 25 mg tablet Take 1 Tab by mouth three (3) times daily as needed for Dizziness. 90 Tab 2    aspirin 81 mg chewable tablet Take 1 Tab by mouth daily. 30 Tab 0    atorvastatin (LIPITOR) 80 mg tablet Take 1 Tab by mouth nightly. 90 Tab 3    docusate sodium (STOOL SOFTENER) 100 mg tab Take  by mouth.  OTHER Organic Smooth Move for constipation      fluticasone propionate (FLONASE) 50 mcg/actuation nasal spray 2 Sprays by Both Nostrils route daily. 1 Bottle 5    triamcinolone acetonide (KENALOG) 0.025 % topical cream Apply  to affected area two (2) times a day. use thin layer      betamethasone dipropionate (DIPROLENE) 0.05 % ointment Apply  to affected area two (2) times a day.  fexofenadine (ALLEGRA) 180 mg tablet Take 180 mg by mouth daily as needed for Allergies.  hydrocortisone (HYTONE) 2.5 % topical cream Apply  to affected area two (2) times a day. use thin layer 30 g 0    cholecalciferol, vitamin D3, (VITAMIN D3) 2,000 unit tab Take  by mouth.  TURMERIC PO Take  by mouth.  folic acid-vit A7-JLB Y35 2.2-25-1 mg tab Take  by mouth.  nystatin-triamcinolone (MYCOLOG II) topical cream Apply  to affected area two (2) times daily as needed for Skin Irritation. 30 g 1     No current facility-administered medications on file prior to encounter.          Date Last Reviewed: 8/4/2020    Social History/Home Situation:       Work/Activity History: Retired     OBJECTIVE:  Objective Measure: Tool Used: National Outcomes Measurement System: Functional Communication Measures: ATTENTION  Score:  Initial: 4 Most Recent: X (Date: -- )   Interpretation of Tool:  This measure describes the change in functional communication status subsequent to speech-language pathology treatment of patients who have attention deficits. o Level 1:  Attention is nonfunctional. The individual is generally unresponsive to most stimuli.  o Level 2: The individual can briefly attend with consistent maximal stimulation, but not long enough to complete even simple living tasks. o Level 3: The individual maintains attention over time to complete simple living tasks of short duration with consistent maximal cueing in the absence of distracting stimuli.  o Level 4: The individual maintains attention during simple living tasks of multiple steps and long duration within a minimally distracting environment with consistent minimal cueing. o Level 5: The individual maintains attention within simple living activities with occasional minimal cues within distracting environments. The individual requires increased cueing to start, continue, and change attention during complex activities. o Level 6: The individual maintains attention within complex activities, and can attend simultaneously to multiple demands with rare minimal cues. The individual usually uses compensatory strategies when encountering difficulty. The individual has mild difficulty or takes more than a reasonable amount of time to attend to multiple tasks/stimuli  o Level 7: The individuals ability to participate in vocational, a vocational or social activities is not limited by attentional abilities. Independent functioning may occasionally include the use of compensatory strategies.   Score Level 7 Level 6 Level 5 Level 4 Level 3 Level 2 Level 1   Modifier CH CI CJ CK CL CM CN       Tool Used: National Outcomes Measurement System: Functional Communication Measures: MEMORY  Score:  Initial: 4 Most Recent: X (Date: -- )   Interpretation of Tool: This measure describes the change in functional communication status subsequent to speech-language pathology treatment of patients who have memory deficits. o Level 1:  The individual is unable to recall any information, regardless of cueing. o Level 2: The individual consistently requires maximal verbal cues or uses external aids to recall personal information (e.g., family members, biographical information, physical location, etc.) in structured environments. o Level 3: The individual usually requires maximum cues to recall or use external aids for simple routine and personal information (e.g., schedule, names of familiar staff, location of therapy areas, etc.) in structured environments. o Level 4: The individual occasionally requires minimal cues to recall or use external memory aids for simple routine and personal information in structured environments. The individual requires consistent maximal cues to recall or use memory aids for complex and novel information (e.g., carry out multiple steps activities, accommodate schedule changes, anticipate meal times, etc.), plan and follow through on simple future events (e.g., use calendar to keep appointments, use log books to complete a single assignment/task, etc.) in structured environments. o Level 5 The individual consistently requires minimal cues to recall or use external memory aids for complex and novel information. The individual consistently requires minimal cues to plan and follow through on complex future events (e.g., menu planning and meal preparation, planning a party, etc.).  o Level 6: The individual is able to recall or use external aids/memory strategies for complex information and planning complex future events most of the time. When there is a breakdown in the use of recall/memory strategies/external memory aids, the individual occasionally requires minimal cues. These breakdowns may occasionally interfere with the individuals functioning in vocational, avocational, and social activities. o Level 7: The individual is successful and independent in recalling or using external aids/memory strategies for complex information and planning future events in all vocational, avocational, and social activities. Score Level 7 Level 6 Level 5 Level 4 Level 3 Level 2 Level 1   Modifier CH CI CJ CK CL CM CN     Oral Motor Structure/Speech:       SPEECH-LANGUAGE COGNITIVE EVALUATION  Tests Given: Ross Information Processing Assessment    Mental Status: Alert     Neuro-Linguistics:     1. Recalled recent events from weekend independently     2. Orientation questions:   Day of the week: +   Month: +  Year: +  Date: +    3. Completed following directions tasks 3 step 6 components  1st trial: Independently 100% accuracy  Delayed recall of directions at: Min to Mod A 75% accuracy    4. Mental manipulation tasks (largest number)   2, 3 digit recall: Independently 100% accuracy       Pragmatics: WNL's    Cognitive Skills Activities: Activities/Procedures listed utilized to improve and/or restore cognitive function as related to memory and compensatory strategies for recall. Required moderate manual and   cueing to improve improve recall of information and perform graded activities focusing on attention skills. __________________________________________________________________________________________________  History of Present Injury/Illness (Reason for Referral): CVA   Treatment Assessment:  Evaluation completed. Progression/Medical Necessity:   · Patient is expected to demonstrate progress in cognitive ability to increase independence with activities of daily living. Compliance with Program/Exercises: Will assess as treatment progresses}. Reason for Continuation of Services/Other Comments:  · Patient continues to require skilled intervention due to CVA.    ·   Recommendations/Intent for next treatment session: \"Treatment next visit will focus on goals\". Total Treatment Duration:  Time In: 1300   Time Out: Do Gibson1, Sharlene Curran. Maggy Campbell

## 2020-08-20 ENCOUNTER — HOSPITAL ENCOUNTER (OUTPATIENT)
Dept: PHYSICAL THERAPY | Age: 85
Discharge: HOME | End: 2020-08-20
Attending: INTERNAL MEDICINE
Payer: MEDICARE

## 2020-08-20 ENCOUNTER — HOSPITAL ENCOUNTER (OUTPATIENT)
Dept: PHYSICAL THERAPY | Age: 85
Discharge: HOME | End: 2020-08-20
Attending: NURSE PRACTITIONER
Payer: MEDICARE

## 2020-08-20 PROCEDURE — 92507 TX SP LANG VOICE COMM INDIV: CPT | Performed by: SPEECH-LANGUAGE PATHOLOGIST

## 2020-08-20 PROCEDURE — 97112 NEUROMUSCULAR REEDUCATION: CPT

## 2020-08-20 NOTE — PROGRESS NOTES
Nancy Obregon  : 1935  [place]  Secondary: [address] at [facility]  [address] [facility], [address] [place] [address],[address], [address] [facility]  [phone]   [phone]     OUTPATIENT PHYSICAL THERAPY: Daily Treatment Note 2020  Visit Count:  5    ICD-10: Treatment Diagnosis: Difficulty in walking, not elsewhere classified (R26.2)  Precautions/Allergies:   Amiodarone; Benazepril; Hydrochlorothiazide; and Lisinopril   TREATMENT PLAN:  Effective Dates: 2020 TO 10/26/2020 (90 days). Frequency/Duration: 1-2 times a week for 90 Day(s)    Pre-treatment Symptoms/Complaints:  \"I am doing okay. Dizziness is around 1/10\". Pain: Initial: Pain Intensity 1: 0 Post Session:  0/10   Medications Last Reviewed:  2020  Updated Objective Findings:  None Today  TREATMENT:     NEUROMUSCULAR RE-EDUCATION: (40 minutes):  Exercise/activities per grid below to improve balance and vertigo. Required minimal verbal cues to promote correct body alignment. Date:  2020 Date:  20 Date:  20   Activity/Exercise Parameters Parameters Parameters   Walking with horizontal head turns 2 laps 4 laps 4 laps   Walking with vertical head turns 2 laps 4 laps 4 laps   Figure eights 2 reps 2x2 reps  2x3 reps each way   Circles right/circles left 2 reps 2x2 reps each way 2x3 reps each way   Walking in and out of cones  4 laps 4 laps   Marching in simon  4 laps 4 laps   Turning 360s  2 circles  2 circles    [place]   Eyes open with head turns; eyes closed Eyes open with head turns; eyes closed   Tiltboard  Weight shifting left and right; weight shifting up and down. Eyes open with head turns eyes closed Weight shifting left and right; weight shifting up and down.   Eyes open and eyes closed   Semi tandem stance   Blue foam eyes open and eyes closed   Walking with diagonal head turns   4 laps       [employer]  Treatment/Session Summary:    · Response to Treatment: Patient reports slight increase in dizziness to 3/10 after exercises. · Communication/Consultation:  None today  · Equipment provided today:  None today  · Recommendations/Intent for next treatment session: Next visit will focus on balance exercises, gait, and habituation exercises.     Total Treatment Billable Duration:  40 minutes  PT Patient Time In/Time Out  Time In: 1430  Time Out: [address], PT    Future Appointments   Date Time Provider Lewis Slater   8[facility]25/2020  7[facility]   8/28/2020 10:15 AM [facility] S[facility]PT E   8/28/2020 11:00 AM Gregg Bay, PT SFEORPT E   9/17/2020  3:00 PM Julianna Ramos MD [facility] MAT BSC[facility]   1/25/2021 11:00 AM Kasey Blackwood MD [facility] UCDG UCD   2/16/2021  4:10 PM [facility] BLOOD DRAWS PGUMAU PGU   2/23/2021  3:00 PM Pamella Reyes MD [facility] PGU

## 2020-08-20 NOTE — PROGRESS NOTES
Yair Taylor  : 1935  [place]  Secondary: [address] at [address] [facility]  [address], [address], [facility] [address]  [phone]   [phone]         OUTPATIENT SPEECH LANGUAGE PATHOLOGY: Daily Note    ICD-10: Treatment Diagnosis: Cognitive Communication deficit R41.841   REFERRING PHYSICIAN: Renea Guerrero MD MD Orders: Evaluate and Treat  Return Physician Appointment: Unknown  PAST MEDICAL HISTORY:   Mr. Mead Citizen is a 81 y.o. male who  has a past medical history of Abdominal pain (2016), Acute prostatitis, Backache, unspecified, Calculus of kidney, Cancer ([facility] [address] [facility] Diabetes ([facility] [address] [facility] Diverticulosis (14), Edema (2016), Elevated prostate specific antigen (PSA), Essential hypertension, benign, GERD (gastroesophageal reflux disease), H. pylori infection (14), History of barium enema (3/18/14), History of BPH, Hypertension, Hypertrophy of prostate without urinary obstruction and other lower urinary tract symptoms (LUTS), Impotence of organic origin, Lumbago, Malignant neoplasm of prostate ([address] Microscopic hematuria, Multiple renal cysts (14), Osteopenia determined by x-ray, Other ill-defined conditions(799.89), PVCs (premature ventricular contractions) (2016), and SSS (sick sinus syndrome) ([facility] [address] (2016). He also  has a past surgical history that includes hx other surgical; hx hernia repair; hx radical prostatectomy; hx urological; hx tonsil and adenoidectomy; hx cholecystectomy; hx colonoscopy (); and hx endoscopy (2017). MEDICAL/REFERRING DIAGNOSIS: speech  DATE OF ONSET: 2019  PRIOR LEVEL OF FUNCTION: Independent ADL's  PRECAUTIONS/ALLERGIES: Seasonal allergies   ASSESSMENT:  Patient is an 81year old male who was referred for speech evaluation due to CVA. He suffered a CVA on 2019. He was admitted to [facility] and only stayed just two days and was discharged home.   Patient reports difficulty with memory recall. He does have some difficulty with names as well. He reports that he doesn't have issues with long term recall just short term. He reports that he was having some difficulty with his memory prior to his CVA. Wife reports that he's having some difficulty with reading, writing, and balancing checkbook. Patient used to read a lot and now he's not reading as much. He also endorses some difficulty with adapting to change especially if things change with how he checks his finances online etc.  He was independent with ADL's prior to his CVA. Patient is present this date. He completed cognitive task with minimal assist. He is impulsive during his tasks but otherwise he is doing well. Just need to cue him to slow down. Please see below for specifics. He's having increased difficulty with memory recall probably due to his age and CVA. We spent time going over strategies for memory recall especially for ADL's such as appointments etc.       Patient will benefit from skilled intervention to address the above impairments. ?????? ? ? This section established at most recent assessment??????????  PROBLEM LIST (Impairments causing functional limitations):  1. Cognitive decline  GOALS: (Goals have been discussed and agreed upon with patient.)  SHORT-TERM FUNCTIONAL GOALS: Time Frame: 12  1. The patient will complete a  (sustained, selective, alternating , divided) attention task with  no more than 3 number of redirections when given min  (verbal, visual, tactile) cues with 80%  Accuracy. Ongoing 6/10/20  2. The patient will demonstrate adequate attention to therapy tasks with no more than   two redirections in a 45-60 min session when given min cues with 80% accuracy. 3. The patient will demonstrate the ability to follow multi-step directions related to functional living  environment with 80%verbal, visual and tactile cues in order to increase functional integration  into environment. Ongoing 6/10/2020  4. The patient will demonstrate recall of functional information following a/an (immediate, shortterm,  long-term) delay with 75% accuracy with mod cues in order to increase functional integration into environment. Ongoing 6/10/2020  5. The patient will complete a mental manipulation task with 80% accuracy and min cueing in order  to increase functional integration into environment. Ongoing 6/10/2020  6. The patient will complete executive functioning skills such as money and medication management at Mod I 90% accuracy. Ongoing 6/10/2020  DISCHARGE GOALS: Time Frame: 3 months  Client will develop functional, cognitive-linguistic-based skills and utilize compensatory  strategies to communicate wants and needs effectively, maintain safety during ADLs and  participate socially in functional living environment  REHABILITATION POTENTIAL FOR STATED GOALS: [address]:  INTERVENTIONS PLANNED: (Benefits and precautions of therapy have been discussed with the patient.)  1. Speech therapy  TREATMENT PLAN EFFECTIVE DATES: 6/10/2020 TO 9/10/2020 (90 days). FREQUENCY/DURATION: Continue to follow patient 2 times a week for 90 days to address above goals. Regarding Abdiel Corrales's therapy, I certify that the treatment plan above will be carried out by a therapist or under their direction. Thank you for this referral,  DELVIN Steele Ed CCC-SLP                  Referring Physician Signature: Katie Terry MD     Date      SUBJECTIVE:  Alert  Present Symptoms: Cognitive deficits  Pain Intensity 1: 0  Current Medications:   Current Outpatient Medications on File Prior to Encounter   Medication Sig Dispense Refill    simethicone (GAS-X MAXIMUM STRENGTH PO) Take  by mouth.  polyethylene glycol 3350 (MIRALAX PO) Take  by mouth.  COQ10, UBIQUINOL, PO Take  by mouth.  ALPHA LIPOIC ACID PO Take  by mouth.  acetylcarnitine HCl (ACETYL L-CARNITINE PO) Take  by mouth.       valsartan (Diovan) 80 mg tablet Take 1 Tab by mouth daily. 30 Tab 5    cloNIDine HCL (CATAPRES) 0.1 mg tablet Take 1 Tab by mouth daily as needed (take for BP greater than 160/90). 30 Tab 0    famotidine (PEPCID) 20 mg tablet Take 1 Tab by mouth two (2) times a day. 60 Tab 5    OTHER Indications: Tynelol 500 mg      dilTIAZem CD (CARDIZEM CD) 120 mg ER capsule Take 1 Cap by mouth daily. 30 Cap 5    magnesium oxide (MAG-OX) 400 mg tablet TAKE 1 TAB BY MOUTH TWO (2) TIMES A DAY. 120 Tab 3    meclizine (ANTIVERT) 25 mg tablet Take 1 Tab by mouth three (3) times daily as needed for Dizziness. 90 Tab 2    aspirin 81 mg chewable tablet Take 1 Tab by mouth daily. 30 Tab 0    atorvastatin (LIPITOR) 80 mg tablet Take 1 Tab by mouth nightly. 90 Tab 3    docusate sodium (STOOL SOFTENER) 100 mg tab Take  by mouth.  OTHER Organic Smooth Move for constipation      fluticasone propionate (FLONASE) 50 mcg/actuation nasal spray 2 Sprays by Both Nostrils route daily. 1 Bottle 5    triamcinolone acetonide (KENALOG) 0.025 % topical cream Apply  to affected area two (2) times a day. use thin layer      betamethasone dipropionate (DIPROLENE) 0.05 % ointment Apply  to affected area two (2) times a day.  fexofenadine (ALLEGRA) 180 mg tablet Take 180 mg by mouth daily as needed for Allergies.  hydrocortisone (HYTONE) 2.5 % topical cream Apply  to affected area two (2) times a day. use thin layer 30 g 0    cholecalciferol, vitamin D3, (VITAMIN D3) 2,000 unit tab Take  by mouth.  TURMERIC PO Take  by mouth.  folic acid-vit E5-YDA Z33 2.2-25-1 mg tab Take  by mouth.  nystatin-triamcinolone (MYCOLOG II) topical cream Apply  to affected area two (2) times daily as needed for Skin Irritation. 30 g 1     No current facility-administered medications on file prior to encounter.          Date Last Reviewed: 8/20/2020    Social History/Home Situation:       Work/Activity History: Retired     OBJECTIVE:  Objective Measure: Tool Used: National Outcomes Measurement System: Functional Communication Measures: ATTENTION  Score:  Initial: 4 Most Recent: X (Date: -- )   Interpretation of Tool:  This measure describes the change in functional communication status subsequent to speech-language pathology treatment of patients who have attention deficits. o Level 1:  Attention is nonfunctional. The individual is generally unresponsive to most stimuli.  o Level 2: The individual can briefly attend with consistent maximal stimulation, but not long enough to complete even simple living tasks. o Level 3: The individual maintains attention over time to complete simple living tasks of short duration with consistent maximal cueing in the absence of distracting stimuli.  o Level 4: The individual maintains attention during simple living tasks of multiple steps and long duration within a minimally distracting environment with consistent minimal cueing. o Level 5: The individual maintains attention within simple living activities with occasional minimal cues within distracting environments. The individual requires increased cueing to start, continue, and change attention during complex activities. o Level 6: The individual maintains attention within complex activities, and can attend simultaneously to multiple demands with rare minimal cues. The individual usually uses compensatory strategies when encountering difficulty. The individual has mild difficulty or takes more than a reasonable amount of time to attend to multiple tasks/stimuli  o Level 7: The individuals ability to participate in vocational, a vocational or social activities is not limited by attentional abilities. Independent functioning may occasionally include the use of compensatory strategies.   Score Level 7 Level 6 Level 5 Level 4 Level 3 Level 2 Level 1   Modifier CH CI CJ CK CL CM CN       Tool Used: National Outcomes Measurement System: Functional Communication Measures: MEMORY  Score:  Initial: 4 Most Recent: X (Date: -- )   Interpretation of Tool: This measure describes the change in functional communication status subsequent to speech-language pathology treatment of patients who have memory deficits. o Level 1:  The individual is unable to recall any information, regardless of cueing. o Level 2: The individual consistently requires maximal verbal cues or uses external aids to recall personal information (e.g., family members, biographical information, physical location, etc.) in structured environments. o Level 3: The individual usually requires maximum cues to recall or use external aids for simple routine and personal information (e.g., schedule, names of familiar staff, location of therapy areas, etc.) in structured environments. o Level 4: The individual occasionally requires minimal cues to recall or use external memory aids for simple routine and personal information in structured environments. The individual requires consistent maximal cues to recall or use memory aids for complex and novel information (e.g., carry out multiple steps activities, accommodate schedule changes, anticipate meal times, etc.), plan and follow through on simple future events (e.g., use calendar to keep appointments, use log books to complete a single assignment/task, etc.) in structured environments. o Level 5 The individual consistently requires minimal cues to recall or use external memory aids for complex and novel information. The individual consistently requires minimal cues to plan and follow through on complex future events (e.g., menu planning and meal preparation, planning a party, etc.).  o Level 6: The individual is able to recall or use external aids/memory strategies for complex information and planning complex future events most of the time. When there is a breakdown in the use of recall/memory strategies/external memory aids, the individual occasionally requires minimal cues. These breakdowns may occasionally interfere with the individuals functioning in vocational, avocational, and social activities. o Level 7: The individual is successful and independent in recalling or using external aids/memory strategies for complex information and planning future events in all vocational, avocational, and social activities. Score Level 7 Level 6 Level 5 Level 4 Level 3 Level 2 Level 1   Modifier CH CI CJ CK CL CM CN     Oral Motor Structure/Speech:       SPEECH-LANGUAGE COGNITIVE EVALUATION  Tests Given: Ross Information Processing Assessment    Mental Status: Alert     Neuro-Linguistics:     1. Recalled recent events from weekend independently     2. Orientation questions:   Day of the week: +   Month: +  Year: +  Date: +    3. Recalled stroke facts at min to mod A due to decreased attention and recall    Pragmatics: WNL's    Cognitive Skills Activities: Activities/Procedures listed utilized to improve and/or restore cognitive function as related to memory and compensatory strategies for recall. Required moderate manual and   cueing to improve improve recall of information and perform graded activities focusing on attention skills. __________________________________________________________________________________________________  History of Present Injury/Illness (Reason for Referral): CVA   Treatment Assessment:  Evaluation completed. Progression/Medical Necessity:   · Patient is expected to demonstrate progress in cognitive ability to increase independence with activities of daily living. Compliance with Program/Exercises: Will assess as treatment progresses}. Reason for Continuation of Services/Other Comments:  · Patient continues to require skilled intervention due to CVA. ·   Recommendations/Intent for next treatment session: \"Treatment next visit will focus on goals\". Total Treatment Duration:  Time In: 1345   Time Out: 21802  Irwin Eddy

## 2020-08-25 ENCOUNTER — APPOINTMENT (OUTPATIENT)
Dept: PHYSICAL THERAPY | Age: 85
End: 2020-08-25
Attending: INTERNAL MEDICINE
Payer: MEDICARE

## 2020-08-25 ENCOUNTER — HOSPITAL ENCOUNTER (OUTPATIENT)
Dept: PHYSICAL THERAPY | Age: 85
Discharge: HOME | End: 2020-08-25
Attending: NURSE PRACTITIONER
Payer: MEDICARE

## 2020-08-25 NOTE — PROGRESS NOTES
Santa Arguello  : 1935  [place]  Secondary: [address] at [address] [facility], [address] [facility] [address], [place] [address]  [phone]   [phone]     OUTPATIENT DAILY NOTE    NAME/AGE/GENDER: Santa Arguello is a 81 y.o. male. DATE: 2020    Mr. Corrales CANCELED for today's appointment due to unknown reason.     Calvin Hicks, PT

## 2020-08-28 ENCOUNTER — HOSPITAL ENCOUNTER (OUTPATIENT)
Dept: PHYSICAL THERAPY | Age: 85
Discharge: HOME | End: 2020-08-28
Attending: INTERNAL MEDICINE
Payer: MEDICARE

## 2020-08-28 ENCOUNTER — HOSPITAL ENCOUNTER (OUTPATIENT)
Dept: PHYSICAL THERAPY | Age: 85
Discharge: HOME | End: 2020-08-28
Attending: NURSE PRACTITIONER
Payer: MEDICARE

## 2020-08-28 PROCEDURE — 92507 TX SP LANG VOICE COMM INDIV: CPT | Performed by: SPEECH-LANGUAGE PATHOLOGIST

## 2020-08-28 PROCEDURE — 97112 NEUROMUSCULAR REEDUCATION: CPT

## 2020-08-28 NOTE — THERAPY DISCHARGE
Isidro Sweeney : 1935 [place] Secondary: [address] at [place] [address] [facility], [address], [place] [address]. [phone]   [phone] OUTPATIENT PHYSICAL THERAPY:Discharge Summary 2020 ICD-10: Treatment Diagnosis: Difficulty in walking, not elsewhere classified (R26.2) Precautions/Allergies:  
Amiodarone; Benazepril; Hydrochlorothiazide; and Lisinopril TREATMENT PLAN: 
Effective Dates: 2020 TO 10/26/2020 (90 days). Frequency/Duration: 1-2 times a week for 90 Day(s). Patient discharged from physical therapy. MEDICAL/REFERRING DIAGNOSIS: 
speech DATE OF ONSET: Chronic REFERRING PHYSICIAN: Dontae Joy NP MD Orders: Evaluate and treat Return MD Appointment: unknown INITIAL ASSESSMENT:  Mr. Vannesa Burrows presents with imbalance, vertigo, and difficulty walking. Patient is at higher fall risk based on score received on Dynamic Gait Index. Patient could have some right peripheral weakness contributing to his vertigo. Patient would benefit from skilled physical therapy to address problems and goals. Thank you for this referral.    
Discharge note:  Patient attended four scheduled physical therapy appointments from 2020 to 2020. Patient demonstrates improved balance since beginning therapy. Patient discharged from physical therapy. Thank you for this referral.    
PROBLEM LIST (Impacting functional limitations): 1. Decreased Ambulation Ability/Technique 2. Decreased Balance 3. Decreased [place] with Home Exercise Program 
4. Increased vertigo INTERVENTIONS PLANNED: (Treatment may consist of any combination of the following) 1. Balance Exercise 2. Gait Training 3. Home Exercise Program (HEP) 4. Habituation exercises GOALS: (Goals have been discussed and agreed upon with patient.) Short-Term Functional Goals: Time Frame: 30 days 1. Patient will be independent with home exercise program to improve vertigo. Goal met. Discharge Goals: Time Frame: 90 days 1. Patient will increase score on Dynamic Gait Index to greater than or equal to 22/24 demonstrating improvement. Goal met. 2. Patient will report decreased dizziness associated with daily activities. Goal met. OUTCOME MEASURE:  
Tool Used: Dynamic Gait Index Score:  Initial: 19/24 Most Recent: 22/24 (Date: 8- ) Interpretation of Score: Each section is scored on a 0-3 scale, 0 representing the patients inability to perform the task and 3 representing independence. The scores of each section are added together for a total score of 24. Any score below 19 indicates increased risk for falls. PAIN/SUBJECTIVE:  
Initial: Pain Intensity 1: 5  Post Session:  5/10 HISTORY:  
History of Injury/Illness (Reason for Referral): 
Patient reports having vertigo off and on over the past 20 years. Patient reports recent sinus infection which required antibiotics. Patient rates current dizziness as 2/10. Aggravating factors are quick head and body movements. Patient has had no falls. Patient uses no assistive device for ambulation. Patient has a history of inner ear infections as a child and denies motion sensitivity. Patient reports some visual issues.   
Past Medical History/Comorbidities:  
Mr. Lizbeth Leonard  has a past medical history of Abdominal pain (5/9/2016), Acute prostatitis, Backache, unspecified, Calculus of kidney, Cancer ([facility] [address] Diabetes ([facility] [address] [facility] Diverticulosis (1/14/14), Edema (5/9/2016), Elevated prostate specific antigen (PSA), Essential hypertension, benign, GERD (gastroesophageal reflux disease), H. pylori infection (12/1/14), History of barium enema (3/18/14), History of BPH, Hypertension, Hypertrophy of prostate without urinary obstruction and other lower urinary tract symptoms (LUTS), Impotence of organic origin, Lumbago, Malignant neoplasm of prostate [facility]), Microscopic hematuria, Multiple renal cysts (1/14/14), Osteopenia determined by x-ray, Other ill-defined conditions(799.89), PVCs (premature ventricular contractions) (5/9/2016), and SSS (s[facility] syndrome) ([facility] (5/9/2016). Mr. Tiffanie Delacruz  has a past surgical history that includes hx other surgical; hx hernia repair; hx radical prostatectomy; hx urological; hx tonsil and adenoidectomy; hx cholecystectomy; hx colonoscopy (2010); and hx endoscopy (07/12/2017). Social History/Living Environment:  
  Lives with spouse. Prior Level of Function/Work/Activity: 
Independent. Retired. Dominant Side:  
      RIGHT Personal Factors:   
      Sex:  male Age:  81 y.o. Ambulatory/Rehab Services H2 Model Falls Risk Assessment Risk Factors: 
     (1)  Dizziness/Vertigo (1)  Gender [Male] Ability to Rise from Chair: 
     (0)  Ability to rise in a single movement Falls Prevention Plan: No modifications necessary Total: (5 or greater = High Risk): 2  
©2010 [facility] [place] [address] [place] [address] States Patent #[id number]. Federal Law prohibits the replication, distribution or use without written permission from [facility] Current Medications:   
  
Current Outpatient Medications:  
  fluticasone propionate (FLONASE) 50 mcg/actuation nasal spray, SPRAY 2 SPRAYS INTO EACH NOSTRIL EVERY DAY, Disp: 1 Bottle, Rfl: 5 
  dilTIAZem ER (CARDIZEM CD) 120 mg capsule, TAKE 1 CAPSULE BY MOUTH EVERY DAY, Disp: 30 Cap, Rfl: 5 
  simethicone (GAS-X MAXIMUM STRENGTH PO), Take  by mouth., Disp: , Rfl:  
  polyethylene glycol 3350 (MIRALAX PO), Take  by mouth., Disp: , Rfl:  
  COQ10, UBIQUINOL, PO, Take  by mouth., Disp: , Rfl:  
  ALPHA LIPOIC ACID PO, Take  by mouth., Disp: , Rfl:  
  acetylcarnitine HCl (ACETYL L-CARNITINE PO), Take  by mouth., Disp: , Rfl:  
  valsartan (Diovan) 80 mg tablet, Take 1 Tab by mouth daily. , Disp: 30 Tab, Rfl: 5   cloNIDine HCL (CATAPRES) 0.1 mg tablet, Take 1 Tab by mouth daily as needed (take for BP greater than 160/90). , Disp: 30 Tab, Rfl: 0 
  famotidine (PEPCID) 20 mg tablet, Take 1 Tab by mouth two (2) times a day., Disp: 60 Tab, Rfl: 5 
  OTHER, Indications: Tynelol 500 mg, Disp: , Rfl:  
  magnesium oxide (MAG-OX) 400 mg tablet, TAKE 1 TAB BY MOUTH TWO (2) TIMES A DAY., Disp: 120 Tab, Rfl: 3 
  meclizine (ANTIVERT) 25 mg tablet, Take 1 Tab by mouth three (3) times daily as needed for Dizziness. , Disp: 90 Tab, Rfl: 2 
  aspirin 81 mg chewable tablet, Take 1 Tab by mouth daily. , Disp: 30 Tab, Rfl: 0 
  atorvastatin (LIPITOR) 80 mg tablet, Take 1 Tab by mouth nightly., Disp: 90 Tab, Rfl: 3 
  docusate sodium (STOOL SOFTENER) 100 mg tab, Take  by mouth., Disp: , Rfl:  
  OTHER, Organic Smooth Move for constipation, Disp: , Rfl:  
  triamcinolone acetonide (KENALOG) 0.025 % topical cream, Apply  to affected area two (2) times a day. use thin layer, Disp: , Rfl:  
  betamethasone dipropionate (DIPROLENE) 0.05 % ointment, Apply  to affected area two (2) times a day., Disp: , Rfl:  
  fexofenadine (ALLEGRA) 180 mg tablet, Take 180 mg by mouth daily as needed for Allergies. , Disp: , Rfl:  
  hydrocortisone (HYTONE) 2.5 % topical cream, Apply  to affected area two (2) times a day. use thin layer, Disp: 30 g, Rfl: 0 
  cholecalciferol, vitamin D3, (VITAMIN D3) 2,000 unit tab, Take  by mouth., Disp: , Rfl:  
  TURMERIC PO, Take  by mouth., Disp: , Rfl:  
  folic acid-vit F2-RKA F68 2.2-25-1 mg tab, Take  by mouth., Disp: , Rfl:  
  nystatin-triamcinolone (MYCOLOG II) topical cream, Apply  to affected area two (2) times daily as needed for Skin Irritation. , Disp: 30 g, Rfl: 1 Date Last Reviewed:  8/28/2020 Number of Personal Factors/Comorbidities that affect the Plan of Care: 1-2: MODERATE COMPLEXITY EXAMINATION:  
Functional Mobility: Gait/Ambulation:  Patient ambulates without assistive device with normal gait pattern except with head and body turns. Postural Control & Balance: · Meier Balance Scale:  Not tested. · Dynamic Gait Index:  22/24.   (A score less than or equal to19/24 is abnormal and predictive of falls). Score improved from 19/24 on initial evaluation. · goDog Fetcht Sensory Organization Test:  Not tested. Oculomotor Exam: · Eye Range of Motion:  full range of motion · Cervical Range of Motion:   full range of motion · Spontaneous nystagmus:  NO 
· Gaze holding nystagmus:  NO 
· Smooth Pursuit:   
  []Smooth Eye Movements []Delayed Eye Movements [x]Within Normal Limits []Other(comment): · Voluntary Saccades:   
  []Smooth Eye Movements []Delayed Eye Movements [x]Within Normal Limits []Other(comment):  
Vestibular Ocular Reflex Testing: · Dynamic Visual Acuity Test:  Not tested. · Head Thrust Test: Positive to the right. Negative to the left. Position Tests: 
· [place] testing presented as negative indicating that Benign Paroxysmal Positional Vertigo (BPPV) is absent bilaterally. Body Structures Involved: 1. Eyes and Ears 2. Muscles Body Functions Affected: 1. Neuromusculoskeletal Activities and Participation Affected: 1. General Tasks and Demands 2. Mobility 3. Community, Social and [place] Number of elements (examined above) that affect the Plan of Care: 4+: HIGH COMPLEXITY CLINICAL PRESENTATION:  
Presentation: Evolving clinical presentation with changing clinical characteristics: MODERATE COMPLEXITY CLINICAL DECISION MAKING:  
Use of outcome tool(s) and clinical judgement create a POC that gives a: Questionable prediction of patient's progress: MODERATE COMPLEXITY

## 2020-08-28 NOTE — PROGRESS NOTES
Shanika Bain  : 1935  [place]  Secondary: [address] at [address], [address] [place] [address],[address], [place] [address].  [phone]   [phone]         OUTPATIENT SPEECH LANGUAGE PATHOLOGY: Discharge Summary    ICD-10: Treatment Diagnosis: Cognitive Communication deficit R41.841   REFERRING PHYSICIAN: Leida Lowry MD MD Orders: Evaluate and Treat  Return Physician Appointment: Unknown  PAST MEDICAL HISTORY:   Mr. Nanda Strauss is a 81 y.o. male who  has a past medical history of Abdominal pain (2016), Acute prostatitis, Backache, unspecified, Calculus of kidney, Cancer ([facility] [address] Diabetes ([facility] [address] Diverticulosis (14), Edema (2016), Elevated prostate specific antigen (PSA), Essential hypertension, benign, GERD (gastroesophageal reflux disease), H. pylori infection (14), History of barium enema (3/18/14), History of BPH, Hypertension, Hypertrophy of prostate without urinary obstruction and other lower urinary tract symptoms (LUTS), Impotence of organic origin, Lumbago, Malignant neoplasm of prostate ([facility] [address] Microscopic hematuria, Multiple renal cysts (14), Osteopenia determined by x-ray, Other ill-defined conditions(799.89), PVCs (premature ventricular contractions) (2016), and SSS (sick sinus syndrome) ([facility] [address] (2016). He also  has a past surgical history that includes hx other surgical; hx hernia repair; hx radical prostatectomy; hx urological; hx tonsil and adenoidectomy; hx cholecystectomy; hx colonoscopy (); and hx endoscopy (2017). MEDICAL/REFERRING DIAGNOSIS: speech  DATE OF ONSET: 2019  PRIOR LEVEL OF FUNCTION: Independent ADL's  PRECAUTIONS/ALLERGIES: Seasonal allergies   ASSESSMENT:  Patient is an 81year old male who was referred for speech evaluation due to CVA. He suffered a CVA on 2019. He was admitted to [facility] and only stayed just two days and was discharged home. Patient reports difficulty with memory recall. He does have some difficulty with names as well. He reports that he doesn't have issues with long term recall just short term. He reports that he was having some difficulty with his memory prior to his CVA. Wife reports that he's having some difficulty with reading, writing, and balancing checkbook. Patient used to read a lot and now he's not reading as much. He also endorses some difficulty with adapting to change especially if things change with how he checks his finances online etc.  He was independent with ADL's prior to his CVA. Patient is present this date. He completed cognitive task with minimal assist. He is impulsive during his tasks but otherwise he is doing well. Just need to cue him to slow down. Please see below for specifics. He's having increased difficulty with memory recall probably due to his age and CVA. We spent time going over strategies for memory recall especially for ADL's such as appointments etc. He has partially met/met short and long term goals. We will discharge as patient has met his maximal potential. I do feel that he would benefit from f/u around 3 months to re-assess cognitive deficits as patient continues to require assistance with higher level ADL's.       ?????? ? ? This section established at most recent assessment??????????  PROBLEM LIST (Impairments causing functional limitations):  1. Cognitive decline  GOALS: (Goals have been discussed and agreed upon with patient.)  SHORT-TERM FUNCTIONAL GOALS: Time Frame: 12  1. The patient will complete a  (sustained, selective, alternating , divided) attention task with  no more than 3 number of redirections when given min  (verbal, visual, tactile) cues with 80%  Accuracy. Ongoing 6/10/20, Partially Met   2. The patient will demonstrate adequate attention to therapy tasks with no more than   two redirections in a 45-60 min session when given min cues with 80% accuracy. Met   3.  The patient will demonstrate the ability to follow multi-step directions related to functional living  environment with 80%verbal, visual and tactile cues in order to increase functional integration  into environment. Ongoing 6/10/2020, Met   4. The patient will demonstrate recall of functional information following a/an (immediate, shortterm,  long-term) delay with 75% accuracy with mod cues in order to increase functional integration into environment. Ongoing 6/10/2020, Partially Met   5. The patient will complete a mental manipulation task with 80% accuracy and min cueing in order  to increase functional integration into environment. Ongoing 6/10/2020. Met   6. The patient will complete executive functioning skills such as money and medication management at Mod I 90% accuracy. Ongoing 6/10/2020, Partially Met  DISCHARGE GOALS: Time Frame: 3 months  Client will develop functional, cognitive-linguistic-based skills and utilize compensatory  strategies to communicate wants and needs effectively, maintain safety during ADLs and  participate socially in functional living environment  REHABILITATION POTENTIAL FO   SUBJECTIVE:  Alert  Present Symptoms: Cognitive deficits  Pain Intensity 1: 0  Current Medications:   Current Outpatient Medications on File Prior to Encounter   Medication Sig Dispense Refill    fluticasone propionate (FLONASE) 50 mcg/actuation nasal spray SPRAY 2 SPRAYS INTO EACH NOSTRIL EVERY DAY 1 Bottle 5    dilTIAZem ER (CARDIZEM CD) 120 mg capsule TAKE 1 CAPSULE BY MOUTH EVERY DAY 30 Cap 5    simethicone (GAS-X MAXIMUM STRENGTH PO) Take  by mouth.  polyethylene glycol 3350 (MIRALAX PO) Take  by mouth.  COQ10, UBIQUINOL, PO Take  by mouth.  ALPHA LIPOIC ACID PO Take  by mouth.  acetylcarnitine HCl (ACETYL L-CARNITINE PO) Take  by mouth.  valsartan (Diovan) 80 mg tablet Take 1 Tab by mouth daily.  30 Tab 5    cloNIDine HCL (CATAPRES) 0.1 mg tablet Take 1 Tab by mouth daily as needed (take for BP greater than 160/90). 30 Tab 0    famotidine (PEPCID) 20 mg tablet Take 1 Tab by mouth two (2) times a day. 60 Tab 5    OTHER Indications: Tynelol 500 mg      magnesium oxide (MAG-OX) 400 mg tablet TAKE 1 TAB BY MOUTH TWO (2) TIMES A DAY. 120 Tab 3    meclizine (ANTIVERT) 25 mg tablet Take 1 Tab by mouth three (3) times daily as needed for Dizziness. 90 Tab 2    aspirin 81 mg chewable tablet Take 1 Tab by mouth daily. 30 Tab 0    atorvastatin (LIPITOR) 80 mg tablet Take 1 Tab by mouth nightly. 90 Tab 3    docusate sodium (STOOL SOFTENER) 100 mg tab Take  by mouth.  OTHER Organic Smooth Move for constipation      triamcinolone acetonide (KENALOG) 0.025 % topical cream Apply  to affected area two (2) times a day. use thin layer      betamethasone dipropionate (DIPROLENE) 0.05 % ointment Apply  to affected area two (2) times a day.  fexofenadine (ALLEGRA) 180 mg tablet Take 180 mg by mouth daily as needed for Allergies.  hydrocortisone (HYTONE) 2.5 % topical cream Apply  to affected area two (2) times a day. use thin layer 30 g 0    cholecalciferol, vitamin D3, (VITAMIN D3) 2,000 unit tab Take  by mouth.  TURMERIC PO Take  by mouth.  folic acid-vit E1-YVS U99 2.2-25-1 mg tab Take  by mouth.  nystatin-triamcinolone (MYCOLOG II) topical cream Apply  to affected area two (2) times daily as needed for Skin Irritation. 30 g 1     No current facility-administered medications on file prior to encounter. Date Last Reviewed: 8/28/2020    Social History/Home Situation:       Work/Activity History: Retired     OBJECTIVE:  Objective Measure:   Tool Used: National Outcomes Measurement System: Functional Communication Measures: ATTENTION  Score:  Initial: 4 Most Recent: 5 (Date: 8/28/20    Interpretation of Tool:  This measure describes the change in functional communication status subsequent to speech-language pathology treatment of patients who have attention deficits. o Level 1:  Attention is nonfunctional. The individual is generally unresponsive to most stimuli.  o Level 2: The individual can briefly attend with consistent maximal stimulation, but not long enough to complete even simple living tasks. o Level 3: The individual maintains attention over time to complete simple living tasks of short duration with consistent maximal cueing in the absence of distracting stimuli.  o Level 4: The individual maintains attention during simple living tasks of multiple steps and long duration within a minimally distracting environment with consistent minimal cueing. o Level 5: The individual maintains attention within simple living activities with occasional minimal cues within distracting environments. The individual requires increased cueing to start, continue, and change attention during complex activities. o Level 6: The individual maintains attention within complex activities, and can attend simultaneously to multiple demands with rare minimal cues. The individual usually uses compensatory strategies when encountering difficulty. The individual has mild difficulty or takes more than a reasonable amount of time to attend to multiple tasks/stimuli  o Level 7: The individuals ability to participate in vocational, a vocational or social activities is not limited by attentional abilities. Independent functioning may occasionally include the use of compensatory strategies. Score Level 7 Level 6 Level 5 Level 4 Level 3 Level 2 Level 1   Modifier CH CI CJ CK CL CM CN       Tool Used: National Outcomes Measurement System: Functional Communication Measures: MEMORY  Score:  Initial: 5 Most Recent:  8/28/2020   Interpretation of Tool: This measure describes the change in functional communication status subsequent to speech-language pathology treatment of patients who have memory deficits.   o Level 1:  The individual is unable to recall any information, regardless of cueing. o Level 2: The individual consistently requires maximal verbal cues or uses external aids to recall personal information (e.g., family members, biographical information, physical location, etc.) in structured environments. o Level 3: The individual usually requires maximum cues to recall or use external aids for simple routine and personal information (e.g., schedule, names of familiar staff, location of therapy areas, etc.) in structured environments. o Level 4: The individual occasionally requires minimal cues to recall or use external memory aids for simple routine and personal information in structured environments. The individual requires consistent maximal cues to recall or use memory aids for complex and novel information (e.g., carry out multiple steps activities, accommodate schedule changes, anticipate meal times, etc.), plan and follow through on simple future events (e.g., use calendar to keep appointments, use log books to complete a single assignment/task, etc.) in structured environments. o Level 5 The individual consistently requires minimal cues to recall or use external memory aids for complex and novel information. The individual consistently requires minimal cues to plan and follow through on complex future events (e.g., menu planning and meal preparation, planning a party, etc.).  o Level 6: The individual is able to recall or use external aids/memory strategies for complex information and planning complex future events most of the time. When there is a breakdown in the use of recall/memory strategies/external memory aids, the individual occasionally requires minimal cues. These breakdowns may occasionally interfere with the individuals functioning in vocational, avocational, and social activities. o Level 7:  The individual is successful and independent in recalling or using external aids/memory strategies for complex information and planning future events in all vocational, avocational, and social activities. Score Level 7 Level 6 Level 5 Level 4 Level 3 Level 2 Level 1   Modifier CH CI CJ CK CL CM CN     Oral Motor Structure/Speech:       SPEECH-LANGUAGE COGNITIVE EVALUATION  Tests Given: Ross Information Processing Assessment    Mental Status: Alert     Neuro-Linguistics:     1. Recalled recent events from weekend independently     2. Orientation questions:   Day of the week: +   Month: +  Year: +  Date: +    3. Completed following directions tasks 3 step 6 components  1st trial: Independently 100% accuracy      Pragmatics: WNL's    Cognitive Skills Activities: Activities/Procedures listed utilized to improve and/or restore cognitive function as related to memory and compensatory strategies for recall. Required moderate manual and   cueing to improve improve recall of information and perform graded activities focusing on attention skills. __________________________________________________________________________________________________  Discharge this date. Total Treatment Duration:  Time In: 1015   Time Out: Liv [address], Deb Evans

## 2020-08-28 NOTE — PROGRESS NOTES
Nichole Hi  : 1935  [place]  Secondary: [address] at [place] [address] [facility], [address] [place] [address],[address], [facility] [address]  [phone]   [phone]     OUTPATIENT PHYSICAL THERAPY: Daily Treatment Note 2020  Visit Count:  5    ICD-10: Treatment Diagnosis: Difficulty in walking, not elsewhere classified (R26.2)  Precautions/Allergies:   Amiodarone; Benazepril; Hydrochlorothiazide; and Lisinopril   TREATMENT PLAN:  Effective Dates: 2020 TO 10/26/2020 (90 days). Frequency/Duration: 1-2 times a week for 90 Day(s). Patient discharged from physical therapy. Pre-treatment Symptoms/Complaints:  \"I am more dizzy today for some reason. I hurt my back working in the yard and carrying [employer]". Pain: Initial: Pain Intensity 1: 5 Post Session:  5/10   Medications Last Reviewed:  2020  Updated Objective Findings:  See discharge note  TREATMENT:     NEUROMUSCULAR RE-EDUCATION: (30 minutes):  Exercise/activities per grid below to improve balance and vertigo. Required minimal verbal cues to promote correct body alignment. Date:  2020 Date:  20 Date:  20   Activity/Exercise Parameters Parameters Parameters   Walking with horizontal head turns 4 laps 4 laps 4 laps   Walking with vertical head turns 4 laps 4 laps 4 laps   Figure eights 4 reps 2x2 reps  2x3 reps each way   Circles right/circles left 2x2 reps each way 2x2 reps each way 2x3 reps each way   Walking in and out of cones 4 laps 4 laps 4 laps   Marching in simon 4 laps 4 laps 4 laps   Turning 360s X 2 circles  2 circles    [place]  X Eyes open with head turns; eyes closed Eyes open with head turns; eyes closed   Tiltboard Weight shifting left and right; weight shifting up and down. Eyes open and eyes closed Weight shifting left and right; weight shifting up and down.   Eyes open with head turns eyes closed Weight shifting left and right; weight shifting up and down. Eyes open and eyes closed   Semi tandem stance X  Blue foam eyes open and eyes closed   Stepping over half foam  2x10 reps     Walking with diagonal head turns 4 laps  4 laps                   [facility]  Treatment/Session Summary:    · Response to Treatment:  Patient demonstrates improved balance since initial evaluation. · Communication/Consultation:  None today  · Equipment provided today:  None today  · Recommendations/Intent for next treatment session: Patient discharged from physical therapy.     Total Treatment Billable Duration:  30 minutes  PT Patient Time In/Time Out  Time In: 1100  Time Out: [address], PT    Future Appointments   Date Time Provider Lewis Slater   9/17/2020  3:00 PM Carmencita Vasquez MD [facility]   1/25/2021 11:00 AM Ruperto Hurtado MD [facility]   2/16/2021  4:10 PM PGU [address] [place] [address] PGU   2/23/2021  3:00 PM Lilian Church MD [facility]

## 2020-09-17 ENCOUNTER — HOSPITAL ENCOUNTER (OUTPATIENT)
Dept: CT IMAGING | Age: 85
Discharge: HOME | End: 2020-09-17
Attending: INTERNAL MEDICINE
Payer: MEDICARE

## 2020-09-17 DIAGNOSIS — R47.89 WORD FINDING DIFFICULTY: ICD-10-CM

## 2020-09-17 DIAGNOSIS — Z86.73 HISTORY OF ISCHEMIC STROKE: ICD-10-CM

## 2020-09-17 DIAGNOSIS — I10 ESSENTIAL HYPERTENSION: ICD-10-CM

## 2020-09-17 PROCEDURE — 70450 CT HEAD/BRAIN W/O DYE: CPT

## 2020-10-15 ENCOUNTER — HOSPITAL ENCOUNTER (OUTPATIENT)
Dept: GENERAL RADIOLOGY | Age: 85
Discharge: HOME | End: 2020-10-15
Attending: NURSE PRACTITIONER
Payer: MEDICARE

## 2020-10-15 PROCEDURE — 72100 X-RAY EXAM L-S SPINE 2/3 VWS: CPT

## 2020-12-15 ENCOUNTER — HOSPITAL ENCOUNTER (INPATIENT)
Age: 85
LOS: 5 days | Discharge: NURSING, REHAB OR LONG-TERM CARE | DRG: 065 | End: 2020-12-21
Attending: EMERGENCY MEDICINE | Admitting: FAMILY MEDICINE
Payer: MEDICARE

## 2020-12-15 ENCOUNTER — APPOINTMENT (OUTPATIENT)
Dept: CT IMAGING | Age: 85
DRG: 065 | End: 2020-12-15
Attending: EMERGENCY MEDICINE
Payer: MEDICARE

## 2020-12-15 ENCOUNTER — APPOINTMENT (OUTPATIENT)
Dept: GENERAL RADIOLOGY | Age: 85
DRG: 065 | End: 2020-12-15
Attending: EMERGENCY MEDICINE
Payer: MEDICARE

## 2020-12-15 DIAGNOSIS — I61.4 CEREBELLAR BLEED (HCC): ICD-10-CM

## 2020-12-15 DIAGNOSIS — I10 ESSENTIAL HYPERTENSION: Chronic | ICD-10-CM

## 2020-12-15 LAB
ALBUMIN SERPL-MCNC: 4 G/DL (ref 3.2–4.6)
ALBUMIN/GLOB SERPL: 1.1 {RATIO} (ref 1.2–3.5)
ALP SERPL-CCNC: 73 U/L (ref 50–136)
ALT SERPL-CCNC: 26 U/L (ref 12–65)
ANION GAP SERPL CALC-SCNC: 6 MMOL/L (ref 7–16)
APPEARANCE UR: ABNORMAL
AST SERPL-CCNC: 30 U/L (ref 15–37)
BACTERIA URNS QL MICRO: 0 /HPF
BASOPHILS # BLD: 0 K/UL (ref 0–0.2)
BASOPHILS NFR BLD: 0 % (ref 0–2)
BILIRUB SERPL-MCNC: 0.4 MG/DL (ref 0.2–1.1)
BILIRUB UR QL: NEGATIVE
BUN SERPL-MCNC: 30 MG/DL (ref 8–23)
CALCIUM SERPL-MCNC: 9 MG/DL (ref 8.3–10.4)
CASTS URNS QL MICRO: 0 /LPF
CHLORIDE SERPL-SCNC: 109 MMOL/L (ref 98–107)
CO2 SERPL-SCNC: 30 MMOL/L (ref 21–32)
COLOR UR: YELLOW
CREAT SERPL-MCNC: 1.35 MG/DL (ref 0.8–1.5)
DIFFERENTIAL METHOD BLD: ABNORMAL
EOSINOPHIL # BLD: 0.1 K/UL (ref 0–0.8)
EOSINOPHIL NFR BLD: 2 % (ref 0.5–7.8)
EPI CELLS #/AREA URNS HPF: ABNORMAL /HPF
ERYTHROCYTE [DISTWIDTH] IN BLOOD BY AUTOMATED COUNT: 13.5 % (ref 11.9–14.6)
GLOBULIN SER CALC-MCNC: 3.7 G/DL (ref 2.3–3.5)
GLUCOSE SERPL-MCNC: 152 MG/DL (ref 65–100)
GLUCOSE UR STRIP.AUTO-MCNC: 100 MG/DL
HCT VFR BLD AUTO: 40 % (ref 41.1–50.3)
HGB BLD-MCNC: 13.2 G/DL (ref 13.6–17.2)
HGB UR QL STRIP: ABNORMAL
IMM GRANULOCYTES # BLD AUTO: 0.1 K/UL (ref 0–0.5)
IMM GRANULOCYTES NFR BLD AUTO: 1 % (ref 0–5)
KETONES UR QL STRIP.AUTO: NEGATIVE MG/DL
LEUKOCYTE ESTERASE UR QL STRIP.AUTO: NEGATIVE
LYMPHOCYTES # BLD: 1.9 K/UL (ref 0.5–4.6)
LYMPHOCYTES NFR BLD: 21 % (ref 13–44)
MAGNESIUM SERPL-MCNC: 2.3 MG/DL (ref 1.8–2.4)
MCH RBC QN AUTO: 31.1 PG (ref 26.1–32.9)
MCHC RBC AUTO-ENTMCNC: 33 G/DL (ref 31.4–35)
MCV RBC AUTO: 94.3 FL (ref 79.6–97.8)
MONOCYTES # BLD: 0.8 K/UL (ref 0.1–1.3)
MONOCYTES NFR BLD: 9 % (ref 4–12)
NEUTS SEG # BLD: 6.2 K/UL (ref 1.7–8.2)
NEUTS SEG NFR BLD: 68 % (ref 43–78)
NITRITE UR QL STRIP.AUTO: NEGATIVE
NRBC # BLD: 0 K/UL (ref 0–0.2)
PH UR STRIP: 7.5 [PH] (ref 5–9)
PLATELET # BLD AUTO: 150 K/UL (ref 150–450)
PMV BLD AUTO: 10.1 FL (ref 9.4–12.3)
POTASSIUM SERPL-SCNC: 3.8 MMOL/L (ref 3.5–5.1)
PROT SERPL-MCNC: 7.7 G/DL (ref 6.3–8.2)
PROT UR STRIP-MCNC: 30 MG/DL
RBC # BLD AUTO: 4.24 M/UL (ref 4.23–5.6)
RBC #/AREA URNS HPF: ABNORMAL /HPF
SODIUM SERPL-SCNC: 145 MMOL/L (ref 136–145)
SP GR UR REFRACTOMETRY: 1.01 (ref 1–1.02)
TROPONIN-HIGH SENSITIVITY: 13.6 PG/ML (ref 0–14)
UROBILINOGEN UR QL STRIP.AUTO: 0.2 EU/DL (ref 0.2–1)
WBC # BLD AUTO: 9.1 K/UL (ref 4.3–11.1)
WBC URNS QL MICRO: 0 /HPF

## 2020-12-15 PROCEDURE — 85025 COMPLETE CBC W/AUTO DIFF WBC: CPT

## 2020-12-15 PROCEDURE — 93005 ELECTROCARDIOGRAM TRACING: CPT | Performed by: EMERGENCY MEDICINE

## 2020-12-15 PROCEDURE — 74011000250 HC RX REV CODE- 250: Performed by: EMERGENCY MEDICINE

## 2020-12-15 PROCEDURE — 80053 COMPREHEN METABOLIC PANEL: CPT

## 2020-12-15 PROCEDURE — 81001 URINALYSIS AUTO W/SCOPE: CPT

## 2020-12-15 PROCEDURE — 83735 ASSAY OF MAGNESIUM: CPT

## 2020-12-15 PROCEDURE — 96375 TX/PRO/DX INJ NEW DRUG ADDON: CPT

## 2020-12-15 PROCEDURE — 96365 THER/PROPH/DIAG IV INF INIT: CPT

## 2020-12-15 PROCEDURE — 51702 INSERT TEMP BLADDER CATH: CPT

## 2020-12-15 PROCEDURE — 70450 CT HEAD/BRAIN W/O DYE: CPT

## 2020-12-15 PROCEDURE — 74011250636 HC RX REV CODE- 250/636: Performed by: EMERGENCY MEDICINE

## 2020-12-15 PROCEDURE — 4A03X5D MEASUREMENT OF ARTERIAL FLOW, INTRACRANIAL, EXTERNAL APPROACH: ICD-10-PCS | Performed by: RADIOLOGY

## 2020-12-15 PROCEDURE — 74011000250 HC RX REV CODE- 250

## 2020-12-15 PROCEDURE — 99285 EMERGENCY DEPT VISIT HI MDM: CPT

## 2020-12-15 PROCEDURE — 74011250636 HC RX REV CODE- 250/636

## 2020-12-15 PROCEDURE — 84484 ASSAY OF TROPONIN QUANT: CPT

## 2020-12-15 RX ORDER — ONDANSETRON 2 MG/ML
4 INJECTION INTRAMUSCULAR; INTRAVENOUS
Status: COMPLETED | OUTPATIENT
Start: 2020-12-15 | End: 2020-12-15

## 2020-12-15 RX ORDER — LABETALOL HYDROCHLORIDE 5 MG/ML
20 INJECTION, SOLUTION INTRAVENOUS ONCE
Status: COMPLETED | OUTPATIENT
Start: 2020-12-15 | End: 2020-12-15

## 2020-12-15 RX ADMIN — LABETALOL HYDROCHLORIDE 20 MG: 5 INJECTION INTRAVENOUS at 23:11

## 2020-12-15 RX ADMIN — NICARDIPINE HYDROCHLORIDE 5 MG/HR: 2.5 INJECTION, SOLUTION INTRAVENOUS at 23:50

## 2020-12-15 RX ADMIN — ONDANSETRON 4 MG: 2 INJECTION INTRAMUSCULAR; INTRAVENOUS at 23:42

## 2020-12-16 ENCOUNTER — APPOINTMENT (OUTPATIENT)
Dept: CT IMAGING | Age: 85
DRG: 065 | End: 2020-12-16
Attending: FAMILY MEDICINE
Payer: MEDICARE

## 2020-12-16 ENCOUNTER — APPOINTMENT (OUTPATIENT)
Dept: GENERAL RADIOLOGY | Age: 85
DRG: 065 | End: 2020-12-16
Attending: EMERGENCY MEDICINE
Payer: MEDICARE

## 2020-12-16 ENCOUNTER — HOSPITAL ENCOUNTER (EMERGENCY)
Dept: CT IMAGING | Age: 85
Discharge: HOME | DRG: 065 | End: 2020-12-16
Attending: EMERGENCY MEDICINE
Payer: MEDICARE

## 2020-12-16 PROBLEM — I61.4 CEREBELLAR BLEED (HCC): Status: ACTIVE | Noted: 2020-12-16

## 2020-12-16 PROBLEM — I16.1 HYPERTENSIVE EMERGENCY: Status: ACTIVE | Noted: 2020-12-16

## 2020-12-16 PROBLEM — Z20.822 SUSPECTED COVID-19 VIRUS INFECTION: Status: ACTIVE | Noted: 2020-12-16

## 2020-12-16 LAB
ANION GAP SERPL CALC-SCNC: 5 MMOL/L (ref 7–16)
ANION GAP SERPL CALC-SCNC: 6 MMOL/L (ref 7–16)
ATRIAL RATE: 83 BPM
BNP SERPL-MCNC: 1325 PG/ML
BUN SERPL-MCNC: 26 MG/DL (ref 8–23)
BUN SERPL-MCNC: 29 MG/DL (ref 8–23)
CALCIUM SERPL-MCNC: 8.5 MG/DL (ref 8.3–10.4)
CALCIUM SERPL-MCNC: 8.6 MG/DL (ref 8.3–10.4)
CALCULATED P AXIS, ECG09: 52 DEGREES
CALCULATED R AXIS, ECG10: 18 DEGREES
CALCULATED T AXIS, ECG11: 52 DEGREES
CHLORIDE SERPL-SCNC: 108 MMOL/L (ref 98–107)
CHLORIDE SERPL-SCNC: 109 MMOL/L (ref 98–107)
CO2 SERPL-SCNC: 28 MMOL/L (ref 21–32)
CO2 SERPL-SCNC: 31 MMOL/L (ref 21–32)
CREAT SERPL-MCNC: 1.15 MG/DL (ref 0.8–1.5)
CREAT SERPL-MCNC: 1.19 MG/DL (ref 0.8–1.5)
DIAGNOSIS, 93000: NORMAL
ERYTHROCYTE [DISTWIDTH] IN BLOOD BY AUTOMATED COUNT: 13.2 % (ref 11.9–14.6)
GLUCOSE BLD STRIP.AUTO-MCNC: 193 MG/DL (ref 65–100)
GLUCOSE SERPL-MCNC: 126 MG/DL (ref 65–100)
GLUCOSE SERPL-MCNC: 180 MG/DL (ref 65–100)
HCT VFR BLD AUTO: 37.6 % (ref 41.1–50.3)
HGB BLD-MCNC: 12.5 G/DL (ref 13.6–17.2)
INR BLD: 1.1 (ref 0.9–1.2)
INR PPP: 1
MCH RBC QN AUTO: 30.9 PG (ref 26.1–32.9)
MCHC RBC AUTO-ENTMCNC: 33.2 G/DL (ref 31.4–35)
MCV RBC AUTO: 92.8 FL (ref 79.6–97.8)
NRBC # BLD: 0 K/UL (ref 0–0.2)
P-R INTERVAL, ECG05: 178 MS
PLATELET # BLD AUTO: 157 K/UL (ref 150–450)
PMV BLD AUTO: 9.9 FL (ref 9.4–12.3)
POTASSIUM SERPL-SCNC: 3.7 MMOL/L (ref 3.5–5.1)
POTASSIUM SERPL-SCNC: 4.3 MMOL/L (ref 3.5–5.1)
PROTHROMBIN TIME: 13.7 SEC (ref 12.5–14.7)
PT BLD: 12.8 SECS (ref 9.6–11.6)
Q-T INTERVAL, ECG07: 388 MS
QRS DURATION, ECG06: 104 MS
QTC CALCULATION (BEZET), ECG08: 455 MS
RBC # BLD AUTO: 4.05 M/UL (ref 4.23–5.6)
SODIUM SERPL-SCNC: 143 MMOL/L (ref 138–145)
SODIUM SERPL-SCNC: 144 MMOL/L (ref 136–145)
TROPONIN-HIGH SENSITIVITY: 49.6 PG/ML (ref 0–14)
VENTRICULAR RATE, ECG03: 83 BPM
WBC # BLD AUTO: 9.1 K/UL (ref 4.3–11.1)

## 2020-12-16 PROCEDURE — 80048 BASIC METABOLIC PNL TOTAL CA: CPT

## 2020-12-16 PROCEDURE — 85610 PROTHROMBIN TIME: CPT

## 2020-12-16 PROCEDURE — 74011250637 HC RX REV CODE- 250/637: Performed by: FAMILY MEDICINE

## 2020-12-16 PROCEDURE — 85027 COMPLETE CBC AUTOMATED: CPT

## 2020-12-16 PROCEDURE — 2709999900 HC NON-CHARGEABLE SUPPLY

## 2020-12-16 PROCEDURE — 77030040361 HC SLV COMPR DVT MDII -B

## 2020-12-16 PROCEDURE — 71045 X-RAY EXAM CHEST 1 VIEW: CPT

## 2020-12-16 PROCEDURE — 70498 CT ANGIOGRAPHY NECK: CPT

## 2020-12-16 PROCEDURE — 82962 GLUCOSE BLOOD TEST: CPT

## 2020-12-16 PROCEDURE — 74011000636 HC RX REV CODE- 636: Performed by: EMERGENCY MEDICINE

## 2020-12-16 PROCEDURE — 74011000258 HC RX REV CODE- 258: Performed by: EMERGENCY MEDICINE

## 2020-12-16 PROCEDURE — 83880 ASSAY OF NATRIURETIC PEPTIDE: CPT

## 2020-12-16 PROCEDURE — 0042T CT PERF W CBF: CPT

## 2020-12-16 PROCEDURE — 65620000000 HC RM CCU GENERAL

## 2020-12-16 PROCEDURE — 74011250636 HC RX REV CODE- 250/636: Performed by: FAMILY MEDICINE

## 2020-12-16 PROCEDURE — 84484 ASSAY OF TROPONIN QUANT: CPT

## 2020-12-16 PROCEDURE — 87635 SARS-COV-2 COVID-19 AMP PRB: CPT

## 2020-12-16 PROCEDURE — 74011000250 HC RX REV CODE- 250: Performed by: FAMILY MEDICINE

## 2020-12-16 PROCEDURE — 36415 COLL VENOUS BLD VENIPUNCTURE: CPT

## 2020-12-16 PROCEDURE — 70450 CT HEAD/BRAIN W/O DYE: CPT

## 2020-12-16 PROCEDURE — 74011250636 HC RX REV CODE- 250/636

## 2020-12-16 RX ORDER — POLYETHYLENE GLYCOL 3350 17 G/17G
17 POWDER, FOR SOLUTION ORAL DAILY PRN
Status: DISCONTINUED | OUTPATIENT
Start: 2020-12-16 | End: 2020-12-21 | Stop reason: HOSPADM

## 2020-12-16 RX ORDER — SODIUM CHLORIDE 0.9 % (FLUSH) 0.9 %
5-40 SYRINGE (ML) INJECTION AS NEEDED
Status: DISCONTINUED | OUTPATIENT
Start: 2020-12-16 | End: 2020-12-21 | Stop reason: HOSPADM

## 2020-12-16 RX ORDER — FAMOTIDINE 20 MG/1
20 TABLET, FILM COATED ORAL 2 TIMES DAILY
Status: DISCONTINUED | OUTPATIENT
Start: 2020-12-16 | End: 2020-12-16

## 2020-12-16 RX ORDER — UREA 10 %
220 LOTION (ML) TOPICAL DAILY
Status: DISCONTINUED | OUTPATIENT
Start: 2020-12-16 | End: 2020-12-18

## 2020-12-16 RX ORDER — PROMETHAZINE HYDROCHLORIDE 25 MG/ML
INJECTION, SOLUTION INTRAMUSCULAR; INTRAVENOUS
Status: COMPLETED
Start: 2020-12-16 | End: 2020-12-16

## 2020-12-16 RX ORDER — SUCRALFATE 1 G/1
1 TABLET ORAL 4 TIMES DAILY
Status: DISCONTINUED | OUTPATIENT
Start: 2020-12-16 | End: 2020-12-18

## 2020-12-16 RX ORDER — PROMETHAZINE HYDROCHLORIDE 25 MG/1
12.5 TABLET ORAL
Status: DISCONTINUED | OUTPATIENT
Start: 2020-12-16 | End: 2020-12-16

## 2020-12-16 RX ORDER — ACETAMINOPHEN 650 MG/1
650 SUPPOSITORY RECTAL
Status: DISCONTINUED | OUTPATIENT
Start: 2020-12-16 | End: 2020-12-21 | Stop reason: HOSPADM

## 2020-12-16 RX ORDER — DILTIAZEM HYDROCHLORIDE 120 MG/1
120 CAPSULE, COATED, EXTENDED RELEASE ORAL DAILY
Status: DISCONTINUED | OUTPATIENT
Start: 2020-12-16 | End: 2020-12-21 | Stop reason: HOSPADM

## 2020-12-16 RX ORDER — FAMOTIDINE 20 MG/1
20 TABLET, FILM COATED ORAL DAILY
Status: DISCONTINUED | OUTPATIENT
Start: 2020-12-16 | End: 2020-12-19

## 2020-12-16 RX ORDER — LORATADINE 10 MG/1
10 TABLET ORAL DAILY
Status: DISCONTINUED | OUTPATIENT
Start: 2020-12-16 | End: 2020-12-21 | Stop reason: HOSPADM

## 2020-12-16 RX ORDER — ALBUTEROL SULFATE 90 UG/1
2 AEROSOL, METERED RESPIRATORY (INHALATION)
Status: DISCONTINUED | OUTPATIENT
Start: 2020-12-16 | End: 2020-12-21 | Stop reason: HOSPADM

## 2020-12-16 RX ORDER — FLUTICASONE PROPIONATE 50 MCG
2 SPRAY, SUSPENSION (ML) NASAL DAILY
Status: DISCONTINUED | OUTPATIENT
Start: 2020-12-16 | End: 2020-12-21 | Stop reason: HOSPADM

## 2020-12-16 RX ORDER — ASCORBIC ACID 500 MG
500 TABLET ORAL 2 TIMES DAILY
Status: DISCONTINUED | OUTPATIENT
Start: 2020-12-16 | End: 2020-12-18

## 2020-12-16 RX ORDER — FUROSEMIDE 10 MG/ML
20 INJECTION INTRAMUSCULAR; INTRAVENOUS ONCE
Status: COMPLETED | OUTPATIENT
Start: 2020-12-16 | End: 2020-12-16

## 2020-12-16 RX ORDER — SODIUM CHLORIDE 0.9 % (FLUSH) 0.9 %
5-40 SYRINGE (ML) INJECTION EVERY 8 HOURS
Status: DISCONTINUED | OUTPATIENT
Start: 2020-12-16 | End: 2020-12-21 | Stop reason: HOSPADM

## 2020-12-16 RX ORDER — ACETAMINOPHEN 325 MG/1
650 TABLET ORAL
Status: DISCONTINUED | OUTPATIENT
Start: 2020-12-16 | End: 2020-12-21 | Stop reason: HOSPADM

## 2020-12-16 RX ORDER — PROMETHAZINE HYDROCHLORIDE 25 MG/ML
INJECTION, SOLUTION INTRAMUSCULAR; INTRAVENOUS
Status: ACTIVE
Start: 2020-12-16 | End: 2020-12-16

## 2020-12-16 RX ORDER — SODIUM CHLORIDE 0.9 % (FLUSH) 0.9 %
10 SYRINGE (ML) INJECTION
Status: COMPLETED | OUTPATIENT
Start: 2020-12-16 | End: 2020-12-16

## 2020-12-16 RX ORDER — ONDANSETRON 2 MG/ML
4 INJECTION INTRAMUSCULAR; INTRAVENOUS
Status: DISCONTINUED | OUTPATIENT
Start: 2020-12-16 | End: 2020-12-21 | Stop reason: HOSPADM

## 2020-12-16 RX ADMIN — Medication 10 ML: at 00:20

## 2020-12-16 RX ADMIN — SODIUM CHLORIDE 100 ML: 900 INJECTION, SOLUTION INTRAVENOUS at 00:20

## 2020-12-16 RX ADMIN — NICARDIPINE HYDROCHLORIDE 5 MG/HR: 2.5 INJECTION, SOLUTION INTRAVENOUS at 11:37

## 2020-12-16 RX ADMIN — SUCRALFATE 1 G: 1 TABLET ORAL at 21:54

## 2020-12-16 RX ADMIN — PROMETHAZINE HYDROCHLORIDE 12.5 MG: 25 TABLET ORAL at 06:15

## 2020-12-16 RX ADMIN — OXYCODONE HYDROCHLORIDE AND ACETAMINOPHEN 500 MG: 500 TABLET ORAL at 09:01

## 2020-12-16 RX ADMIN — SUCRALFATE 1 G: 1 TABLET ORAL at 12:37

## 2020-12-16 RX ADMIN — SUCRALFATE 1 G: 1 TABLET ORAL at 09:00

## 2020-12-16 RX ADMIN — OXYCODONE HYDROCHLORIDE AND ACETAMINOPHEN 500 MG: 500 TABLET ORAL at 17:10

## 2020-12-16 RX ADMIN — PROMETHAZINE HYDROCHLORIDE: 25 INJECTION INTRAMUSCULAR; INTRAVENOUS at 01:32

## 2020-12-16 RX ADMIN — Medication 10 ML: at 22:00

## 2020-12-16 RX ADMIN — NICARDIPINE HYDROCHLORIDE 7.5 MG/HR: 2.5 INJECTION, SOLUTION INTRAVENOUS at 21:58

## 2020-12-16 RX ADMIN — DILTIAZEM HYDROCHLORIDE 120 MG: 120 CAPSULE, COATED, EXTENDED RELEASE ORAL at 09:01

## 2020-12-16 RX ADMIN — Medication 10 ML: at 14:00

## 2020-12-16 RX ADMIN — FUROSEMIDE 20 MG: 10 INJECTION, SOLUTION INTRAMUSCULAR; INTRAVENOUS at 12:38

## 2020-12-16 RX ADMIN — SUCRALFATE 1 G: 1 TABLET ORAL at 17:10

## 2020-12-16 RX ADMIN — Medication 10 ML: at 07:00

## 2020-12-16 RX ADMIN — LORATADINE 10 MG: 10 TABLET ORAL at 09:01

## 2020-12-16 RX ADMIN — FLUTICASONE PROPIONATE 2 SPRAY: 50 SPRAY, METERED NASAL at 09:01

## 2020-12-16 RX ADMIN — NICARDIPINE HYDROCHLORIDE 5 MG/HR: 2.5 INJECTION, SOLUTION INTRAVENOUS at 07:00

## 2020-12-16 RX ADMIN — Medication 220 MG: at 09:01

## 2020-12-16 RX ADMIN — ACETAMINOPHEN 650 MG: 325 TABLET, FILM COATED ORAL at 21:54

## 2020-12-16 RX ADMIN — NICARDIPINE HYDROCHLORIDE 5 MG/HR: 2.5 INJECTION, SOLUTION INTRAVENOUS at 15:00

## 2020-12-16 RX ADMIN — FAMOTIDINE 20 MG: 20 TABLET, FILM COATED ORAL at 09:01

## 2020-12-16 RX ADMIN — IOPAMIDOL 100 ML: 755 INJECTION, SOLUTION INTRAVENOUS at 00:20

## 2020-12-16 RX ADMIN — ONDANSETRON 4 MG: 2 INJECTION INTRAMUSCULAR; INTRAVENOUS at 14:19

## 2020-12-16 NOTE — PROGRESS NOTES
Chart reviewed and pt discussed with wife, Araseli Stone, via phone. She is quit upset that pt has been tested for Covid and moved to rule out area and can not visit. States they have been no where and checking temps daily. They have been together 56 years. Pt independent at home and does not use any DME's. No current HH or rehab. Had stroke previously and had some memory issues, but that had improved per spouse. Children live out of town, but son currently here and daughter flying in today. Aware CM will follow for d/c needs/POC for HH vs STR. Spouse states he will come home. Following. Spouse has direct line for CCU to call for updates. Care Management Interventions  PCP Verified by CM: Yes  Mode of Transport at Discharge: Other (see comment)  Transition of Care Consult (CM Consult): Discharge Planning  Discharge Durable Medical Equipment: (Glucometer)  Current Support Network: Lives with Spouse  Confirm Follow Up Transport: Family  The Plan for Transition of Care is Related to the Following Treatment Goals : HH vs STR  Name of the Patient Representative Who was Provided with a Choice of Provider and Agrees with the Discharge Plan: wife, Araseli Stone -states he will go home.    [place] of Choice List was Provided with Basic Dialogue that Supports the Patient's Individualized Plan of Care/Goals, Treatment Preferences and Shares the Quality Data Associated with the Providers?: Yes  [place] Resource Information Provided?: (MCR/supplemental)  Discharge Location  Discharge Placement: Unable to determine at this time

## 2020-12-16 NOTE — PROGRESS NOTES
Patient arrived to ICU on bed, dual skin assessment with Lolly Musa RN- patient has scattered bruising to extremities, sacrum intact and allevyn in place. No other abnormalities noted. Dual neuro assessment with Tia Hernandez RN: patient has slight L facial droop, R pupil 3 mm > L pupil 2 mm, no drift in any extremities, occasional mild dysarthria but patient corrects himself and is complaining that his throat is dry. Oriented x4.    Cardene infusing to maintain SBP <140

## 2020-12-16 NOTE — ED TRIAGE NOTES
Pt coming from home. Pt was in the shower and started to feel weak and dizzy. Denies losing consciousness. Pt did vomit before and with EMS. 4mg zofran given. /110 with EMS, 1inch nitro given, pt running bigeminal PVCs, pt becoming bradycardic with EMS. 300cc fluid given, last /90, hx of HTN and previous stroke. States he use to take BP medication 3 times today but is not taking once a day. Would not answer if this was cardiology decision or his. . Denies cardiac hx. 18g R forearm. Denies headache before the nitro. Denies weakness to one side or slurred speech or blurry vision.  RACE 0 and c[place] neg

## 2020-12-16 NOTE — H&P
HOSPITALIST H&P/CONSULT  NAME:  Remington Lee   Age:  81 y.o.  :   1935   MRN:   [id number]  PCP: Angelique Gomes MD  Consulting MD:  Treatment Team: Attending Provider: Bess Perla MD; Primary Nurse: Lilly Sow RN  HPI:   Note:  patient was seen and examined during prolonged computer downtime. Patient is an 81yoM with PMH significant for prior CVA ~1 year ago with no residual deficits and HTN who presents with altered mentation from home. Patient reported feeling lightheaded when in the shower last night and sat down. He felt uncoordinated with both arms/hands. He is not acting like himself either. Upon presentation to the ER, CODE S was called given his constellation of symptoms. His BP was severely elevated with BP of 244/127. CT head showed evidence of a lobulated R cerebellar hemorrhage. Patient was placed on cardene drip for BP control. Neurosurgery was consulted and recommended admission with tight BP control with SBP<140. Hospitalist consulted for admission. Complete ROS done and is as stated in HPI or otherwise negative.     Past Medical History:   Diagnosis Date    Abdominal pain 2016    Acute prostatitis     Backache, unspecified     Calculus of kidney     Cancer ([address]     prostate    Diabetes ([address]     hypoglycemia    Diverticulosis 14    Edema 2016    Elevated prostate specific antigen (PSA)     Essential hypertension, benign     GERD (gastroesophageal reflux disease)     H. pylori infection 14    s/p treatment    History of barium enema 3/18/14    ordered by Dr. Bernie Barry, GI    History of BPH     Hypertension     Hypertrophy of prostate without urinary obstruction and other lower urinary tract symptoms (LUTS)     Impotence of organic origin     Lumbago     Malignant neoplasm of prostate ([address]     Microscopic hematuria     Multiple renal cysts 14    left kidney, by CT    Osteopenia determined by x-ray     Dexa, T Score -1.0    Other ill-defined conditions(799.89)     chronic back pain    PVCs (premature ventricular contractions) 5/9/2016    SSS (sick sinus syndrome) ([facility] [facility] 5/9/2016    Past Medical History reviewed. Past Surgical History:   Procedure Laterality Date    HX CHOLECYSTECTOMY      HX COLONOSCOPY  2010    2 polyps benign    HX ENDOSCOPY  07/12/2017    HX HERNIA REPAIR      midline incisional    HX OTHER SURGICAL      prostate; melanoma also-back; hernia    HX RADICAL PROSTATECTOMY      HX TONSIL AND ADENOIDECTOMY      HX UROLOGICAL      cystoscopy with urethral dilatation      Prior to Admission Medications   Prescriptions Last Dose Informant Patient Reported? Taking? ALPHA LIPOIC ACID PO   Yes No   Sig: Take  by mouth. COQ10, UBIQUINOL, PO   Yes No   Sig: Take  by mouth. TURMERIC PO   Yes No   Sig: Take  by mouth. acetylcarnitine HCl (ACETYL L-CARNITINE PO)   Yes No   Sig: Take  by mouth. aspirin 81 mg chewable tablet   No No   Sig: Take 1 Tab by mouth daily. betamethasone dipropionate (DIPROLENE) 0.05 % ointment   Yes No   Sig: Apply  to affected area two (2) times a day. cholecalciferol, vitamin D3, (VITAMIN D3) 2,000 unit tab   Yes No   Sig: Take  by mouth. dilTIAZem ER (CARDIZEM CD) 120 mg capsule   No No   Sig: TAKE 1 CAPSULE BY MOUTH EVERY DAY   docusate sodium (STOOL SOFTENER) 100 mg tab   Yes No   Sig: Take  by mouth. famotidine (PEPCID) 20 mg tablet   No No   Sig: Take 1 Tab by mouth two (2) times a day. fexofenadine (ALLEGRA) 180 mg tablet   Yes No   Sig: Take 180 mg by mouth daily as needed for Allergies. fluticasone propionate (FLONASE) 50 mcg/actuation nasal spray   No No   Sig: SPRAY 2 SPRAYS INTO EACH NOSTRIL EVERY DAY   folic acid-vit B8-VBT B44 2.2-25-1 mg tab   Yes No   Sig: Take  by mouth. nystatin-triamcinolone (MYCOLOG II) topical cream   No No   Sig: Apply  to affected area two (2) times daily as needed for Skin Irritation.    sucralfate (CARAFATE) 100 mg/mL suspension   No No   Sig: Take 5 mL by mouth four (4) times daily. triamcinolone acetonide (KENALOG) 0.025 % topical cream   Yes No   Sig: Apply  to affected area two (2) times a day. use thin layer      Facility-Administered Medications: None     Allergies   Allergen Reactions    Amiodarone Rash    Benazepril Unknown (comments)    Hydrochlorothiazide Other (comments)     Hand redness and swelling.  Lisinopril Cough      Social History     Tobacco Use    Smoking status: Former Smoker     Packs/day: 2.00     Years: 22.00     Pack years: 44.00     Types: Cigarettes     Quit date: 3/5/1980     Years since quittin.8    Smokeless tobacco: Never Used   Substance Use Topics    Alcohol use: Yes     Alcohol/week: 0.0 standard drinks     Comment: 2-3 times weekly -one glass of wine      Family History   Problem Relation Age of Onset    Alzheimer Mother     Prostate Cancer Father     Family History reviewed and is non-contributory to current presentation. Objective:     Visit Vitals  /63   Pulse 61   Temp 97.3 °F (36.3 °C)   Resp 19   Ht 5' 9\" (1.753 m)   Wt 72.6 kg (160 lb)   SpO2 99%   BMI 23.63 kg/m²      Temp (24hrs), Av.3 °F (36.3 °C), Min:97.3 °F (36.3 °C), Max:97.3 °F (36.3 °C)    Oxygen Therapy  O2 Sat (%): 99 % (20 0044)  Pulse via Oximetry: 60 beats per minute (20 0044)  O2 Device: Room air (12/15/20 2250)  Physical Exam:  General:    Alert, cooperative, no distress, appears stated age. Head:   Normocephalic, without obvious abnormality, atraumatic. Nose:  Nares normal. No drainage or sinus tenderness. Lungs:   Clear to auscultation bilaterally. No Wheezing or Rhonchi. No rales. Heart:   Regular rate and rhythm, no murmur, rub or gallop. Abdomen:   Soft, non-tender. Not distended. Bowel sounds normal.   Extremities: No cyanosis. No edema. No clubbing. Skin:     Texture, turgor normal.  Not Jaundiced. Neurologic: Alert and oriented x 3, PERRL.  No obvious pronator drift in BUE. Patient has difficulty with some commands, as he feels that he cannot do things. Data Review:   Recent Results (from the past 24 hour(s))   CBC WITH AUTOMATED DIFF    Collection Time: 12/15/20 10:57 PM   Result Value Ref Range    WBC 9.1 4.3 - 11.1 K/uL    RBC 4.24 4.23 - 5.6 M/uL    HGB 13.2 (L) 13.6 - 17.2 g/dL    HCT 40.0 (L) 41.1 - 50.3 %    MCV 94.3 79.6 - 97.8 FL    MCH 31.1 26.1 - 32.9 PG    MCHC 33.0 31.4 - 35.0 g/dL    RDW 13.5 11.9 - 14.6 %    PLATELET 023 151 - 495 K/uL    MPV 10.1 9.4 - 12.3 FL    ABSOLUTE NRBC 0.00 0.0 - 0.2 K/uL    DF AUTOMATED      NEUTROPHILS 68 43 - 78 %    LYMPHOCYTES 21 13 - 44 %    MONOCYTES 9 4.0 - 12.0 %    EOSINOPHILS 2 0.5 - 7.8 %    BASOPHILS 0 0.0 - 2.0 %    IMMATURE GRANULOCYTES 1 0.0 - 5.0 %    ABS. NEUTROPHILS 6.2 1.7 - 8.2 K/UL    ABS. LYMPHOCYTES 1.9 0.5 - 4.6 K/UL    ABS. MONOCYTES 0.8 0.1 - 1.3 K/UL    ABS. EOSINOPHILS 0.1 0.0 - 0.8 K/UL    ABS. BASOPHILS 0.0 0.0 - 0.2 K/UL    ABS. IMM. GRANS. 0.1 0.0 - 0.5 K/UL   METABOLIC PANEL, COMPREHENSIVE    Collection Time: 12/15/20 10:57 PM   Result Value Ref Range    Sodium 145 136 - 145 mmol/L    Potassium 3.8 3.5 - 5.1 mmol/L    Chloride 109 (H) 98 - 107 mmol/L    CO2 30 21 - 32 mmol/L    Anion gap 6 (L) 7 - 16 mmol/L    Glucose 152 (H) 65 - 100 mg/dL    BUN 30 (H) 8 - 23 MG/DL    Creatinine 1.35 0.8 - 1.5 MG/DL    GFR est AA >60 >60 ml/min/1.73m2    GFR est non-AA 54 (L) >60 ml/min/1.73m2    Calcium 9.0 8.3 - 10.4 MG/DL    Bilirubin, total 0.4 0.2 - 1.1 MG/DL    ALT (SGPT) 26 12 - 65 U/L    AST (SGOT) 30 15 - 37 U/L    Alk.  phosphatase 73 50 - 136 U/L    Protein, total 7.7 6.3 - 8.2 g/dL    Albumin 4.0 3.2 - 4.6 g/dL    Globulin 3.7 (H) 2.3 - 3.5 g/dL    A-G Ratio 1.1 (L) 1.2 - 3.5     TROPONIN-HIGH SENSITIVITY    Collection Time: 12/15/20 10:57 PM   Result Value Ref Range    Troponin-High Sensitivity 13.6 0 - 14 pg/mL   MAGNESIUM    Collection Time: 12/15/20 10:57 PM   Result Value Ref Range    Magnesium 2. 3 1.8 - 2.4 mg/dL   URINALYSIS W/ RFLX MICROSCOPIC    Collection Time: 12/15/20 11:01 PM   Result Value Ref Range    Color YELLOW      Appearance TURBID      Specific gravity 1.014 1.001 - 1.023      pH (UA) 7.5 5.0 - 9.0      Protein 30 (A) NEG mg/dL    Glucose 100 mg/dL    Ketone Negative NEG mg/dL    Bilirubin Negative NEG      Blood SMALL (A) NEG      Urobilinogen 0.2 0.2 - 1.0 EU/dL    Nitrites Negative NEG      Leukocyte Esterase Negative NEG      WBC 0 0 /hpf    RBC 3-5 0 /hpf    Epithelial cells 0-3 0 /hpf    Bacteria 0 0 /hpf    Casts 0 0 /lpf   GLUCOSE, POC    Collection Time: 12/16/20 12:44 AM   Result Value Ref Range    Glucose (POC) 193 (H) 65 - 100 mg/dL   POC PT/INR    Collection Time: 12/16/20 12:46 AM   Result Value Ref Range    Prothrombin time (POC) 12.8 (H) 9.6 - 11.6 SECS    INR (POC) 1.1 0.9 - 1.2     TROPONIN-HIGH SENSITIVITY    Collection Time: 12/16/20  3:11 AM   Result Value Ref Range    Troponin-High Sensitivity 49.6 (H) 0 - 14 pg/mL   PROTHROMBIN TIME + INR    Collection Time: 12/16/20  3:11 AM   Result Value Ref Range    Prothrombin time 13.7 12.5 - 14.7 sec    INR 1.0     METABOLIC PANEL, BASIC    Collection Time: 12/16/20  3:11 AM   Result Value Ref Range    Sodium 144 136 - 145 mmol/L    Potassium 3.7 3.5 - 5.1 mmol/L    Chloride 108 (H) 98 - 107 mmol/L    CO2 31 21 - 32 mmol/L    Anion gap 5 (L) 7 - 16 mmol/L    Glucose 180 (H) 65 - 100 mg/dL    BUN 29 (H) 8 - 23 MG/DL    Creatinine 1.19 0.8 - 1.5 MG/DL    GFR est AA >60 >60 ml/min/1.73m2    GFR est non-AA >60 >60 ml/min/1.73m2    Calcium 8.6 8.3 - 10.4 MG/DL     Imaging /Procedures /Studies     Assessment and Plan:      Active Hospital Problems    Diagnosis Date Noted    Cerebellar bleed ([facility] [address] 12/16/2020    Hypertensive emergency 12/16/2020       PLAN  Cerebellar Hemorrhage  - No midline shift  - Neurosurgery consulted by ER  - MRI brain ordered, will need f/u CT head ordered in 24 hours for followup as well  - Neurology consulted  - Strict BP control to keep SBP<140   - Holding anticoagulation  - Frequent neurochecks  - Admit to ICU  - ICH score 2    Hypertensive Emergency  - Severely elevated BP  - Improving on cardene drip, will need to continue to keep SBP<140  - Monitor closely in ICU  - Restart home meds as able      Anticipated discharge: 2 days, pending clinical course    Signed By: Amy Felix MD     December 16, 2020

## 2020-12-16 NOTE — PROGRESS NOTES
ICH Management   Admit to ICU with [facility] neurological checks  Maintain SBP < 140 mmHg  STAT CT head for clinical worsening  Head of the bed at 30 degrees with neck in the neutral position  Repeat CT head 6 hours after initial imaging then 24 hours after initial imaging   Consider MRI to investigate underlying pathology such as neoplasms or vascular malformations  Maintain normoglycemia and normothermia   Wait about 72 hours on starting antiplt meds.

## 2020-12-16 NOTE — ED NOTES
TRANSFER - OUT REPORT:    Verbal report given to Dorota(name) on Amanda Spencer  being transferred to [facility](unit) for routine progression of care       Report consisted of patients Situation, Background, Assessment and   Recommendations(SBAR). Information from the following report(s) SBAR was reviewed with the receiving nurse. Lines:   Peripheral IV 12/15/20 Right Forearm (Active)   Site Assessment Clean, dry, & intact 12/15/20 2259   Phlebitis Assessment 0 12/15/20 2259   Infiltration Assessment 0 12/15/20 2259   Dressing Status Clean, dry, & intact 12/15/20 2259   Hub Color/Line Status Green 12/15/20 2259       Peripheral IV 12/16/20 Left Antecubital (Active)   Site Assessment Clean, dry, & intact 12/16/20 0030   Phlebitis Assessment 0 12/16/20 0030   Infiltration Assessment 0 12/16/20 0030   Dressing Status Clean, dry, & intact 12/16/20 0030   Hub Color/Line Status Green 12/16/20 0030        Opportunity for questions and clarification was provided.       Patient transported with:   Monitor  O2 @ 3 liters  Registered Nurse

## 2020-12-16 NOTE — ED PROVIDER NOTES
Patient is an 80-year-old male presenting the emergency department today with altered mental status and lack of coordination in the arms. Patient was normal when he started to get ready to go to bed and went up to the shower and then his wife heard him calling out for help. He said he got lightheaded and had to sit down on the shower stool. The patient says that he can't think right right now. The patient cannot move his arms coordinated and is able to raise both of his hands when asking to do finger-nose he flops them around back and forth and says I cannot do it. He is complaining of nausea and vomiting and has had several episodes of vomiting since symptoms started around 9:30 PM.  The patient also has tried to urinate 3 or 4 times since arrival to the emergency department I have to keep taking the urinal from him to get him to focus on my conversation. Wife says that he did have a stroke about a year ago but he did not have any deficits from it. She says this is all completely abnormal and she is never seen her  like this before.            Past Medical History:   Diagnosis Date    Abdominal pain 5/9/2016    Acute prostatitis     Backache, unspecified     Calculus of kidney     Cancer ([address]     prostate    Diabetes ([address]     hypoglycemia    Diverticulosis 1/14/14    Edema 5/9/2016    Elevated prostate specific antigen (PSA)     Essential hypertension, benign     GERD (gastroesophageal reflux disease)     H. pylori infection 12/1/14    s/p treatment    History of barium enema 3/18/14    ordered by Dr. Danielle Simmons, GI    History of BPH     Hypertension     Hypertrophy of prostate without urinary obstruction and other lower urinary tract symptoms (LUTS)     Impotence of organic origin     Lumbago     Malignant neoplasm of prostate ([address]     Microscopic hematuria     Multiple renal cysts 1/14/14    left kidney, by CT    Osteopenia determined by x-ray     Dexa, T Score -1.0    Other ill-defined conditions(799.89)     chronic back pain    PVCs (premature ventricular contractions) 2016    [facility]SS [facility]) ([email or web address] 2016       Past Surgical History:   Procedure Laterality Date    HX CHOLECYSTECTOMY      HX COLONOSCOPY      2 polyps benign    HX ENDOSCOPY  2017    HX HERNIA REPAIR      midline incisional    HX OTHER SURGICAL      prostate; melanoma also-back; hernia    HX RADICAL PROSTATECTOMY      HX TONSIL AND ADENOIDECTOMY      HX UROLOGICAL      cystoscopy with urethral dilatation         Family History:   Problem Relation Age of Onset    Alzheimer Mother     Prostate Cancer Father        Social History     Socioeconomic History    Marital status:      Spouse name: Not on file    Number of children: 2    Years of education: Not on file    Highest education level: Not on file   Occupational History    Not on file   Social Needs    Financial resource strain: Not on file    Food insecurity     Worry: Not on file     Inability: Not on file   [place] needs     Medical: Not on file     Non-medical: Not on file   Tobacco Use    Smoking status: Former Smoker     Packs/day: 2.00     Years: 22.00     Pack years: 44.00     Types: Cigarettes     Quit date: 3/5/1980     Years since quittin.8    Smokeless tobacco: Never Used   Substance and Sexual Activity    Alcohol use:  Yes     Alcohol/week: 0.0 standard drinks     Comment: 2-3 times weekly -one glass of wine    Drug use: No    Sexual activity: Not on file   Lifestyle    Physical activity     Days per week: Not on file     Minutes per session: Not on file    Stress: Not on file   Relationships    Social connections     Talks on phone: Not on file     Gets together: Not on file     Attends Christian service: Not on file     Active member of club or organization: Not on file     Attends meetings of clubs or organizations: Not on file     Relationship status: Not on file    Intimate partner violence     Fear of current or ex partner: Not on file     Emotionally abused: Not on file     Physically abused: Not on file     Forced sexual activity: Not on file   Other Topics Concern    Not on file   Social History Narrative    Not on file         ALLERGIES: Amiodarone, Benazepril, Hydrochlorothiazide, Lisinopril, and Valsartan    Review of Systems   Unable to perform ROS: Mental status change   Eyes: Positive for visual disturbance. Vitals:    12/19/20 1545 12/19/20 2107 12/19/20 2202 12/20/20 0106   BP: (!) 154/79 (!) 184/99 (!) 185/83 (!) 144/73   Pulse: 71 63 71 76   Resp: 18 18  18   Temp: 97.9 °F (36.6 °C) 98.1 °F (36.7 °C)  99.2 °F (37.3 °C)   SpO2: 93% 96%  96%   Weight:       Height:                Physical Exam     GENERAL:The patient has Body mass index is 23.6 kg/m². Well-hydrated. VITAL SIGNS: Heart rate, blood pressure, respiratory rate reviewed as recorded in  nurse's notes  EYES: Pupils reactive. Extraocular motion intact. No conjunctival redness or drainage. EARS: No external masses or lesions. NOSE: No nasal drainage or epistaxis. MOUTH/THROAT: Pharynx clear; airway patent. NECK: Supple, no meningeal signs. Trachea midline. No masses or thyromegaly. LUNGS: Breath sounds clear and equal bilaterally no accessory muscle use  CHEST: No deformity  CARDIOVASCULAR: Regular rate and rhythm  ABDOMEN: Soft without tenderness. No palpable masses or organomegaly. No  peritoneal signs. No rigidity. EXTREMITIES: No clubbing or cyanosis. No joint swelling. Normal muscle tone. No  restricted range of motion appreciated. NEUROLOGIC: Sensation is grossly intact. He seems to be moving his arms and legs without any difficulty. Pronator drift in the arms is negative. Pronator drift in the legs was difficult to test secondary to lack of participation.   Patient attempted finger-to-nose with both hands but was unable to do so and then just started shaking his hands and arms back and forth at me telling me that they did not work. No obvious facial drooping appreciated. Tongue is midline. SKIN: No rash or petechiae. Good skin turgor palpated. PSYCHIATRIC: Alert and oriented to person place and year. Patient however is acting very bizarrely and noncooperative with questioning or physical exam.      MDM  Number of Diagnoses or Management Options  Diagnosis management comments: TIA, CVA, intracranial hemorrhage, ischemic stroke, brain tumor, Bell's palsy,    tension headache, migraine headache,    peripheral neuropathy, metabolic disorder, Guillian Grapevine syndrome, multiple sclerosis,    electrolyte abnormality    seizure, pseudoseizures, status epilepticus, malingering    meningitis, encephalitis,         Amount and/or Complexity of Data Reviewed  Clinical lab tests: ordered and reviewed  Tests in the radiology section of CPT®: ordered and reviewed  Tests in the medicine section of CPT®: reviewed and ordered  Obtain history from someone other than the patient: yes  Review and summarize past medical records: yes  Independent visualization of images, tracings, or specimens: yes      ED Course as of Dec 20 0242   Tue Dec 15, 2020   2347 Case discussed with radiology and CT head/neck with perfusion will be obtained    [KH]   2353 Case was discussed with the neurologist she recommends blood pressure control 051-314 systolic and if any signs of hydrocephalus starts develop mannitol 1 mg per kick. [KH]   Wed Dec 16, 2020   0004 IMPRESSION:     A 1.5 x 2.5 cm lobulated right cerebellar hemorrhage. Close follow-up  recommended. CT CODE NEURO HEAD WO CONTRAST [KH]   0030 I spoke to Dr. Radha Mao he reviewed the films. No signs of hydrocephalus appreciated. The patient's blood pressure is improved. We will turn the Cardene back and keep his blood pressure systolic below 038 per his recommendations.     [KH]   0031 Case discussed with Dr. Venita Hall she was updated regarding the findings and need for admission and they will come and see him. [KH]      ED Course User Index  [KH] Albaro Garcia DO       Critical Care  Performed by: Albaro Garcia DO  Authorized by: Albaro Garcia DO     Comments:      Critical care time: 100 minutes of critical care time was performed in the emergency department. This was separate from any other procedures listed during the patients emergency department course. The failure to initiate these interventions on an urgent basis would likely have resulted in sudden, clinically significant or life-threatening deterioration in the patients condition.

## 2020-12-16 NOTE — PROGRESS NOTES
TRANSFER - IN REPORT:    Verbal report received from John RN(name) on Donny Corrales  being received from PACU (unit) for routine progression of care      Report consisted of patient’s Situation, Background, Assessment and   Recommendations(SBAR).     Information from the following report(s) SBAR, Kardex, ED Summary, MAR and Cardiac Rhythm NSR/SB was reviewed with the receiving nurse.    Opportunity for questions and clarification was provided.      Assessment completed upon patient’s arrival to unit and care assumed.     Dual neuro assessment performed- occasional mild dysarthria, L pupil 2 mm round and brisk, R pupil 3 mm round and brisk, mild L facial droop

## 2020-12-16 NOTE — PROGRESS NOTES
TRANSFER - OUT REPORT:    Verbal report given to Naomie DENG(name) on Batsheva Ross  being transferred to icu(unit) for routine progression of care       Report consisted of patients Situation, Background, Assessment and   Recommendations(SBAR). Information from the following report(s) SBAR, Kardex, ED Summary, Procedure Summary, Intake/Output, MAR and Recent Results was reviewed with the receiving nurse. Lines:   Peripheral IV 12/15/20 Right Forearm (Active)   Site Assessment Clean, dry, & intact 12/16/20 0700   Phlebitis Assessment 0 12/16/20 0700   Infiltration Assessment 0 12/16/20 0700   Dressing Status Clean, dry, & intact 12/16/20 0700   Hub Color/Line Status Green 12/15/20 2259       Peripheral IV 12/16/20 Left Antecubital (Active)   Site Assessment Clean, dry, & intact 12/16/20 0700   Phlebitis Assessment 0 12/16/20 0700   Infiltration Assessment 0 12/16/20 0700   Dressing Status Clean, dry, & intact 12/16/20 0700   Hub Color/Line Status Green 12/16/20 0030        Opportunity for questions and clarification was provided.       Patient transported with:   Monitor  O2 @ 3 liters  Registered Nurse

## 2020-12-16 NOTE — PROGRESS NOTES
NEUROSURGERY PLAN OF CARE NOTE:     Chart and Imaging Reviewed: Patient Discussed with Dr. Abdi Diaz 81 y.o. male presented to [facility] following altered mental status and lack of coordination. He had stroke a year ago. I have independently reviewed and interpreted the CT Head WO contrast which demonstrates dolichoectasia vertebral and basilar artery with a intraparenchymal hemorrhage right middle cerebellar peduncle with some extension into the medial cerebellar hemisphere and 4th ventricle. No significant mass effect or brain compression. No evidence of hydrocephalus. No acute neurosurgical intervention necessary at this time. Recommend SBP < 140 mmHg. Recommend no anti-platelet or anti-coagulation. Recommend repeat CT Head WO contrast in am. Recommend formal neurology evaluation in am as part of stroke evaluation as patient has had a hypertensive cerebellar hemorrhage. ICH score 1 point for age greater than [de-identified], 1 point for infratentorial location, and 1 point for intraventricular hemorrhage (ICH score 3). I spent a total of 5-10 minutes of medical consultative discussion and review. Katie Casiano.  Mick Sauer

## 2020-12-16 NOTE — ACP (ADVANCE CARE PLANNING)
Advance Care Planning     Advance Care Planning Activator (Inpatient)  Conversation Note      Date of ACP Conversation: 12/16/20     Conversation Conducted with:   Healthcare Decision Maker: Named in Advance Directive or Healthcare Power of -not on file currently. Wife states she has sent to floor. ACP Activator: Garett Rowley RN    *When Decision Maker makes decisions on behalf of the incapacitated patient: Decision Maker is asked to consider and make decisions based on patient values, known preferences, or best interests. Health Care Decision Maker:    Current Designated Health Care Decision Maker:   (If there is a valid [address] named in the [facility]\" box in the ACP activity, but it is not visible above, be sure to open that field and then select the health care decision maker relationship (ie \"primary\") in the blank space to the right of the name.) Validate  this information as still accurate & up-to-date; edit [address] field as needed.)    Note: Assess and validate information in current ACP documents, as indicated. If no Decision Maker listed above or available through scanned documents, then:    If no Authorized Decision Maker has previously been identified, then patient chooses [address]:  \"Who would you like to name as your primary health care decision-maker? \"    Name: Ashish Wilson   Relationship: son Phone number: [phone]  Katvidal Pastures this person be reached easily? \" YES  \"Who would you like to name as your back-up decision maker? \"   Name: Matthew Cameron  Relationship: daughter Phone number: [phone]  Kathreen Pastures this person be reached easily? \" YES    Note: If the relationship of these Decision-Makers to the patient does NOT follow your state's Next of Kin hierarchy, recommend that patient complete ACP document that meets state-specific requirements to allow them to act on the patient's behalf when appropriate.     Care Preferences    Ventilation: \"If you were in your present state of health and suddenly became very ill and were unable to breathe on your own, what would your preference be about the use of a ventilator (breathing machine) if it were available to you? \"      If patient would desire the use of a ventilator (breathing machine), answer \"yes\", if not \"no\":yes    \"If your health worsens and it becomes clear that your chance of recovery is unlikely, what would your preference be about the use of a ventilator (breathing machine) if it were available to you? \"     Would the patient desire the use of a ventilator (breathing machine)? UNKNOWN      Resuscitation  \"CPR works best to restart the heart when there is a sudden event, like a heart attack, in someone who is otherwise healthy. Unfortunately, CPR does not typically restart the heart for people who have serious health conditions or who are very sick. \"    \"In the event your heart stopped as a result of an underlying serious health condition, would you want attempts to be made to restart your heart (answer \"yes\" for attempt to resuscitate) or would you prefer a natural death (answer \"no\" for do not attempt to resuscitate)? \" yes      NOTE: If the patient has a valid advance directive AND now provides care preference(s) that are inconsistent with that prior directive, advise the patient to consider either: creating a new advance directive that complies with state-specific requirements; or, if that is not possible, orally revoking that prior directive in accordance with state-specific requirements, which must be documented in the EHR. [x] Yes  [] No   Educated Patient / Mary Kay Chan regarding differences between Advance Directives and portable DNR orders.     Length of ACP Conversation in minutes:      Conversation Outcomes:  [x] ACP discussion completed  [] Existing advance directive reviewed with patient; no changes to patient's previously recorded wishes     [] New Advance Directive completed   [] Portable Do Not Resuscitate prepared for Provider review and signature  [] POLST/POST/MOLST/MOST prepared for Provider review and signature      Follow-up plan:    [] Schedule follow-up conversation to continue planning  [] Referred individual to Provider for additional questions/concerns   [] Advised patient/agent/surrogate to review completed ACP document and update if needed with changes in condition, patient preferences or care setting     [] This note routed to one or more involved healthcare providers

## 2020-12-16 NOTE — PROGRESS NOTES
Progress Note    Patient: Zeyad Rubalcava MRN: [id number]  SSN: [id number]    YOB: 1935  Age: 81 y.o. Sex: male      Admit Date: 12/15/2020    LOS: 0 days     Subjective:   F/U right cerebellar hemorrhage and abnormal chest x ray     \"16VKG with PMH significant for prior CVA ~1 year ago with no residual deficits and HTN who presents with altered mentation from home. Patient reported feeling lightheaded when in the shower last night and sat down. He felt uncoordinated with both arms/hands. He is not acting like himself either. Upon presentation to the ER, CODE S was called given his constellation of symptoms. His BP was severely elevated with BP of 244/127. CT head showed evidence of a lobulated R cerebellar hemorrhage. Patient was placed on cardene drip for BP control. Neurosurgery was consulted and recommended admission with tight BP control with SBP<140.\" Chest x ray showed Bilateral interstitial prominence, likely due to vascular congestion. Atypical infection is possible. Oxygen saturations decreasing on RA. BP controlled on Cardene drip. No chest pain or SOB but does admit to increased nausea over night along with emesis. Throat is hurting. No known sick contacts. Denies loss of smell or taste. Admits to poor oral intake since yesterday when symptoms started.    Current Facility-Administered Medications   Medication Dose Route Frequency    dilTIAZem ER (CARDIZEM CD) capsule 120 mg  120 mg Oral DAILY    sodium chloride (NS) flush 5-40 mL  5-40 mL IntraVENous Q8H    sodium chloride (NS) flush 5-40 mL  5-40 mL IntraVENous PRN    acetaminophen (TYLENOL) tablet 650 mg  650 mg Oral Q6H PRN    Or    acetaminophen (TYLENOL) suppository 650 mg  650 mg Rectal Q6H PRN    polyethylene glycol (MIRALAX) packet 17 g  17 g Oral DAILY PRN    ondansetron (ZOFRAN) injection 4 mg  4 mg IntraVENous Q6H PRN    niCARdipine in Saline (CARDENE) 25 MG/250 mL infusion kit  0-15 mg/hr IntraVENous TITRATE    promethazine (PHENERGAN) 25 mg/mL injection        famotidine (PEPCID) tablet 20 mg  20 mg Oral DAILY    loratadine (CLARITIN) tablet 10 mg  10 mg Oral DAILY    fluticasone propionate (FLONASE) 50 mcg/actuation nasal spray 2 Spray  2 Spray Both Nostrils DAILY    sucralfate (CARAFATE) tablet 1 g  1 g Oral QID    ascorbic acid (vitamin C) (VITAMIN C) tablet 500 mg  500 mg Oral BID    zinc sulfate tablet 220 mg  220 mg Oral DAILY    albuterol (PROVENTIL HFA, VENTOLIN HFA, PROAIR HFA) inhaler 2 Puff  2 Puff Inhalation Q6H PRN       Objective:     Vitals:    12/16/20 0720 12/16/20 0734 12/16/20 0735 12/16/20 0812   BP: 138/60  (!) 131/100 126/67   Pulse: 74 68  63   Resp: 24 22     Temp:       SpO2: 91%  (!) 89%    Weight:       Height:             Intake and Output:  Current Shift: 12/16 0701 - 12/16 1900  In: 295 [I.V.:295]  Out: -   Last three shifts: 12/14 1901 - 12/16 0700  In: -   Out: 800 [Urine:700]    Physical Exam:   General:  Alert, cooperative, continues to move in bed playing with wires. Soft spoken but answering all questions appropriately. Obvious nausea noted and he is suctioning mouth   Eyes:  Conjunctivae/corneas clear. No nystagmus    Ears:  Normal TMs and external ear canals both ears. Nose: Nares normal. Septum midline. Mouth/Throat: Lips, mucosa, and tongue normal. No erythema to posterior pharynx    Neck:  no JVD. Back:   Symmetric, no curvature. ROM normal. No CVA tenderness. Lungs:   Clear to auscultation bilaterally but limited breathe sounds. Heart:  Regular rate and rhythm, S1, S2 normal   Abdomen:   Soft, non-tender. Bowel sounds normal. No masses,  No organomegaly. Extremities: Extremities normal, atraumatic, no cyanosis or edema. Pulses: 2+ and symmetric all extremities.    Skin: Skin color, texture, turgor normal. No rashes or lesions   Lymph nodes: Cervical, supraclavicular, and axillary nodes normal.   Neurologic: No obvious confusion on exam, appearing weak       Lab/Data Review:    Recent Results (from the past 24 hour(s))   CBC WITH AUTOMATED DIFF    Collection Time: 12/15/20 10:57 PM   Result Value Ref Range    WBC 9.1 4.3 - 11.1 K/uL    RBC 4.24 4.23 - 5.6 M/uL    HGB 13.2 (L) 13.6 - 17.2 g/dL    HCT 40.0 (L) 41.1 - 50.3 %    MCV 94.3 79.6 - 97.8 FL    MCH 31.1 26.1 - 32.9 PG    MCHC 33.0 31.4 - 35.0 g/dL    RDW 13.5 11.9 - 14.6 %    PLATELET 937 074 - 099 K/uL    MPV 10.1 9.4 - 12.3 FL    ABSOLUTE NRBC 0.00 0.0 - 0.2 K/uL    DF AUTOMATED      NEUTROPHILS 68 43 - 78 %    LYMPHOCYTES 21 13 - 44 %    MONOCYTES 9 4.0 - 12.0 %    EOSINOPHILS 2 0.5 - 7.8 %    BASOPHILS 0 0.0 - 2.0 %    IMMATURE GRANULOCYTES 1 0.0 - 5.0 %    ABS. NEUTROPHILS 6.2 1.7 - 8.2 K/UL    ABS. LYMPHOCYTES 1.9 0.5 - 4.6 K/UL    ABS. MONOCYTES 0.8 0.1 - 1.3 K/UL    ABS. EOSINOPHILS 0.1 0.0 - 0.8 K/UL    ABS. BASOPHILS 0.0 0.0 - 0.2 K/UL    ABS. IMM. GRANS. 0.1 0.0 - 0.5 K/UL   METABOLIC PANEL, COMPREHENSIVE    Collection Time: 12/15/20 10:57 PM   Result Value Ref Range    Sodium 145 136 - 145 mmol/L    Potassium 3.8 3.5 - 5.1 mmol/L    Chloride 109 (H) 98 - 107 mmol/L    CO2 30 21 - 32 mmol/L    Anion gap 6 (L) 7 - 16 mmol/L    Glucose 152 (H) 65 - 100 mg/dL    BUN 30 (H) 8 - 23 MG/DL    Creatinine 1.35 0.8 - 1.5 MG/DL    GFR est AA >60 >60 ml/min/1.73m2    GFR est non-AA 54 (L) >60 ml/min/1.73m2    Calcium 9.0 8.3 - 10.4 MG/DL    Bilirubin, total 0.4 0.2 - 1.1 MG/DL    ALT (SGPT) 26 12 - 65 U/L    AST (SGOT) 30 15 - 37 U/L    Alk.  phosphatase 73 50 - 136 U/L    Protein, total 7.7 6.3 - 8.2 g/dL    Albumin 4.0 3.2 - 4.6 g/dL    Globulin 3.7 (H) 2.3 - 3.5 g/dL    A-G Ratio 1.1 (L) 1.2 - 3.5     TROPONIN-HIGH SENSITIVITY    Collection Time: 12/15/20 10:57 PM   Result Value Ref Range    Troponin-High Sensitivity 13.6 0 - 14 pg/mL   MAGNESIUM    Collection Time: 12/15/20 10:57 PM   Result Value Ref Range    Magnesium 2.3 1.8 - 2.4 mg/dL   URINALYSIS W/ RFLX MICROSCOPIC    Collection Time: 12/15/20 11:01 PM   Result Value Ref Range    Color YELLOW      Appearance TURBID      Specific gravity 1.014 1.001 - 1.023      pH (UA) 7.5 5.0 - 9.0      Protein 30 (A) NEG mg/dL    Glucose 100 mg/dL    Ketone Negative NEG mg/dL    Bilirubin Negative NEG      Blood SMALL (A) NEG      Urobilinogen 0.2 0.2 - 1.0 EU/dL    Nitrites Negative NEG      Leukocyte Esterase Negative NEG      WBC 0 0 /hpf    RBC 3-5 0 /hpf    Epithelial cells 0-3 0 /hpf    Bacteria 0 0 /hpf    Casts 0 0 /lpf   GLUCOSE, POC    Collection Time: 12/16/20 12:44 AM   Result Value Ref Range    Glucose (POC) 193 (H) 65 - 100 mg/dL   POC PT/INR    Collection Time: 12/16/20 12:46 AM   Result Value Ref Range    Prothrombin time (POC) 12.8 (H) 9.6 - 11.6 SECS    INR (POC) 1.1 0.9 - 1.2     TROPONIN-HIGH SENSITIVITY    Collection Time: 12/16/20  3:11 AM   Result Value Ref Range    Troponin-High Sensitivity 49.6 (H) 0 - 14 pg/mL   PROTHROMBIN TIME + INR    Collection Time: 12/16/20  3:11 AM   Result Value Ref Range    Prothrombin time 13.7 12.5 - 14.7 sec    INR 1.0     METABOLIC PANEL, BASIC    Collection Time: 12/16/20  3:11 AM   Result Value Ref Range    Sodium 144 136 - 145 mmol/L    Potassium 3.7 3.5 - 5.1 mmol/L    Chloride 108 (H) 98 - 107 mmol/L    CO2 31 21 - 32 mmol/L    Anion gap 5 (L) 7 - 16 mmol/L    Glucose 180 (H) 65 - 100 mg/dL    BUN 29 (H) 8 - 23 MG/DL    Creatinine 1.19 0.8 - 1.5 MG/DL    GFR est AA >60 >60 ml/min/1.73m2    GFR est non-AA >60 >60 ml/min/1.73m2    Calcium 8.6 8.3 - 10.4 MG/DL       Assessment/ Plan: Active Problems:    Cerebellar bleed ([facility] [address] (12/16/2020)      Hypertensive emergency (12/16/2020)      Suspected COVID-19 virus infection (12/16/2020)    Right cerebellar hemorrhage - Avoid antiplatelets/anticoagulation. Keep BP <130/90. Currently on Cardene drip. Continue neuro checks. No trauma to explain hemorrhage. Likely hemorrhage from his uncontrolled HTN. Consult neurology. Repeat CT head tonight.      Abnormal chest x ray with nausea, emesis, sudden weakness since yesterday, and now decline in oxygen saturation on room air - Order COVID test. Will have to transfer to ICU COVID. Start Vitamin C, Zinc. PRN albuterol. Holding off on further tx for COVID until results are back. Nausea/emesis - COVID pending. PRN zofran    HTN- diltiazem    Due to having to consult neurology, review results, order COVID testing, along with transferring patient, time took greater than 35 minutes. I have also updated the wife and son who were very upset that we are testing patient for COVID. Wife and son would like daily updates.      DVT prophylaxis - SCDs  Signed By: Rene Arrieta,      December 16, 2020

## 2020-12-17 ENCOUNTER — APPOINTMENT (OUTPATIENT)
Dept: GENERAL RADIOLOGY | Age: 85
DRG: 065 | End: 2020-12-17
Attending: FAMILY MEDICINE
Payer: MEDICARE

## 2020-12-17 ENCOUNTER — APPOINTMENT (OUTPATIENT)
Dept: CT IMAGING | Age: 85
DRG: 065 | End: 2020-12-17
Attending: FAMILY MEDICINE
Payer: MEDICARE

## 2020-12-17 LAB
ANION GAP SERPL CALC-SCNC: 4 MMOL/L (ref 7–16)
BUN SERPL-MCNC: 19 MG/DL (ref 8–23)
CALCIUM SERPL-MCNC: 7.5 MG/DL (ref 8.3–10.4)
CHLORIDE SERPL-SCNC: 113 MMOL/L (ref 98–107)
CO2 SERPL-SCNC: 28 MMOL/L (ref 21–32)
COVID-19 RAPID TEST, COVR: NOT DETECTED
CREAT SERPL-MCNC: 1.08 MG/DL (ref 0.8–1.5)
ERYTHROCYTE [DISTWIDTH] IN BLOOD BY AUTOMATED COUNT: 13.4 % (ref 11.9–14.6)
GLUCOSE SERPL-MCNC: 90 MG/DL (ref 65–100)
HCT VFR BLD AUTO: 34.2 % (ref 41.1–50.3)
HGB BLD-MCNC: 11.3 G/DL (ref 13.6–17.2)
MCH RBC QN AUTO: 30.8 PG (ref 26.1–32.9)
MCHC RBC AUTO-ENTMCNC: 33 G/DL (ref 31.4–35)
MCV RBC AUTO: 93.2 FL (ref 79.6–97.8)
NRBC # BLD: 0 K/UL (ref 0–0.2)
PLATELET # BLD AUTO: 145 K/UL (ref 150–450)
PMV BLD AUTO: 10 FL (ref 9.4–12.3)
POTASSIUM SERPL-SCNC: 3 MMOL/L (ref 3.5–5.1)
POTASSIUM SERPL-SCNC: 4.3 MMOL/L (ref 3.5–5.1)
RBC # BLD AUTO: 3.67 M/UL (ref 4.23–5.6)
SARS COV-2, XPGCVT: NEGATIVE
SODIUM SERPL-SCNC: 145 MMOL/L (ref 138–145)
SOURCE, COVRS: NORMAL
WBC # BLD AUTO: 10 K/UL (ref 4.3–11.1)

## 2020-12-17 PROCEDURE — 74011250637 HC RX REV CODE- 250/637: Performed by: FAMILY MEDICINE

## 2020-12-17 PROCEDURE — 97166 OT EVAL MOD COMPLEX 45 MIN: CPT

## 2020-12-17 PROCEDURE — 74011000250 HC RX REV CODE- 250: Performed by: FAMILY MEDICINE

## 2020-12-17 PROCEDURE — 80048 BASIC METABOLIC PNL TOTAL CA: CPT

## 2020-12-17 PROCEDURE — 97161 PT EVAL LOW COMPLEX 20 MIN: CPT | Performed by: PHYSICAL THERAPIST

## 2020-12-17 PROCEDURE — 2709999900 HC NON-CHARGEABLE SUPPLY

## 2020-12-17 PROCEDURE — 97116 GAIT TRAINING THERAPY: CPT | Performed by: PHYSICAL THERAPIST

## 2020-12-17 PROCEDURE — 85027 COMPLETE CBC AUTOMATED: CPT

## 2020-12-17 PROCEDURE — 70450 CT HEAD/BRAIN W/O DYE: CPT

## 2020-12-17 PROCEDURE — 74011250636 HC RX REV CODE- 250/636: Performed by: FAMILY MEDICINE

## 2020-12-17 PROCEDURE — 65660000000 HC RM CCU STEPDOWN

## 2020-12-17 PROCEDURE — 74011250637 HC RX REV CODE- 250/637: Performed by: INTERNAL MEDICINE

## 2020-12-17 PROCEDURE — 71045 X-RAY EXAM CHEST 1 VIEW: CPT

## 2020-12-17 PROCEDURE — 84132 ASSAY OF SERUM POTASSIUM: CPT

## 2020-12-17 PROCEDURE — 97535 SELF CARE MNGMENT TRAINING: CPT

## 2020-12-17 PROCEDURE — 36415 COLL VENOUS BLD VENIPUNCTURE: CPT

## 2020-12-17 PROCEDURE — 97112 NEUROMUSCULAR REEDUCATION: CPT

## 2020-12-17 RX ORDER — POTASSIUM CHLORIDE 14.9 MG/ML
20 INJECTION INTRAVENOUS
Status: COMPLETED | OUTPATIENT
Start: 2020-12-17 | End: 2020-12-17

## 2020-12-17 RX ORDER — HYDRALAZINE HYDROCHLORIDE 25 MG/1
25 TABLET, FILM COATED ORAL 3 TIMES DAILY
Status: DISCONTINUED | OUTPATIENT
Start: 2020-12-17 | End: 2020-12-18

## 2020-12-17 RX ORDER — FACIAL-BODY WIPES
10 EACH TOPICAL DAILY PRN
Status: DISCONTINUED | OUTPATIENT
Start: 2020-12-17 | End: 2020-12-21 | Stop reason: HOSPADM

## 2020-12-17 RX ORDER — POTASSIUM CHLORIDE 20 MEQ/1
40 TABLET, EXTENDED RELEASE ORAL DAILY
Status: DISCONTINUED | OUTPATIENT
Start: 2020-12-17 | End: 2020-12-19

## 2020-12-17 RX ADMIN — OXYCODONE HYDROCHLORIDE AND ACETAMINOPHEN 500 MG: 500 TABLET ORAL at 09:39

## 2020-12-17 RX ADMIN — Medication 10 ML: at 06:00

## 2020-12-17 RX ADMIN — NICARDIPINE HYDROCHLORIDE 2.5 MG/HR: 2.5 INJECTION, SOLUTION INTRAVENOUS at 07:43

## 2020-12-17 RX ADMIN — HYDRALAZINE HYDROCHLORIDE 25 MG: 50 TABLET, FILM COATED ORAL at 18:05

## 2020-12-17 RX ADMIN — Medication 220 MG: at 09:39

## 2020-12-17 RX ADMIN — FAMOTIDINE 20 MG: 20 TABLET, FILM COATED ORAL at 09:39

## 2020-12-17 RX ADMIN — POTASSIUM CHLORIDE 40 MEQ: 20 TABLET, EXTENDED RELEASE ORAL at 16:49

## 2020-12-17 RX ADMIN — BISACODYL 10 MG: 10 SUPPOSITORY RECTAL at 16:48

## 2020-12-17 RX ADMIN — FLUTICASONE PROPIONATE 2 SPRAY: 50 SPRAY, METERED NASAL at 09:40

## 2020-12-17 RX ADMIN — POTASSIUM CHLORIDE 20 MEQ: 14.9 INJECTION, SOLUTION INTRAVENOUS at 07:49

## 2020-12-17 RX ADMIN — POTASSIUM CHLORIDE 20 MEQ: 14.9 INJECTION, SOLUTION INTRAVENOUS at 09:39

## 2020-12-17 RX ADMIN — Medication 10 ML: at 13:21

## 2020-12-17 RX ADMIN — HYDRALAZINE HYDROCHLORIDE 25 MG: 50 TABLET, FILM COATED ORAL at 22:50

## 2020-12-17 RX ADMIN — LORATADINE 10 MG: 10 TABLET ORAL at 09:39

## 2020-12-17 RX ADMIN — SUCRALFATE 1 G: 1 TABLET ORAL at 18:05

## 2020-12-17 RX ADMIN — SUCRALFATE 1 G: 1 TABLET ORAL at 09:39

## 2020-12-17 RX ADMIN — Medication 10 ML: at 22:51

## 2020-12-17 RX ADMIN — SUCRALFATE 1 G: 1 TABLET ORAL at 22:50

## 2020-12-17 RX ADMIN — OXYCODONE HYDROCHLORIDE AND ACETAMINOPHEN 500 MG: 500 TABLET ORAL at 18:08

## 2020-12-17 RX ADMIN — SUCRALFATE 1 G: 1 TABLET ORAL at 13:20

## 2020-12-17 RX ADMIN — DILTIAZEM HYDROCHLORIDE 120 MG: 120 CAPSULE, COATED, EXTENDED RELEASE ORAL at 09:39

## 2020-12-17 NOTE — PROGRESS NOTES
ACUTE OT GOALS:  (Developed with and agreed upon by patient and/or caregiver.)  1. Patient will complete full body bathing and dressing with SBA, additional time, and adaptive equipment as needed. 2. Patient will complete toileting with mod I.   3. Patient will tolerate 23 minutes of OT treatment with as needed rest breaks to increase activity tolerance for ADLs. 4. Patient will complete functional transfers with SBA and adaptive equipment as needed. 5. Patient will complete grooming tasks in standing at sink level independently.      Timeframe: 7 visits     OCCUPATIONAL THERAPY ASSESSMENT: Initial Assessment, Daily Note and AM   OT Treatment Day # 1    Zeyad Rubalcava is a 81 y.o. 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)  [place]PAT[place]: [place] / [place] B / Product Type: Medicare /   ASSESSMENT:     REHAB RECOMMENDATIONS:   Recommendation to date pending progress:  [address] [place] [address]  Equipment:    To Be Determined     INITIAL/CURRENT LEVEL OF FUNCTION:  (Most Recently Demonstrated) PRIOR LEVEL OF FUNCTION:  (Prior to Hospitalization)   Bathing:   Maximal Assistance  Dressing:   Maximal Assistance  Feeding/Grooming:   Set Up  Toileting:   Total Assistance fowler in place Bathing:   Modified Independent  Dressing:   Modified Independent  Feeding/Grooming:   Independent  Toileting:   Independent     ASSESSMENT:  Mr. Hilaria Dewitt presents in CCU as covid R/O, CT showed Right lobulated cerebellar hemorrhage. Per MD keep SBP <140 which was maintained throughout session. Pt presents with dyskinetic movements in R UE and R LE, demonstrated poor sitting balance, as well as poor standing balance, ambulation,  ADLs, coordination, and poor safety awareness, noted also decreased STM. Unsure of prior level except what Chart reveals. Pt reports his wife helps him occasionally with socks, but otherwise he is Independent with all self care, has RW and uses occasionally, no reported falls, no longer driving, stands for shower and was able to step over into tub with out assistance. Pt reports lightheaded with positional changes today. Trouble donning socks sitting edge of bed, requiring help. Pt wants to go home, unsure if wife can care for him. He reports no other family locally. Functioning below his baseline and could benefit from skilled OT to maximize his rehab potential. Will follow in acute care. SUBJECTIVE:   Mr. Kee Erickson states, \"I am dizzy I guess\".     SOCIAL HISTORY/LIVING ENVIRONMENT:   Home Environment: Private residence  # Steps to Enter: 3  One/Two Story Residence: Two story, live on 1st floor  Living Alone: No  Support Systems: Spouse/Significant Other/Partner   RW  T/S with GBs    OBJECTIVE:     PAIN: VITAL SIGNS: LINES/DRAINS:   Pre Treatment: Pain Screen  Pain Scale 1: Numeric (0 - 10)  Pain Intensity 1: 0  Post Treatment: no complaint of pain   Mcduffie Catheter, IV and full CCU monitoring  O2 Device: Nasal cannula     GROSS EVALUATION:  B UEs, R dominant Within Functional Limits Abnormal/ Functional Abnormal/ Non-Functional (see comments) Not Tested Comments:   AROM [] [x] [] []    PROM [x] [] [] []    Strength [] [x] [] []    Balance [] [] [x] [] Poor sitting and standing balance, ataxic gait   Posture [] [x] [] []    Sensation [] [x] [] [] Fragile skin, multiple healing bruises   Coordination [] [x] [x] [] Poorly coordinated [employer], R seems worse than L, unsure if some is from prior CVA Tone [x] [] [] []    Edema [] [x] [] []    Activity Tolerance [] [x] [] [] Needed rest breaks throughout session, 2 L NC oxygen    [] [] [] []      COGNITION/  PERCEPTION: Intact Impaired   (see comments) Comments:   Orientation [] [x] Not time   Vision [] [x] Wears reading glasses, reports blurry vision   Hearing [x] []    Judgment/ Insight [] [x] Poor safety awareness   Attention [] [x] Short attention span, easily distracted   Memory [] [x] Only able to recall 2/3 words after multiple repetitions throughout session   Command Following [] [x] Impulsive and took off walking before all lines were secured   Emotional Regulation [x] []     [] []      ACTIVITIES OF DAILY LIVING: I Mod I S SBA CGA Min Mod Max Total NT Comments   BASIC ADLs:              Bathing/ Showering [] [] [] [] [] [] [x] [] [] []    Toileting [] [] [] [] [] [] [] [] [x] [] Mcduffie in place   Dressing [] [] [] [] [] [] [x] [] [] [] Poor coordination for donning socks, brushing hair, washing face   Feeding [] [] [x] [] [] [] [] [] [] [] Had eaten breakfast, reports was messy and had trouble finding his mouth   Grooming [] [] [] [] [] [] [x] [] [] []    Personal Device Care [] [] [] [] [] [] [] [x] [] [] Not safe using RW without assist   Functional Mobility [] [] [] [] [] [] [] [x] [] [] Around bed with gait belt and RW, unsafe   INSTRUMENTAL ADLs:              Care of Others [] [] [] [] [] [] [] [] [] [x]    Community Mobility [] [] [] [] [] [] [] [] [] [x]    Financial Management [] [] [] [] [] [] [] [] [] [x]    Home Management [] [] [] [] [] [] [] [] [] [x]    Meal Preparation [] [] [] [] [] [] [] [] [] [x]    Health Management [] [] [] [] [] [] [] [] [] [x]    Work/Leisure [] [] [] [] [] [] [] [] [] [x]    I=Independent, Mod I=Modified Independent, S=Supervision, SBA=Standby Assistance, CGA=Contact Guard Assistance,   Min=Minimal Assistance, Mod=Moderate Assistance, Max=Maximal Assistance, Total=Total Assistance, NT=Not Tested    MOBILITY: I Mod I S SBA CGA Min Mod Max Total  NT x2 Comments:   Supine to sit [] [] [] [] [] [x] [] [] [] [] [x] impulsive and unsafe   Sit to supine [] [] [] [] [] [x] [] [] [] [] [x] impulsive and unsafe   Sit to stand [] [] [] [] [] [x] [] [] [] [] [x] Got dizzy, poor safety   Bed to chair [] [] [] [] [] [] [] [] [] [x] [] RN wanted pt back in bed   I=Independent, Mod I=Modified Independent, S=Supervision, SBA=Standby Assistance, CGA=Contact Guard Assistance,   Min=Minimal Assistance, Mod=Moderate Assistance, Max=Maximal Assistance, Total=Total Assistance, NT=Not [facility] 6 Clicks   Daily Activity Inpatient Short Form        How much help from another person does the patient currently need. .. Total A Lot A Little None   1. Putting on and taking off regular lower body clothing? [] 1   [x] 2   [] 3   [] 4   2. Bathing (including washing, rinsing, drying)? [] 1   [x] 2   [] 3   [] 4   3. Toileting, which includes using toilet, bedpan or urinal?   [x] 1   [] 2   [] 3   [] 4   4. Putting on and taking off regular upper body clothing? [] 1   [x] 2   [] 3   [] 4   5. Taking care of personal grooming such as brushing teeth? [] 1   [] 2   [x] 3   [] 4   6. Eating meals? [] 1   [] 2   [x] 3   [] 4   © 2007, Trustees of [facility] [employer], under license to [employer]. All rights reserved     Score:  Initial: 13, completed 12/17/2020 Most Recent: X (Date: -- )   Interpretation of Tool:  Represents activities that are increasingly more difficult (i.e. Bed mobility, Transfers, Gait). PLAN:   FREQUENCY/DURATION: OT Plan of Care: 3 times/week for duration of hospital stay or until stated goals are met, which ever comes first.    PROBLEM LIST:   (Skilled intervention is medically necessary to address:)  1. Decreased ADL/Functional Activities  2. Decreased Activity Tolerance  3. Decreased AROM/PROM  4. Decreased Balance  5. Decreased Cognition  6. Decreased Coordination  7.  Decreased Gait Ability  8. Decreased Strength  9. Decreased Transfer Abilities   INTERVENTIONS PLANNED:   (Benefits and precautions of occupational therapy have been discussed with the patient.)  1. Self Care Training  2. Therapeutic Activity  3. Therapeutic Exercise/HEP  4. Neuromuscular Re-education  5. Gait Training  6. Education     TREATMENT:     EVALUATION: Moderate Complexity : (Untimed Charge)    TREATMENT:   ($$ Self Care/Home Management: 8-22 mins$$ Neuromuscular Re-Education: 8-22 mins   )  Self Care (20 Minutes): Self care including Upper Body Bathing, Toileting, Upper Body Dressing, Lower Body Dressing, Self Feeding, Grooming, ADL Adaptive Equipment Training and safety awareness to increase independence and decrease level of assistance required. Neuromuscular Re-education (10 Minutes): Neuromuscular Re-education included Balance Training, Coordination training, Postural training, Sitting balance training and Standing balance training to improve Balance, Coordination, Functional Mobility, Postural Control and Proprioception.   Co-Treatment PT/OT necessary due to patient's decreased overall endurance/tolerance levels, as well as need for high level skilled assistance to complete functional transfers/mobility and functional tasks    AFTER TREATMENT POSITION/PRECAUTIONS:  Bed, Needs within reach, RN notified and RN at bedside    INTERDISCIPLINARY COLLABORATION:  RN/PCT, PT/PTA, OT/SALCIDO and RN Case Manager/     TOTAL TREATMENT DURATION:  35 mins  OT Patient Time In/Time Out  Time In: 0921  Time Out: [address] [facility], MS, OTR/L

## 2020-12-17 NOTE — INTERDISCIPLINARY ROUNDS
Interdisciplinary team rounds were held 12/17/2020 with the following team members:Nursing, Nurse Practitioner, Pastoral Care, Pharmacy, Physical Therapy, Physician, Respiratory Therapy and Clinical Coordinator and the patient. Plan of care discussed. See clinical pathway and/or care plan for interventions and desired outcomes.

## 2020-12-17 NOTE — PROGRESS NOTES
Nutrition: Acknowledge Nutrition Consult for Electrolyte Replacement Management. (Dr. Justice Patrick) Labs are remarkable for potassium 3. Nutrition Support Orders for Electrolyte Replacement Management are now activated and on the MAR. The patient will receive 20 mEq IV KCl x 2 today. Follow-up based on standards of care. Jaret Linder. Diana Harris  
[phone]

## 2020-12-17 NOTE — PROGRESS NOTES
Progress Note    Patient: Nino Maldonado MRN: [id number]  SSN: [id number]    YOB: 1935  Age: 81 y.o. Sex: male      Admit Date: 12/15/2020    LOS: 1 day     Subjective:     Patient with PMH of CVA one year ago, with no residual deficit and hypertension. Came in with altered mentation from home. He was found to have severe hypertension in ER. CT showed lobulated R cerebellar hemorrhage. Patient was placed on Cardene drip. Neurosurgery was consulted. Patient is feeling some weakness on the right side, not different from yesterday. He is eating well. Objective:     Vitals:    12/17/20 0900 12/17/20 0916 12/17/20 0930 12/17/20 0955   BP: (!) 118/58 (!) 126/58 130/66 129/66   Pulse: (!) 55 (!) 53 66 62   Resp: 14 15 30 26   Temp:       SpO2: 96% 97% 97% 97%   Weight:       Height:            Intake and Output:  Current Shift: No intake/output data recorded. Last three shifts: 12/15 1901 - 12/17 0700  In: 295 [I.V.:295]  Out: 3600 [Urine:3500]    Physical Exam:     General:                    The patient is a pleasant male in no acute respiratory distress. He is sitting up in bed and eating. Head:                                   Normocephalic/atraumatic. Eyes:                                   No palpebral pallor or scleral icterus. ENT:                                    External auricular and nasal exam within normal limits. Mucous membranes are moist.  Neck:                                   Supple, non-tender, no JVD. Lungs:                       Clear to auscultation bilaterally without wheezes or crackles. No respiratory distress or accessory muscle use. Heart:                                  Regular rate and rhythm, without murmurs, rubs, or gallops. Abdomen:                  Soft, non-tender, non-distended with normoactive bowel sounds.    Genitourinary:           No tenderness over the bladder or bilateral CVAs. Extremities:               Without clubbing, cyanosis, or edema. Skin:                                    Normal color, texture, and turgor. No rashes, lesions, or jaundice. Pulses:                      Radial and dorsalis pedis pulses present 2+ bilaterally. Capillary refill <2s. Neurologic:                CN II-XII grossly intact and symmetrical.                                               Moving all four extremities well with minimally weaker right arm and right leg compared to left arm and left leg. Psychiatric:                Pleasant demeanor, appropriate affect. Alert and oriented x 3      Lab/Data Review:    Recent Results (from the past 24 hour(s))   METABOLIC PANEL, BASIC    Collection Time: 12/17/20  4:39 AM   Result Value Ref Range    Sodium 145 138 - 145 mmol/L    Potassium 3.0 (L) 3.5 - 5.1 mmol/L    Chloride 113 (H) 98 - 107 mmol/L    CO2 28 21 - 32 mmol/L    Anion gap 4 (L) 7 - 16 mmol/L    Glucose 90 65 - 100 mg/dL    BUN 19 8 - 23 MG/DL    Creatinine 1.08 0.8 - 1.5 MG/DL    GFR est AA >60 >60 ml/min/1.73m2    GFR est non-AA >60 >60 ml/min/1.73m2    Calcium 7.5 (L) 8.3 - 10.4 MG/DL   CBC W/O DIFF    Collection Time: 12/17/20  4:39 AM   Result Value Ref Range    WBC 10.0 4.3 - 11.1 K/uL    RBC 3.67 (L) 4.23 - 5.6 M/uL    HGB 11.3 (L) 13.6 - 17.2 g/dL    HCT 34.2 (L) 41.1 - 50.3 %    MCV 93.2 79.6 - 97.8 FL    MCH 30.8 26.1 - 32.9 PG    MCHC 33.0 31.4 - 35.0 g/dL    RDW 13.4 11.9 - 14.6 %    PLATELET 750 (L) 555 - 450 K/uL    MPV 10.0 9.4 - 12.3 FL    ABSOLUTE NRBC 0.00 0.0 - 0.2 K/uL     CT head   12-  IMPRESSION:     1. Stable small right cerebellar hemorrhage.  No significant change compared to  prior exam.     XR chest   12-  IMPRESSION:  Slight increase in interstitial prominence, nonspecific, atypical  pneumonia or possibly edema.         Current Outpatient Medications   Medication Instructions    acetylcarnitine HCl (ACETYL L-CARNITINE PO) Oral    ALPHA LIPOIC ACID PO Oral    aspirin 81 mg, Oral, DAILY    betamethasone dipropionate (DIPROLENE) 0.05 % ointment Topical, 2 TIMES DAILY    cholecalciferol, vitamin D3, (VITAMIN D3) 2,000 unit tab Oral    COQ10, UBIQUINOL, PO Oral    dilTIAZem ER (CARDIZEM CD) 120 mg capsule TAKE 1 CAPSULE BY MOUTH EVERY DAY    docusate sodium (STOOL SOFTENER) 100 mg tab Oral    famotidine (PEPCID) 20 mg, Oral, 2 TIMES DAILY    fexofenadine (ALLEGRA) 180 mg, Oral, DAILY AS NEEDED    fluticasone propionate (FLONASE) 50 mcg/actuation nasal spray SPRAY 2 SPRAYS INTO EACH NOSTRIL EVERY DAY    folic acid-vit M6-IRI R46 2.2-25-1 mg tab Oral    nystatin-triamcinolone (MYCOLOG II) topical cream Topical, 2 TIMES DAILY AS NEEDED    sucralfate (CARAFATE) 0.5 g, Oral, 4 TIMES DAILY    triamcinolone acetonide (KENALOG) 0.025 % topical cream Topical, 2 TIMES DAILY, use thin layer     TURMERIC PO Oral       Current Facility-Administered Medications:     potassium chloride 20 mEq in 100 ml IVPB, 20 mEq, IntraVENous, Q2H, Roseann Beltran MD, Last Rate: 50 mL/hr at 12/17/20 0939, 20 mEq at 12/17/20 0939    NUTRITIONAL SUPPORT ELECTROLYTE PRN ORDERS, , Does Not Apply, PRN, Mak Diaz MD    dilTIAZem ER (CARDIZEM CD) capsule 120 mg, 120 mg, Oral, DAILY, Roseann Beltran MD, 120 mg at 12/17/20 7360    sodium chloride (NS) flush 5-40 mL, 5-40 mL, IntraVENous, Q8H, Roseann Beltran MD, 10 mL at 12/17/20 0600    sodium chloride (NS) flush 5-40 mL, 5-40 mL, IntraVENous, PRN, Roseann Beltran MD    acetaminophen (TYLENOL) tablet 650 mg, 650 mg, Oral, Q6H PRN, 650 mg at 12/16/20 2154 **OR** acetaminophen (TYLENOL) suppository 650 mg, 650 mg, Rectal, Q6H PRN, Roseann Beltran MD    polyethylene glycol (MIRALAX) packet 17 g, 17 g, Oral, DAILY PRN, Roseann Beltran MD    [DISCONTINUED] promethazine (PHENERGAN) tablet 12.5 mg, 12.5 mg, Oral, Q6H PRN, 12.5 mg at 12/16/20 0615 **OR** ondansetron (ZOFRAN) injection 4 mg, 4 mg, IntraVENous, Q6H PRN, Tamy Mar MD, 4 mg at 12/16/20 1419    niCARdipine in Saline (CARDENE) 25 MG/250 mL infusion kit, 0-15 mg/hr, IntraVENous, TITRATE, Tamy Mar MD, Last Rate: 25 mL/hr at 12/17/20 0743, 2.5 mg/hr at 12/17/20 0743    famotidine (PEPCID) tablet 20 mg, 20 mg, Oral, DAILY, Tamy Mar MD, 20 mg at 12/17/20 1071    loratadine (CLARITIN) tablet 10 mg, 10 mg, Oral, DAILY, Odalys Smith DO, 10 mg at 12/17/20 9084    fluticasone propionate (FLONASE) 50 mcg/actuation nasal spray 2 Spray, 2 Spray, Both Nostrils, DAILY, Negra Whitt, , 2 Spray at 12/17/20 0940    sucralfate (CARAFATE) tablet 1 g, 1 g, Oral, QID, Odalys Smith DO, 1 g at 12/17/20 3197    ascorbic acid (vitamin C) (VITAMIN C) tablet 500 mg, 500 mg, Oral, BID, Odalys Smith, DO, 500 mg at 12/17/20 7735    zinc sulfate tablet 220 mg, 220 mg, Oral, DAILY, Lacey Whitt, DO, 220 mg at 12/17/20 0939    albuterol (PROVENTIL HFA, VENTOLIN HFA, PROAIR HFA) inhaler 2 Puff, 2 Puff, Inhalation, Q6H PRN, Odalys Smith DO      Assessment:     Principal Problem:    Suspected COVID-19 virus infection (12/16/2020)    Active Problems:    Cerebellar bleed ([address] (12/16/2020)      Hypertensive emergency (12/16/2020)        Plan:     Suspected COVID infection   Rapid test is negative. Pending PCR result. Cerebellar bleeding. No change from initial CT head. Monitor. Hypertensive urgency   BP is better now. Monitor blood pressure and manage accordingly. Continue current medications. I have discussed the plan of care with patient. I have called Eris Pantoja, sister, on the phone and updated her in detail about the patient.      DVT prophylaxis : SCD     Signed By: Noni Fair MD     December 17, 2020

## 2020-12-17 NOTE — CONSULTS
Nutrition:   Acknowledge Nutrition Consult for Electrolyte Replacement Management. (Dr. Stephani Parsons)   Labs are remarkable for potassium 3. Nutrition Support Orders for Electrolyte Replacement Management are now activated and on the MAR. The patient will receive 20 mEq IV KCl x 2 today. Follow-up based on standards of care. Cathryne Boast.  Hayde Pearce   [phone]

## 2020-12-17 NOTE — PROGRESS NOTES
Bedside and Verbal shift change report given to Robyn Clarke RN (oncoming nurse) by Manjula Walsh RN (offgoing nurse). Report included the following information SBAR, Kardex, ED Summary, MAR, Recent Results, Cardiac Rhythm SB, Alarm Parameters  and Dual Neuro Assessment.

## 2020-12-17 NOTE — PROGRESS NOTES
TRANSFER - OUT REPORT:    Verbal report given to SOWMYA Santos(name) on Henriette Ormond  being transferred to [facility](unit) for routine progression of care       Report consisted of patients Situation, Background, Assessment and   Recommendations(SBAR). Information from the following report(s) SBAR, Kardex, Recent Results, Cardiac Rhythm NSR/SB and Quality Measures was reviewed with the receiving nurse. Lines:   Peripheral IV 12/16/20 Right Antecubital (Active)   Site Assessment Clean, dry, & intact 12/17/20 1530   Phlebitis Assessment 0 12/17/20 1530   Infiltration Assessment 0 12/17/20 1530   Dressing Status Clean, dry, & intact 12/17/20 1530   Dressing Type Tape;Transparent 12/17/20 1530   Hub Color/Line Status Patent; Flushed 12/17/20 1530   Alcohol Cap Used No 12/17/20 1530       Peripheral IV 12/16/20 Left Antecubital (Active)   Site Assessment Clean, dry, & intact 12/17/20 1530   Phlebitis Assessment 0 12/17/20 1530   Infiltration Assessment 0 12/17/20 1530   Dressing Status Clean, dry, & intact 12/17/20 1530   Dressing Type Tape;Transparent 12/17/20 1530   Hub Color/Line Status Blue;Patent; Flushed 12/17/20 1530   Alcohol Cap Used No 12/17/20 1530        Opportunity for questions and clarification was provided.       Patient transported with:   Monitor  Registered Nurse  Tech

## 2020-12-17 NOTE — PROGRESS NOTES
ACUTE PHYSICAL THERAPY GOALS:  (Developed with and agreed upon by patient and/or caregiver. )    LTG:  (1.)Mr. Leroy Davidson will move from supine to sit and sit to supine , scoot up and down and roll side to side in bed with INDEPENDENT within 7 treatment day(s). (2.)Mr. Leroy Davidson will transfer from bed to chair and chair to bed with MINIMAL ASSIST using the least restrictive device within 7 treatment day(s). (3.)Mr. Corrales will ambulate with MINIMAL ASSIST for 50 feet with the least restrictive device within 7 treatment day(s). (4.)Mr. Corrales will tolerate at least 23 min of dynamic standing activity to assist standing ADLs with the least restrictive device within 7 treatment days. (5.)Mr. Corrales will climb at least 3 steps with MODERATE ASSIST with the least restrictive device within 7 treatment days.     ________________________________________________________________________________________________        PHYSICAL THERAPY ASSESSMENT: Initial Assessment, Daily Note and AM PT Treatment Day # 1      Amanda Spencer is a 81 y.o. male   PRIMARY DIAGNOSIS: Suspected COVID-19 virus infection       Reason for Referral:    ICD-10: Treatment Diagnosis: Generalized Muscle Weakness (M62.81)  Other lack of cordination (R27.8)  Difficulty in walking, Not elsewhere classified (R26.2)  Other abnormalities of gait and mobility (R26.89)  Unspecified Lack of Coordination (R27.9)  [place]PAT[place]: [place] / [place] / Product Type: Medicare /     ASSESSMENT:     REHAB RECOMMENDATIONS:   Recommendation to date pending progress:  [address] [place] [address] vs. Short-term Rehab  Equipment:    To Be Determined     INITIAL/CURRENT LEVEL OF FUNCTION:  (Most Recently Demonstrated) PRIOR LEVEL OF FUNCTION:  (Prior to Hospitalization)   Bed Mobility:   Contact Guard Assistance  Sit to Stand:   Minimal Assistance x 2  Transfers:   Minimal Assistance x 2  Gait/Mobility:   Moderate Assistance Bed Mobility:   Independent  Sit to Stand:   Modified Independent  Transfers:   Modified Independent  Gait/Mobility:   Unknown     ASSESSMENT:  Mr. Dwight Aranda  Presents in supine w/ mild L facial droop and decreased short term memory, but A&Ox4. He moves to EOB w/ CGA, but requires mod-max support from PT for trunk control in sitting while OT cues sock donning. He stands w/ min Ax2 and RW abruptly and with poor safety awareness, before ambulating 2 x12 ft w/ RW, chair follow, and mod A for posture and step clearance. At end of session, pnt is supine in bed with all needs within reach, RW present. SUBJECTIVE:   Mr. Dwight Aranda states, \"I'm ready\".     SOCIAL HISTORY/LIVING ENVIRONMENT:   Home Environment: Private residence  # Steps to Enter: 3  One/Two Story Residence: Two story, live on 1st floor  Living Alone: No  Support Systems: Spouse/Significant Other/Partner  OBJECTIVE:     PAIN: VITAL SIGNS: LINES/DRAINS:   Pre Treatment: Pain Screen  Pain Scale 1: Numeric (0 - 10)  Pain Intensity 1: 0  Post Treatment: 0   Mcduffie Catheter  O2 Device: Nasal cannula     GROSS EVALUATION:   Within Functional Limits Abnormal/ Functional Abnormal/ Non-Functional (see comments) Not Tested Comments:   AROM [] [x] [] []    PROM [] [x] [] []    Strength [] [x] [] []    Balance [] [] [x] []  very poor sitting balance, path deviations in walking   Posture [] [x] [] []    Sensation [] [x] [] []    Coordination [] [] [x] []  R UE dyskinesia and overshooting   Tone [] [x] [] []    Edema [x] [] [] []    Activity Tolerance [] [x] [] []     [] [] [] []      COGNITION/  PERCEPTION: Intact Impaired   (see comments) Comments:   Orientation [x] []    Vision [x] []    Hearing [x] []    Command Following [] [x] impulsive   Safety Awareness [] [x]     [] []      MOBILITY: I Mod I S SBA CGA Min Mod Max Total  NT x2 Comments:   Bed Mobility    Rolling [] [] [] [] [x] [] [] [] [] [] []    Supine to Sit [] [] [] [] [x] [] [] [] [] [] []    Scooting [] [] [] [] [x] [] [] [] [] [] []    Sit to Supine [] [] [] [] [x] [] [] [] [] [] []    Transfers    Sit to Stand [] [] [] [] [] [x] [] [] [] [] [x]    Bed to Chair [] [] [] [] [] [] [] [] [] [x] []    Stand to Sit [] [] [] [] [] [x] [] [] [] [] [x]    I=Independent, Mod I=Modified Independent, S=Supervision, SBA=Standby Assistance, CGA=Contact Guard Assistance,   Min=Minimal Assistance, Mod=Moderate Assistance, Max=Maximal Assistance, Total=Total Assistance, NT=Not Tested  GAIT: I Mod I S SBA CGA Min Mod Max Total  NT Comments:   Level of Assistance [] [] [] [] [] [] [x] [] [] []  w/ chair follow   Distance 2 x 12 ft    DME Rolling Walker and Gait Belt    Gait Quality Path deviations, trunk sway    I=Independent, Mod I=Modified Independent, S=Supervision, SBA=Standby Assistance, CGA=Contact Guard Assistance,   Min=Minimal Assistance, Mod=Moderate Assistance, Max=Maximal Assistance, Total=Total Assistance, NT=Not Tested    [address] [facility] [place] [facility]\"" [address] [address] [facility]       How much difficulty does the patient currently have. .. Unable A Lot A Little None   1. Turning over in bed (including adjusting bedclothes, sheets and blankets)? [] 1   [] 2   [x] 3   [] 4   2. Sitting down on and standing up from a chair with arms ( e.g., wheelchair, bedside commode, etc.)   [] 1   [] 2   [x] 3   [] 4   3. Moving from lying on back to sitting on the side of the bed? [] 1   [] 2   [x] 3   [] 4   How much help from another person does the patient currently need. .. Total A Lot A Little None   4. Moving to and from a bed to a chair (including a wheelchair)? [] 1   [x] 2   [] 3   [] 4   5. Need to walk in hospital room? [] 1   [x] 2   [] 3   [] 4   6. Climbing 3-5 steps with a railing? [] 1   [x] 2   [] 3   [] 4   © 2007, Trustees of [address] [place] [address] [address] [id number], under license to [employer].  All rights reserved     Score:  Initial: 15 Most Recent: X (Date: -- )    Interpretation of Tool:  Represents activities that are increasingly more difficult (i.e. Bed mobility, Transfers, Gait). PLAN:   FREQUENCY/DURATION: PT Plan of Care: 3 times/week for duration of hospital stay or until stated goals are met, which ever comes first.    PROBLEM LIST:   (Skilled intervention is medically necessary to address:)  1. Decreased ADL/Functional Activities  2. Decreased Activity Tolerance  3. Decreased AROM/PROM  4. Decreased Balance  5. Decreased Cognition  6. Decreased Coordination  7. Decreased Gait Ability  8. Decreased Strength  9. Decreased Transfer Abilities   INTERVENTIONS PLANNED:   (Benefits and precautions of physical therapy have been discussed with the patient.)  1. Therapeutic Activity  2. Therapeutic Exercise/HEP  3. Neuromuscular Re-education  4. Gait Training  5. Manual Therapy  6. Education     TREATMENT:     EVALUATION: Low Complexity : (Untimed Charge)    TREATMENT:   ($$ Gait Trainin-37 mins    )  Gait Training (25 Minutes): Gait training for 2 x 12 feet utilizing Rolling Walker and [employer]. Patient required Manual, Tactile, Verbal and Visual cueing to improve Activity Pacing, Assistive Device Utilization, Dynamic Standing Balance, Gait Mechanics and overall safety- pnt is unsteady on feet and very impulsive.    Co-Treatment PT/OT necessary due to patient's decreased overall endurance/tolerance levels, as well as need for high level skilled assistance to complete functional transfers/mobility and functional tasks    AFTER TREATMENT POSITION/PRECAUTIONS:  Alarm Activated, Bed, Needs within reach and RN notified    INTERDISCIPLINARY COLLABORATION:  RN/PCT, PT/PTA and OT/SALCIDO    TOTAL TREATMENT DURATION:  PT Patient Time In/Time Out  Time In: Giovanny   Time Out: [address] [facility]

## 2020-12-17 NOTE — PROGRESS NOTES
Bedside and Verbal shift change report given to Karina H[address] (oncoming nurse) by Villa Burrows (offgoing nurse). Report included the following information SBAR, Kardex, Intake/Output, MAR, Recent Results, Cardiac Rhythm NSR/SB and Alarm Parameters .     Dual neuro assessment completed

## 2020-12-17 NOTE — PROGRESS NOTES
Bedside shift report received from Edwige Morton [facility]. Pt alert and oriented x4. Sinus mónica on monitor. SBP < 140. On cardene gtt. Dual neuro assessment performed.

## 2020-12-18 PROBLEM — I16.1 HYPERTENSIVE EMERGENCY: Status: RESOLVED | Noted: 2020-12-16 | Resolved: 2020-12-18

## 2020-12-18 PROBLEM — Z20.822 SUSPECTED COVID-19 VIRUS INFECTION: Status: RESOLVED | Noted: 2020-12-16 | Resolved: 2020-12-18

## 2020-12-18 LAB
ANION GAP SERPL CALC-SCNC: 3 MMOL/L (ref 7–16)
BUN SERPL-MCNC: 25 MG/DL (ref 8–23)
CALCIUM SERPL-MCNC: 9.3 MG/DL (ref 8.3–10.4)
CHLORIDE SERPL-SCNC: 110 MMOL/L (ref 98–107)
CO2 SERPL-SCNC: 33 MMOL/L (ref 21–32)
CREAT SERPL-MCNC: 1.21 MG/DL (ref 0.8–1.5)
ERYTHROCYTE [DISTWIDTH] IN BLOOD BY AUTOMATED COUNT: 13.5 % (ref 11.9–14.6)
GLUCOSE SERPL-MCNC: 102 MG/DL (ref 65–100)
HCT VFR BLD AUTO: 38.6 % (ref 41.1–50.3)
HGB BLD-MCNC: 12.8 G/DL (ref 13.6–17.2)
MCH RBC QN AUTO: 31.4 PG (ref 26.1–32.9)
MCHC RBC AUTO-ENTMCNC: 33.2 G/DL (ref 31.4–35)
MCV RBC AUTO: 94.6 FL (ref 79.6–97.8)
NRBC # BLD: 0 K/UL (ref 0–0.2)
PLATELET # BLD AUTO: 173 K/UL (ref 150–450)
PMV BLD AUTO: 9.7 FL (ref 9.4–12.3)
POTASSIUM SERPL-SCNC: 3.9 MMOL/L (ref 3.5–5.1)
RBC # BLD AUTO: 4.08 M/UL (ref 4.23–5.6)
SODIUM SERPL-SCNC: 146 MMOL/L (ref 136–145)
WBC # BLD AUTO: 8.5 K/UL (ref 4.3–11.1)

## 2020-12-18 PROCEDURE — 99233 SBSQ HOSP IP/OBS HIGH 50: CPT | Performed by: PSYCHIATRY & NEUROLOGY

## 2020-12-18 PROCEDURE — 36415 COLL VENOUS BLD VENIPUNCTURE: CPT

## 2020-12-18 PROCEDURE — 74011250637 HC RX REV CODE- 250/637: Performed by: INTERNAL MEDICINE

## 2020-12-18 PROCEDURE — 80048 BASIC METABOLIC PNL TOTAL CA: CPT

## 2020-12-18 PROCEDURE — 2709999900 HC NON-CHARGEABLE SUPPLY

## 2020-12-18 PROCEDURE — 74011000302 HC RX REV CODE- 302: Performed by: INTERNAL MEDICINE

## 2020-12-18 PROCEDURE — 74011250637 HC RX REV CODE- 250/637: Performed by: FAMILY MEDICINE

## 2020-12-18 PROCEDURE — 86580 TB INTRADERMAL TEST: CPT | Performed by: INTERNAL MEDICINE

## 2020-12-18 PROCEDURE — 85027 COMPLETE CBC AUTOMATED: CPT

## 2020-12-18 PROCEDURE — 65660000000 HC RM CCU STEPDOWN

## 2020-12-18 RX ORDER — LOSARTAN POTASSIUM 50 MG/1
50 TABLET ORAL DAILY
Status: DISCONTINUED | OUTPATIENT
Start: 2020-12-18 | End: 2020-12-18

## 2020-12-18 RX ORDER — GUAIFENESIN 100 MG/5ML
81 LIQUID (ML) ORAL DAILY
Status: DISCONTINUED | OUTPATIENT
Start: 2020-12-18 | End: 2020-12-18

## 2020-12-18 RX ORDER — HYDRALAZINE HYDROCHLORIDE 25 MG/1
25 TABLET, FILM COATED ORAL
Status: DISCONTINUED | OUTPATIENT
Start: 2020-12-18 | End: 2020-12-19

## 2020-12-18 RX ORDER — ASPIRIN 81 MG/1
81 TABLET ORAL DAILY
Status: DISCONTINUED | OUTPATIENT
Start: 2020-12-19 | End: 2020-12-21 | Stop reason: HOSPADM

## 2020-12-18 RX ORDER — LOSARTAN POTASSIUM 25 MG/1
25 TABLET ORAL DAILY
Status: DISCONTINUED | OUTPATIENT
Start: 2020-12-18 | End: 2020-12-18

## 2020-12-18 RX ORDER — HYDRALAZINE HYDROCHLORIDE 50 MG/1
50 TABLET, FILM COATED ORAL
Status: DISCONTINUED | OUTPATIENT
Start: 2020-12-18 | End: 2020-12-19

## 2020-12-18 RX ORDER — VALSARTAN 80 MG/1
80 TABLET ORAL DAILY
Status: DISCONTINUED | OUTPATIENT
Start: 2020-12-19 | End: 2020-12-19

## 2020-12-18 RX ADMIN — Medication 10 ML: at 05:56

## 2020-12-18 RX ADMIN — TUBERCULIN PURIFIED PROTEIN DERIVATIVE 5 UNITS: 5 INJECTION, SOLUTION INTRADERMAL at 12:36

## 2020-12-18 RX ADMIN — FAMOTIDINE 20 MG: 20 TABLET, FILM COATED ORAL at 09:10

## 2020-12-18 RX ADMIN — HYDRALAZINE HYDROCHLORIDE 50 MG: 50 TABLET, FILM COATED ORAL at 22:17

## 2020-12-18 RX ADMIN — FLUTICASONE PROPIONATE 2 SPRAY: 50 SPRAY, METERED NASAL at 09:14

## 2020-12-18 RX ADMIN — Medication 10 ML: at 22:31

## 2020-12-18 RX ADMIN — Medication 10 ML: at 12:40

## 2020-12-18 RX ADMIN — LOSARTAN POTASSIUM 50 MG: 50 TABLET, FILM COATED ORAL at 09:10

## 2020-12-18 RX ADMIN — POTASSIUM CHLORIDE 40 MEQ: 20 TABLET, EXTENDED RELEASE ORAL at 09:10

## 2020-12-18 RX ADMIN — DILTIAZEM HYDROCHLORIDE 120 MG: 120 CAPSULE, COATED, EXTENDED RELEASE ORAL at 09:10

## 2020-12-18 NOTE — PROGRESS NOTES
Care Management Interventions  PCP Verified by CM: Yes(Dr Digna Cerna)  Last Visit to PCP: 12/10/20  Mode of Transport at Discharge: Other (see comment)(Holly Corrales Spouse  )  Transition of Care Consult (CM Consult): Discharge Planning  Discharge Durable Medical Equipment: No  Physical Therapy Consult: Yes  Occupational Therapy Consult: Yes  Current Support Network: Lives with Spouse, Own Home  Confirm Follow Up Transport: Family  The Plan for Transition of Care is Related to the Following Treatment Goals : STR vs IRC  The Patient and/or Patient Representative was Provided with a Choice of Provider and Agrees with the Discharge Plan?: Yes  Name of the Patient Representative Who was Provided with a Choice of Provider and Agrees with the Discharge Plan: Spouse, [address] [place] [address] of Choice List was Provided with Basic Dialogue that Supports the Patient's Individualized Plan of Care/Goals, Treatment Preferences and Shares the Quality Data Associated with the Providers?: Yes  [place] Resource Information Provided?: No  Discharge Location  Discharge Placement: Unable to determine at this time    Pt admitted with cerebellar hemorrhage. Pt lives with his spouse in a two story house, 2 [address] and 15 stairs to . Pt had no DME needs prior to discharge. Pt's demographic and insurance verified. DCP awaiting PT/OT evaluations. STR preferences obtained from pt's spouse; 1. Gregory Herron 2. Lobo Correa 13. Await PT/OT evaluations. Referrals sent. PPD active.

## 2020-12-18 NOTE — ROUTINE PROCESS
Bedside and Verbal shift change report received from Fernando Dee, [facility] (offgoing nurse) to self (oncoming nurse). Report included the following information SBAR, Kardex, Intake/Output, MAR, Recent Results, and Cardiac Rhythm NSR.

## 2020-12-18 NOTE — ROUTINE PROCESS
Bedside and verbal shift change report given to [facility], RN (oncoming nurse) by self (offgoing nurse). Report included the following information SBAR, Kardex, Intake/Output, MAR, Recent Results, and Cardiac Rhythm NSR.

## 2020-12-18 NOTE — ROUTINE PROCESS
[facility] [address] Report included the following information SBAR, Kardex, Intake/Output, MAR and Recent Results. Pt not on unit yet.

## 2020-12-18 NOTE — PROGRESS NOTES
Problem: Pressure Injury - Risk of  Goal: *Prevention of pressure injury  Description: Document Jeremias Scale and appropriate interventions in the flowsheet.   Outcome: Progressing Towards Goal  Note: Pressure Injury Interventions:  Sensory Interventions: Keep linens dry and wrinkle-free, Maintain/enhance activity level, Pressure redistribution bed/mattress (bed type), Float heels    Moisture Interventions: Absorbent underpads, Minimize layers    Activity Interventions: Increase time out of bed, Pressure redistribution bed/mattress(bed type)    Mobility Interventions: HOB 30 degrees or less, Pressure redistribution bed/mattress (bed type)    Nutrition Interventions: Document food/fluid/supplement intake    Friction and Shear Interventions: Apply protective barrier, creams and emollients, Foam dressings/transparent film/skin sealants, Minimize layers

## 2020-12-18 NOTE — PROGRESS NOTES
Problem: Falls - Risk of  Goal: *Absence of Falls  Description: Document Clydene Bone Fall Risk and appropriate interventions in the flowsheet.   Outcome: Progressing Towards Goal  Note: Fall Risk Interventions:  Mobility Interventions: Bed/chair exit alarm, Communicate number of staff needed for ambulation/transfer, Patient to call before getting OOB, Strengthening exercises (ROM-active/passive)    Mentation Interventions: Adequate sleep, hydration, pain control, Bed/chair exit alarm, Door open when patient unattended, Evaluate medications/consider consulting pharmacy, More frequent rounding    Medication Interventions: Assess postural VS orthostatic hypotension, Bed/chair exit alarm, Evaluate medications/consider consulting pharmacy, Patient to call before getting OOB, Teach patient to arise slowly    Elimination Interventions: Bed/chair exit alarm, Call light in reach, Patient to call for help with toileting needs    History of Falls Interventions: Bed/chair exit alarm, Door open when patient unattended, Evaluate medications/consider consulting pharmacy

## 2020-12-18 NOTE — CONSULTS
Consult    Patient: Remington Lee MRN: [id number]     YOB: 1935  Age: 81 y.o. Sex: male      Subjective:      Remington Lee is a 81 y.o. male who is being seen for hemorrhagic stroke. The patient presented to the emergency department with ataxia/clumsiness on the right. CT scan of the head revealed a hemorrhage in the right cerebellum. Repeat imaging did not show expansion of this bleed. The patient continues to report ataxia in the right upper limb and right lower limb. He has difficulty using these limbs because of his poor coordination.     Past Medical History:   Diagnosis Date    Abdominal pain 5/9/2016    Acute prostatitis     Backache, unspecified     Calculus of kidney     Cancer ([address]     prostate    Diabetes ([address]     hypoglycemia    Diverticulosis 1/14/14    Edema 5/9/2016    Elevated prostate specific antigen (PSA)     Essential hypertension, benign     GERD (gastroesophageal reflux disease)     H. pylori infection 12/1/14    s/p treatment    History of barium enema 3/18/14    ordered by Dr. Bernie Barry, GI    History of BPH     Hypertension     Hypertrophy of prostate without urinary obstruction and other lower urinary tract symptoms (LUTS)     Impotence of organic origin     Lumbago     Malignant neoplasm of prostate ([address]     Microscopic hematuria     Multiple renal cysts 1/14/14    left kidney, by CT    Osteopenia determined by x-ray     Dexa, T Score -1.0    Other ill-defined conditions(799.89)     chronic back pain    PVCs (premature ventricular contractions) 5/9/2016    SSS (sick sinus syndrome) ([address] 5/9/2016     Past Surgical History:   Procedure Laterality Date    HX CHOLECYSTECTOMY      HX COLONOSCOPY  2010    2 polyps benign    HX ENDOSCOPY  07/12/2017    HX HERNIA REPAIR      midline incisional    HX OTHER SURGICAL      prostate; melanoma also-back; hernia    HX RADICAL PROSTATECTOMY      HX TONSIL AND ADENOIDECTOMY      HX UROLOGICAL      cystoscopy with urethral dilatation      Family History   Problem Relation Age of Onset    Alzheimer Mother     Prostate Cancer Father      Social History     Tobacco Use    Smoking status: Former Smoker     Packs/day: 2.00     Years: 22.00     Pack years: 44.00     Types: Cigarettes     Quit date: 3/5/1980     Years since quittin.8    Smokeless tobacco: Never Used   Substance Use Topics    Alcohol use:  Yes     Alcohol/week: 0.0 standard drinks     Comment: 2-3 times weekly -one glass of wine      Current Facility-Administered Medications   Medication Dose Route Frequency Provider Last Rate Last Admin    [START ON 2020] valsartan (DIOVAN) tablet 80 mg  80 mg Oral DAILY Saud Birmingham MD        NUTRITIONAL SUPPORT ELECTROLYTE PRN ORDERS   Does Not Apply TESFAYEN Nelly Lester MD        potassium chloride (K-DUR, KLOR-CON) SR tablet 40 mEq  40 mEq Oral DAILY Marquis Isael MD   40 mEq at 20 0910    bisacodyL (DULCOLAX) suppository 10 mg  10 mg Rectal DAILY PRN Marquis Isael MD   10 mg at 20 1648    dilTIAZem ER (CARDIZEM CD) capsule 120 mg  120 mg Oral DAILY Mickey Kennedy MD   120 mg at 20 0910    sodium chloride (NS) flush 5-40 mL  5-40 mL IntraVENous Q8H Mickey Kennedy MD   10 mL at 20 0556    sodium chloride (NS) flush 5-40 mL  5-40 mL IntraVENous PRN Mickey Kennedy MD        acetaminophen (TYLENOL) tablet 650 mg  650 mg Oral Q6H PRN Mickey Kennedy MD   650 mg at 20 2154    Or    acetaminophen (TYLENOL) suppository 650 mg  650 mg Rectal Q6H PRN Mickey Kennedy MD        polyethylene glycol (MIRALAX) packet 17 g  17 g Oral DAILY PRN Mickey Kennedy MD        ondansetron [facility]) injection 4 mg  4 mg IntraVENous Q6H PRN Mickey Kennedy MD   4 mg at 20 1419    famotidine (PEPCID) tablet 20 mg  20 mg Oral DAILY Mickey Kennedy MD   20 mg at 20 0910    loratadine (CLARITIN) tablet 10 mg  10 mg Oral DAILY Augusto Spillers, DO   10 mg at 12/17/20 8684    fluticasone propionate (FLONASE) 50 mcg/actuation nasal spray 2 Spray  2 Spray Both Nostrils DAILY Augusto Spillers, DO   2 Spray at 12/18/20 0914    albuterol (PROVENTIL HFA, VENTOLIN HFA, PROAIR HFA) inhaler 2 Puff  2 Puff Inhalation Q6H PRN Augusto Spillers, DO            Allergies   Allergen Reactions    Amiodarone Rash    Benazepril Unknown (comments)    Hydrochlorothiazide Other (comments)     Hand redness and swelling.  Lisinopril Cough       Review of Systems:  CONSTITUTIONAL: No weight loss, fever, chills, weakness or fatigue. HEENT: Eyes: No visual loss, blurred vision, double vision or yellow sclerae. Ears, Nose, Throat: No hearing loss, sneezing, congestion, runny nose or sore throat. SKIN: No rash or itching. CARDIOVASCULAR: No chest pain, chest pressure or chest discomfort. No palpitations or edema. RESPIRATORY: No shortness of breath. Positive cough  GASTROINTESTINAL: No anorexia, nausea, vomiting or diarrhea. No abdominal pain or blood. GENITOURINARY: no burning with urination. NEUROLOGICAL: No headache, dizziness, syncope, paralysis, numbness or tingling in the extremities. No change in bowel or bladder control. MUSCULOSKELETAL: No muscle, back pain, joint pain or stiffness. HEMATOLOGIC: No anemia, bleeding or bruising. LYMPHATICS: No enlarged nodes. No history of splenectomy. PSYCHIATRIC: No history of depression or anxiety. ENDOCRINOLOGIC: No reports of sweating, cold or heat intolerance. No polyuria or polydipsia. ALLERGIES: No history of asthma, hives, eczema or rhinitis.         Objective:     Vitals:    12/17/20 2250 12/18/20 0053 12/18/20 0509 12/18/20 0800   BP:  (!) 148/78 (!) 154/76 (!) 158/81   Pulse: 63 (!) 53 67 67   Resp:  19 17 18   Temp:  98.6 °F (37 °C) 98.4 °F (36.9 °C) 98.2 °F (36.8 °C)   SpO2:  94% 93% 96%   Weight:   157 lb 4.8 oz (71.4 kg)    Height:            Physical Exam:  General - Well developed, well nourished, in no apparent distress. Pleasant and conversant. HEENT - Normocephalic, atraumatic. Conjunctiva, tympanic membranes, and oropharynx are clear. Neck - Supple without masses, no bruits   Cardiovascular - Regular rate and rhythm. Normal S1, S2 without murmurs, rubs, or gallops. Lungs - Clear to auscultation. Abdomen - Soft, nontender with normal bowel sounds. Extremities - Peripheral pulses intact. No edema and no rashes. Neurological examination - Comprehension, attention, memory and reasoning are intact. Language and speech are normal. On cranial nerve examination pupils are equal round and reactive to light. Fundoscopic examination is normal. Visual acuity is adequate. Visual fields are full to finger confrontation. Extraocular motility is normal. Face is symmetric and sensation is intact to light touch. Hearing is intact to finger rustle bilaterally. Motor examination - There is normal muscle tone and bulk. Power is full throughout but he is limited with his right upper limb and right lower limb in the setting of severe ataxia. Muscle stretch reflexes are normoactive and there are no pathological reflexes present. Sensation is intact to light touch, pinprick, vibration and proprioception in all extremities. Cerebellar examination shows ataxia and dysmetria in the right upper limb and right lower limb. Gait and stance not assessed due to fall risk.       Lab Results   Component Value Date/Time    Cholesterol, total 166 09/17/2020 10:43 AM    HDL Cholesterol 70 09/17/2020 10:43 AM    LDL, calculated 37 05/22/2020 09:16 AM    LDL Chol Calc (NIH) 82 09/17/2020 10:43 AM    VLDL, calculated 8 05/22/2020 09:16 AM    VLDL Cholesterol Yefri 14 09/17/2020 10:43 AM    Triglyceride 76 09/17/2020 10:43 AM    CHOL/HDL Ratio 2.4 12/04/2019 03:41 AM        Lab Results   Component Value Date/Time    Hemoglobin A1c 5.7 (H) 09/17/2020 10:43 AM    Hemoglobin A1c (POC) 6 10/14/2020 02:30 PM     CT Results (most recent): Personally Reviewed   Results from Hospital Encounter encounter on 12/15/20   CT HEAD WO CONT    Narrative History: repeat CT head 6 hrs after previous CT head. Right cerebellar  hemorrhage     Exam: CT head without contrast    Technique: Thin section axial CT images were obtained from the skullbase through  the vertex. Radiation dose reduction techniques were used for this study.  Our  CT scanners use one or all of the following: Automated exposure control,  adjustment of the mA and/or kV according to patient size, use of iterative  reconstruction.    COMPARISON: 12/15/2020    Findings: Persistent right cerebellar intraparenchymal hemorrhage noted,  unchanged when compared with the prior exam. There is ectasia of the basilar  artery, as was seen previously. Chronic appearing white matter hypodensity noted  in the corona radiata and centrum semiovale. No significant mass effect or  midline shift. There is mucosal thickening of the maxillary sinuses. The exam is  somewhat limited due to patient motion.      Impression Impression: Stable exam with persistent right cerebellar hemorrhage.           Results for orders placed or performed during the hospital encounter of 12/15/20   EKG, 12 LEAD, INITIAL   Result Value Ref Range    Ventricular Rate 83 BPM    Atrial Rate 83 BPM    P-R Interval 178 ms    QRS Duration 104 ms    Q-T Interval 388 ms    QTC Calculation (Bezet) 455 ms    Calculated P Axis 52 degrees    Calculated R Axis 18 degrees    Calculated T Axis 52 degrees    Diagnosis       !! AGE AND GENDER SPECIFIC ECG ANALYSIS !!  Sinus rhythm with occasional Premature ventricular complexes  Nonspecific ST abnormality  Abnormal ECG  When compared with ECG of 03-DEC-2019 20:26,  Premature ventricular complexes are now Present  ST now depressed in Anterior leads  Nonspecific T wave abnormality has replaced inverted T waves in Inferior   leads  Confirmed by SHANTELL REARDON (), MARY JO TEJEDA ([id number]) on  12/16/2020 3:53:22 PM                Assessment:     50-year-old man small right cerebellar hemorrhage in the setting of hypertension. Plan:     Resume aspirin 81 mg daily    High intensity statin    Long-term blood pressure goal is less than 130/80 mmHg, if tolerable. PT/OT    MRI in 2019 showed an acute infarction in the right putamen. There were multiple areas of hemosiderin staining with possible amyloid angiopathy. I would like to repeat brain MRI to further investigate this. If he does have cerebral amyloid angiopathy then he will not be a candidate for anticoagulation in the future.     Signed By: Shauna Hallman,      December 18, 2020

## 2020-12-18 NOTE — PROGRESS NOTES
Comprehensive Nutrition Assessment    Type and Reason for Visit: Initial, Positive nutrition screen  Best Practice Alert for Malnutrition Screening Tool: Recently Lost Weight Without Trying: Yes, If Yes, How Much Weight Loss: 14 - 23 lbs, Eating Poorly Due to Decreased Appetite: Yes    Nutrition Recommendations/Plan:    Continue current diet     Malnutrition Assessment:  Malnutrition Status: At risk for malnutrition (specify)(14# (8.2%) wt loss in 1 year, mild muscle wasting)    Nutrition Assessment:   Nutrition History: Wife reports patient having variable intake prior to admission. She states that he still eats 3 meals per day, but smaller volumes. She states he will occasionally have a snack. He states he has never used Boost or Ensure due to being hyperglycemic. Cultural/Taoism/Ethnic Food Preference(s): None   Nutrition Background: Patient with PMH of DM, HTN, GERD, CVA. He presented with AMS. CT of head revealed right cerebellar hemorrhage. Daily Update:  Patient seen with wife at bedside. Patient endorses weight loss over the las 3 years. He states that his usual body weight was 185# three years ago. When asking why he has been losing weight he states \"my stomach. \" When RD inquiring for more information, patient states that he has been having intermittent abdominal pain. He states that he is followed by GI and had an upcoming appointment for EGD but was admitted to the hospital. He states that currently he is eating very well. He states all of dinner last night and breakfast this am. Observed lunch tray with ~90% consumed. When RD recommends Glucerna if pain/poor appetite occur, he declines stating he cannot have artificial sweeteners.     Nutrition Related Findings:   mild wasting to clavicles and temples      Current Nutrition Therapies:  DIET REGULAR    Current Intake:   Average Meal Intake: % Average Supplement Intake: None ordered      Anthropometric Measures:  Height: 5' 9\" (175.3 cm)  Current Body Wt: 71.4 kg (157 lb 6.5 oz)(12/18), Weight source: Bed scale  BMI: 23.2, Normal weight (BMI 18.5-24. 9)     Ideal Body Wt: 160 lbs (73 kg), 98.4 %  Usual Body Wt: 83.9 kg (185 lb)(3 years ago per patient), Percent weight change: -14.9          Estimated Daily Nutrient Needs:  Energy (kcal/day): 3403-8976 (Kcal/kg(25-30), Weight Used: Current(71.4))  Protein (g/day): 71-89 (1-1.25 g/kg) Weight Used: (Current)  Fluid (ml/day):   (1 ml/kcal)    Nutrition Diagnosis:   · Unintended weight loss related to (abdominal pain) as evidenced by (patient reported barrier to PO, diet recall)    Nutrition Interventions:   Food and/or Nutrient Delivery: Continue current diet          Goals:     Active Goal: Continue to meet at least 75% nutrition needs while inpatient    Nutrition Monitoring and Evaluation:      Food/Nutrient Intake Outcomes: Food and nutrient intake       Discharge Planning:    No discharge needs at this time    [address] [place], LD on 12/18/2020 at 1:51 PM  Contact: [phone]        Disaster Mode active

## 2020-12-18 NOTE — PROGRESS NOTES
Physician Progress Note      PATIENT:               Flaco Lewis  CSN #:                  [id number]  :                       1935  ADMIT DATE:       12/15/2020 10:46 PM  [address] [place] [place] DATE:  RESPONDING  PROVIDER #:        John Saldana MD          QUERY TEXT:    Pt admitted with cerebral hemorrhage and hypertensive urgency. Pt noted to have altered mentation and \"not acting like himself\". If possible, please document in the progress notes and discharge summary if you are evaluating and / or treating any of the following: The medical record reflects the following:  Risk Factors: /127, CT lobulated right cerebellar hemorrhage  Clinical Indicators: altered mentation, \"not acting like himself\"  Treatment: tx of HTN with Cardene gtt  Options provided:  -- Hypertensive encephalopathy  -- Metabolic encephalopathy  -- Other - I will add my own diagnosis  -- Disagree - Not applicable / Not valid  -- Disagree - Clinically unable to determine / Unknown  -- Refer to Clinical Documentation Reviewer    PROVIDER RESPONSE TEXT:    This patient has hypertensive encephalopathy. Query created by:  Lorna Arevalo on 2020 12:15 PM      Electronically signed by:  John Saldana MD 2020 1:47 PM

## 2020-12-18 NOTE — ROUTINE PROCESS
Bedside and Verbal shift change report to be given to Haroon Braga RN (oncoming nurse) by self (offgoing nurse). Report included the following information SBAR, Kardex, Intake/Output, MAR and Recent Results.

## 2020-12-18 NOTE — PROGRESS NOTES
TRANSFER - IN REPORT:    Verbal report received from RN on Alice Hightower being received from CCU for routine progression of care      Report consisted of patients Situation, Background, Assessment and Recommendations(SBAR). Information from the following report(s) SBAR, Kardex, Intake/Output, MAR and Recent Results was reviewed with the receiving nurse. Opportunity for questions and clarification was provided. Assessment completed upon patients arrival to unit and care assumed. Skin: warm, dry, and intact. No redness or breakdown on heels, allevyn present on sacrum. Small skin tear present on right elbow. Scattered bruising on bilateral upper extremities.

## 2020-12-19 LAB
MM INDURATION POC: 0 MM (ref 0–5)
PPD POC: NEGATIVE NEGATIVE

## 2020-12-19 PROCEDURE — 74011250637 HC RX REV CODE- 250/637: Performed by: INTERNAL MEDICINE

## 2020-12-19 PROCEDURE — 74011250637 HC RX REV CODE- 250/637: Performed by: FAMILY MEDICINE

## 2020-12-19 PROCEDURE — 65660000000 HC RM CCU STEPDOWN

## 2020-12-19 PROCEDURE — 74011250637 HC RX REV CODE- 250/637: Performed by: PSYCHIATRY & NEUROLOGY

## 2020-12-19 RX ORDER — ATORVASTATIN CALCIUM 40 MG/1
40 TABLET, FILM COATED ORAL
Status: DISCONTINUED | OUTPATIENT
Start: 2020-12-19 | End: 2020-12-21 | Stop reason: HOSPADM

## 2020-12-19 RX ORDER — SUCRALFATE 1 G/1
1 TABLET ORAL
Status: DISCONTINUED | OUTPATIENT
Start: 2020-12-19 | End: 2020-12-19

## 2020-12-19 RX ORDER — SUCRALFATE 1 G/1
1 TABLET ORAL
Status: DISCONTINUED | OUTPATIENT
Start: 2020-12-19 | End: 2020-12-21 | Stop reason: HOSPADM

## 2020-12-19 RX ORDER — VALSARTAN 80 MG/1
160 TABLET ORAL DAILY
Status: DISCONTINUED | OUTPATIENT
Start: 2020-12-20 | End: 2020-12-19

## 2020-12-19 RX ORDER — HYDRALAZINE HYDROCHLORIDE 50 MG/1
100 TABLET, FILM COATED ORAL 3 TIMES DAILY
Status: DISCONTINUED | OUTPATIENT
Start: 2020-12-19 | End: 2020-12-21 | Stop reason: HOSPADM

## 2020-12-19 RX ORDER — VALSARTAN 80 MG/1
80 TABLET ORAL
Status: DISCONTINUED | OUTPATIENT
Start: 2020-12-19 | End: 2020-12-19

## 2020-12-19 RX ADMIN — SUCRALFATE 1 G: 1 TABLET ORAL at 17:28

## 2020-12-19 RX ADMIN — Medication 10 ML: at 05:36

## 2020-12-19 RX ADMIN — VALSARTAN 80 MG: 80 TABLET ORAL at 09:00

## 2020-12-19 RX ADMIN — FLUTICASONE PROPIONATE 2 SPRAY: 50 SPRAY, METERED NASAL at 09:00

## 2020-12-19 RX ADMIN — POTASSIUM CHLORIDE 40 MEQ: 20 TABLET, EXTENDED RELEASE ORAL at 08:59

## 2020-12-19 RX ADMIN — Medication 10 ML: at 14:07

## 2020-12-19 RX ADMIN — ATORVASTATIN CALCIUM 40 MG: 40 TABLET, FILM COATED ORAL at 22:04

## 2020-12-19 RX ADMIN — HYDRALAZINE HYDROCHLORIDE 100 MG: 50 TABLET, FILM COATED ORAL at 17:28

## 2020-12-19 RX ADMIN — FAMOTIDINE 20 MG: 20 TABLET, FILM COATED ORAL at 08:59

## 2020-12-19 RX ADMIN — DILTIAZEM HYDROCHLORIDE 120 MG: 120 CAPSULE, COATED, EXTENDED RELEASE ORAL at 09:00

## 2020-12-19 RX ADMIN — POLYETHYLENE GLYCOL 3350 17 G: 17 POWDER, FOR SOLUTION ORAL at 17:37

## 2020-12-19 RX ADMIN — ASPIRIN 81 MG: 81 TABLET ORAL at 08:59

## 2020-12-19 RX ADMIN — LORATADINE 10 MG: 10 TABLET ORAL at 09:00

## 2020-12-19 RX ADMIN — HYDRALAZINE HYDROCHLORIDE 100 MG: 50 TABLET, FILM COATED ORAL at 22:04

## 2020-12-19 RX ADMIN — SUCRALFATE 1 G: 1 TABLET ORAL at 22:04

## 2020-12-19 RX ADMIN — Medication 10 ML: at 22:12

## 2020-12-19 RX ADMIN — SUCRALFATE 1 G: 1 TABLET ORAL at 12:24

## 2020-12-19 NOTE — PROGRESS NOTES
Hospitalist Note     Admit Date:  12/15/2020 10:46 PM   Name:  Maritza Billingsley   Age:  81 y.o.  :  1935   MRN:  [id number]   PCP:  Stormy Staples MD  Treatment Team: Attending Provider: Fransisco Vela MD; Care Manager: Jonathon Marlow RN; Utilization Review: Mercedes Quintanilla RN; Care Manager: Edinson Bond RN    HPI/Subjective:   Patient with PMH of CVA one year ago, with no residual deficit and hypertension. Came in with altered mentation from home and some r sided weakness. He was found to have severe hypertension in ER. CT showed lobulated R cerebellar hemorrhage. Patient was placed on Cardene drip. Neurosurgery was consulted. Admitted to ICU.     - nauseated today, says he needs his carafate for that. Nothing else works. No CP, SOB, fevers noted. BP still not controlled. No other complaints  Objective:     Patient Vitals for the past 24 hrs:   Temp Pulse Resp BP SpO2   20 1156 98.2 °F (36.8 °C) 64 18 (!) 164/84 95 %   20 0736 98.6 °F (37 °C) 77 18 (!) 154/97 94 %   20 0508 97.8 °F (36.6 °C) 71 20 (!) 152/95 93 %   20 0030 98.7 °F (37.1 °C) 74 18 (!) 153/81 97 %   20 2211 -- 65 -- (!) 165/97 --   208 97.5 °F (36.4 °C) 66 17 (!) 179/86 97 %   20 1600 98.1 °F (36.7 °C) 69 20 (!) 151/82 94 %     Oxygen Therapy  O2 Sat (%): 95 % (20 1156)  Pulse via Oximetry: 60 beats per minute (20 1900)  O2 Device: Room air (20 0810)  O2 Flow Rate (L/min): 2 l/min (20 1300)    Estimated body mass index is 23.6 kg/m² as calculated from the following:    Height as of this encounter: 5' 9\" (1.753 m). Weight as of this encounter: 72.5 kg (159 lb 13.3 oz).     Intake/Output Summary (Last 24 hours) at 2020 1304  Last data filed at 2020 0449  Gross per 24 hour   Intake --   Output 1000 ml   Net -1000 ml       *Note that automatically entered I/Os may not be accurate; dependent on patient compliance with collection and accurate  by [employer]. General:    Well nourished. No overt distress  CV:   RRR. No edema. Lungs:   Even, Unlabored   Skin:     No rashes. No jaundice. Normal coloration  Neuro:  No gross focal deficits. Alert    Data Reviewed:  I have reviewed all labs, meds, and studies from the last 24 hours:  No results found for this or any previous visit (from the past 24 hour(s)). Current Meds:  Current Facility-Administered Medications   Medication Dose Route Frequency    hydrALAZINE (APRESOLINE) tablet 100 mg  100 mg Oral TID    atorvastatin (LIPITOR) tablet 40 mg  40 mg Oral QHS    sucralfate (CARAFATE) tablet 1 g  1 g Oral AC&HS    aspirin delayed-release tablet 81 mg  81 mg Oral DAILY    NUTRITIONAL SUPPORT ELECTROLYTE PRN ORDERS   Does Not Apply PRN    bisacodyL (DULCOLAX) suppository 10 mg  10 mg Rectal DAILY PRN    dilTIAZem ER (CARDIZEM CD) capsule 120 mg  120 mg Oral DAILY    sodium chloride (NS) flush 5-40 mL  5-40 mL IntraVENous Q8H    sodium chloride (NS) flush 5-40 mL  5-40 mL IntraVENous PRN    acetaminophen (TYLENOL) tablet 650 mg  650 mg Oral Q6H PRN    Or    acetaminophen (TYLENOL) suppository 650 mg  650 mg Rectal Q6H PRN    polyethylene glycol (MIRALAX) packet 17 g  17 g Oral DAILY PRN    ondansetron (ZOFRAN) injection 4 mg  4 mg IntraVENous Q6H PRN    loratadine (CLARITIN) tablet 10 mg  10 mg Oral DAILY    fluticasone propionate (FLONASE) 50 mcg/actuation nasal spray 2 Spray  2 Spray Both Nostrils DAILY    albuterol (PROVENTIL HFA, VENTOLIN HFA, PROAIR HFA) inhaler 2 Puff  2 Puff Inhalation Q6H PRN       Other Studies:  No results found for this visit on 12/15/20. No results found.     All Micro Results     None          SARS-CoV-2 Lab Results  \"Novel Coronavirus\" Test: No results found for: COV2NT   \"Emergent Disease\" Test: No results found for: EDPR  \"SARS-COV-2\" Test: No results found for: XGCOVT  Rapid Test:   Lab Results   Component Value Date/Time COVR Not detected 12/16/2020 09:25 AM            Assessment and Plan:     Hospital Problems as of 12/19/2020 Date Reviewed: 12/10/2020          Codes Class Noted - Resolved POA    * (Principal) Cerebellar bleed ([facility] ICD-10-CM: I61.4  ICD-9-CM: 118  12/16/2020 - Present Yes        Hypertension (Chronic) ICD-10-CM: I10  ICD-9-CM: 401.9  Unknown - Present Yes        RESOLVED: Hypertensive emergency ICD-10-CM: I16.1  ICD-9-CM: 401.9  12/16/2020 - 12/18/2020 Yes              Plan:  · Start hydralazine. Stop valsartan. Pt c/o side effect lightheadedness. May still have this issue with other meds if lightheadedness is BP-related per se. · Asa, statin  · Work on placement. Can go when arranged and BP improved    Diet:  DIET REGULAR  DVT ppx:  SCDs    Other listed chronic conditions stable, cont current management.     Signed:  Alissa Awan MD

## 2020-12-19 NOTE — ROUTINE PROCESS
Bedside and verbal shift change report given to [address] [facility], RN (oncoming nurse) by self (offgoing nurse). Report included the following information SBAR, Kardex, Intake/Output, MAR, Recent Results, and Cardiac Rhythm NSR.

## 2020-12-19 NOTE — ROUTINE PROCESS
Verbal bedside report given to [facility], oncoming RN. Patient's situation, background, assessment and recommendations provided. Opportunity for questions provided. Oncoming RN assumed care of patient.

## 2020-12-19 NOTE — ROUTINE PROCESS
Verbal bedside report received from Diony, [facility]. Assumed care of patient.

## 2020-12-19 NOTE — ROUTINE PROCESS
Bedside and Verbal shift change report received from [address] [place] [address] Jana RN (offgoing nurse) to self (oncoming nurse). Report included the following information SBAR, Kardex, Intake/Output, MAR, Recent Results.

## 2020-12-20 LAB
ANION GAP SERPL CALC-SCNC: 7 MMOL/L (ref 7–16)
BUN SERPL-MCNC: 25 MG/DL (ref 8–23)
CALCIUM SERPL-MCNC: 9.2 MG/DL (ref 8.3–10.4)
CHLORIDE SERPL-SCNC: 108 MMOL/L (ref 98–107)
CO2 SERPL-SCNC: 27 MMOL/L (ref 21–32)
CREAT SERPL-MCNC: 1.2 MG/DL (ref 0.8–1.5)
GLUCOSE SERPL-MCNC: 131 MG/DL (ref 65–100)
MM INDURATION POC: 0 MM (ref 0–5)
POTASSIUM SERPL-SCNC: 3.7 MMOL/L (ref 3.5–5.1)
PPD POC: NEGATIVE NEGATIVE
SODIUM SERPL-SCNC: 142 MMOL/L (ref 138–145)

## 2020-12-20 PROCEDURE — 74011250637 HC RX REV CODE- 250/637: Performed by: PSYCHIATRY & NEUROLOGY

## 2020-12-20 PROCEDURE — 36415 COLL VENOUS BLD VENIPUNCTURE: CPT

## 2020-12-20 PROCEDURE — 65660000000 HC RM CCU STEPDOWN

## 2020-12-20 PROCEDURE — 74011250637 HC RX REV CODE- 250/637: Performed by: FAMILY MEDICINE

## 2020-12-20 PROCEDURE — 2709999900 HC NON-CHARGEABLE SUPPLY

## 2020-12-20 PROCEDURE — 80048 BASIC METABOLIC PNL TOTAL CA: CPT

## 2020-12-20 PROCEDURE — 74011250637 HC RX REV CODE- 250/637: Performed by: INTERNAL MEDICINE

## 2020-12-20 RX ORDER — TRAZODONE HYDROCHLORIDE 50 MG/1
50 TABLET ORAL
Status: DISCONTINUED | OUTPATIENT
Start: 2020-12-20 | End: 2020-12-21 | Stop reason: HOSPADM

## 2020-12-20 RX ADMIN — Medication 10 ML: at 14:13

## 2020-12-20 RX ADMIN — HYDRALAZINE HYDROCHLORIDE 100 MG: 50 TABLET, FILM COATED ORAL at 08:29

## 2020-12-20 RX ADMIN — FLUTICASONE PROPIONATE 2 SPRAY: 50 SPRAY, METERED NASAL at 08:29

## 2020-12-20 RX ADMIN — SUCRALFATE 1 G: 1 TABLET ORAL at 08:28

## 2020-12-20 RX ADMIN — SUCRALFATE 1 G: 1 TABLET ORAL at 12:23

## 2020-12-20 RX ADMIN — Medication 10 ML: at 06:22

## 2020-12-20 RX ADMIN — HYDRALAZINE HYDROCHLORIDE 100 MG: 50 TABLET, FILM COATED ORAL at 21:38

## 2020-12-20 RX ADMIN — POLYETHYLENE GLYCOL 3350 17 G: 17 POWDER, FOR SOLUTION ORAL at 08:30

## 2020-12-20 RX ADMIN — ATORVASTATIN CALCIUM 40 MG: 40 TABLET, FILM COATED ORAL at 21:39

## 2020-12-20 RX ADMIN — ACETAMINOPHEN 650 MG: 325 TABLET, FILM COATED ORAL at 05:09

## 2020-12-20 RX ADMIN — SUCRALFATE 1 G: 1 TABLET ORAL at 16:04

## 2020-12-20 RX ADMIN — Medication 10 ML: at 21:44

## 2020-12-20 RX ADMIN — DILTIAZEM HYDROCHLORIDE 120 MG: 120 CAPSULE, COATED, EXTENDED RELEASE ORAL at 08:29

## 2020-12-20 RX ADMIN — HYDRALAZINE HYDROCHLORIDE 100 MG: 50 TABLET, FILM COATED ORAL at 16:04

## 2020-12-20 RX ADMIN — ASPIRIN 81 MG: 81 TABLET ORAL at 08:29

## 2020-12-20 RX ADMIN — SUCRALFATE 1 G: 1 TABLET ORAL at 21:38

## 2020-12-20 NOTE — ROUTINE PROCESS
Verbal bedside report given to [facility], [facility], oncoming RN. Patient's situation, background, assessment and recommendations provided. Opportunity for questions provided. Oncoming RN assumed care of patient.

## 2020-12-20 NOTE — PROGRESS NOTES
Patient's wife notified RN of concern with patient's memory and fluency. She stated she would like to speak to the doctor. Patient's neuro assessment unchanged from AM assessment. Deficits on assessment include L. Sided facial droop and delayed responses. Patient able to answer all questions appropriately. Dr. Xavi Camarillo notified and stated will check on patient.

## 2020-12-20 NOTE — ROUTINE PROCESS
Verbal bedside report given to [facility], oncoming RN. Patient's situation, background, assessment and recommendations provided. Opportunity for questions provided. Oncoming RN assumed care of patient.

## 2020-12-20 NOTE — PROGRESS NOTES
Hospitalist Note     Admit Date:  12/15/2020 10:46 PM   Name:  Kathie Almazan   Age:  81 y.o.  :  1935   MRN:  [id number]   PCP:  Felicia Dave MD  Treatment Team: Attending Provider: Jerald Huffman MD; Care Manager: Arun Huynh, RN; Utilization Review: Yin López, SOWMYA; Care Manager: Nelly Mcclendon, RN; Primary Nurse: Annabelle Leon RN    HPI/Subjective:   Patient with PMH of CVA one year ago, with no residual deficit and hypertension. Came in with altered mentation from home and some r sided weakness. He was found to have severe hypertension in ER. CT showed lobulated R cerebellar hemorrhage. Patient was placed on Cardene drip. Neurosurgery was consulted. Admitted to ICU. Started on statin. BP improved. Neurology approved resuming ASA .     - BP better. Eating. No complaints currently. No HA, fevers    No other complaints  Objective:     Patient Vitals for the past 24 hrs:   Temp Pulse Resp BP SpO2   20 0824 97.3 °F (36.3 °C) 66 16 137/82 97 %   20 0506 97.8 °F (36.6 °C) 72 14 121/67 94 %   20 0106 99.2 °F (37.3 °C) 76 18 (!) 144/73 96 %   20 2202 -- 71 -- (!) 185/83 --   20 2107 98.1 °F (36.7 °C) 63 18 (!) 184/99 96 %   20 1545 97.9 °F (36.6 °C) 71 18 (!) 154/79 93 %   20 1156 98.2 °F (36.8 °C) 64 18 (!) 164/84 95 %     Oxygen Therapy  O2 Sat (%): 97 % (20 0824)  Pulse via Oximetry: 60 beats per minute (20 1900)  O2 Device: Room air (20 0425)  O2 Flow Rate (L/min): 2 l/min (20 1300)    Estimated body mass index is 23.07 kg/m² as calculated from the following:    Height as of this encounter: 5' 9\" (1.753 m). Weight as of this encounter: 70.9 kg (156 lb 3.2 oz).     Intake/Output Summary (Last 24 hours) at 2020 0910  Last data filed at 2020 0015  Gross per 24 hour   Intake --   Output 1050 ml   Net -1050 ml       *Note that automatically entered I/Os may not be accurate; dependent on patient compliance with collection and accurate  by techs. General:    Well nourished. No overt distress  CV:   RRR. No edema. Lungs:   Even, Unlabored   Skin:     No rashes. No jaundice. Normal coloration  Neuro:  No gross focal deficits.   Alert    Data Reviewed:  I have reviewed all labs, meds, and studies from the last 24 hours:  Recent Results (from the past 24 hour(s))   PLEASE READ & DOCUMENT PPD TEST IN 24 HRS    Collection Time: 12/19/20 12:36 PM   Result Value Ref Range    PPD Negative Negative    mm Induration 0 0 - 5 mm   METABOLIC PANEL, BASIC    Collection Time: 12/20/20  4:03 AM   Result Value Ref Range    Sodium 142 138 - 145 mmol/L    Potassium 3.7 3.5 - 5.1 mmol/L    Chloride 108 (H) 98 - 107 mmol/L    CO2 27 21 - 32 mmol/L    Anion gap 7 7 - 16 mmol/L    Glucose 131 (H) 65 - 100 mg/dL    BUN 25 (H) 8 - 23 MG/DL    Creatinine 1.20 0.8 - 1.5 MG/DL    GFR est AA >60 >60 ml/min/1.73m2    GFR est non-AA >60 >60 ml/min/1.73m2    Calcium 9.2 8.3 - 10.4 MG/DL       Current Meds:  Current Facility-Administered Medications   Medication Dose Route Frequency    hydrALAZINE (APRESOLINE) tablet 100 mg  100 mg Oral TID    atorvastatin (LIPITOR) tablet 40 mg  40 mg Oral QHS    sucralfate (CARAFATE) tablet 1 g  1 g Oral AC&HS    aspirin delayed-release tablet 81 mg  81 mg Oral DAILY    NUTRITIONAL SUPPORT ELECTROLYTE PRN ORDERS   Does Not Apply PRN    bisacodyL (DULCOLAX) suppository 10 mg  10 mg Rectal DAILY PRN    dilTIAZem ER (CARDIZEM CD) capsule 120 mg  120 mg Oral DAILY    sodium chloride (NS) flush 5-40 mL  5-40 mL IntraVENous Q8H    sodium chloride (NS) flush 5-40 mL  5-40 mL IntraVENous PRN    acetaminophen (TYLENOL) tablet 650 mg  650 mg Oral Q6H PRN    Or    acetaminophen (TYLENOL) suppository 650 mg  650 mg Rectal Q6H PRN    polyethylene glycol (MIRALAX) packet 17 g  17 g Oral DAILY PRN    ondansetron (ZOFRAN) injection 4 mg  4 mg IntraVENous Q6H PRN    loratadine (CLARITIN) tablet 10 mg  10 mg Oral DAILY    fluticasone propionate (FLONASE) 50 mcg/actuation nasal spray 2 Spray  2 Spray Both Nostrils DAILY    albuterol (PROVENTIL HFA, VENTOLIN HFA, PROAIR HFA) inhaler 2 Puff  2 Puff Inhalation Q6H PRN       Other Studies:  No results found for this visit on 12/15/20. No results found. All Micro Results     None          SARS-CoV-2 Lab Results  \"Novel Coronavirus\" Test: No results found for: COV2NT   \"Emergent Disease\" Test: No results found for: EDPR  \"SARS-COV-2\" Test: No results found for: XGCOVT  Rapid Test:   Lab Results   Component Value Date/Time    COVR Not detected 12/16/2020 09:25 AM            Assessment and Plan:     Hospital Problems as of 12/20/2020 Date Reviewed: 12/10/2020          Codes Class Noted - Resolved POA    * (Principal) Cerebellar bleed ([facility] [address] ICD-10-CM: I61.4  ICD-9-CM: 602  12/16/2020 - Present Yes        Hypertension (Chronic) ICD-10-CM: I10  ICD-9-CM: 401.9  Unknown - Present Yes        RESOLVED: Hypertensive emergency ICD-10-CM: I16.1  ICD-9-CM: 401.9  12/16/2020 - 12/18/2020 Yes              Plan:  · Cont hydralazine. · Asa, statin  · Work on placement. Can go when arranged     Diet:  DIET DIABETIC CONSISTENT CARB  DVT ppx:  SCDs    Other listed chronic conditions stable, cont current management.     Signed:  Dona Wynne MD

## 2020-12-20 NOTE — ROUTINE PROCESS
Verbal bedside report received from Dion[place], [address] [facility]. Assumed care of patient.

## 2020-12-20 NOTE — ROUTINE PROCESS
Verbal bedside report received from [address] [place] [address], [address] [facility]. Assumed care of patient. NIH completed at bedside.

## 2020-12-21 LAB
COVID-19 RAPID TEST, COVR: NOT DETECTED
SOURCE, COVRS: NORMAL

## 2020-12-21 PROCEDURE — 74011250637 HC RX REV CODE- 250/637: Performed by: FAMILY MEDICINE

## 2020-12-21 PROCEDURE — 87635 SARS-COV-2 COVID-19 AMP PRB: CPT

## 2020-12-21 PROCEDURE — 74011250637 HC RX REV CODE- 250/637: Performed by: INTERNAL MEDICINE

## 2020-12-21 PROCEDURE — 74011250637 HC RX REV CODE- 250/637: Performed by: PSYCHIATRY & NEUROLOGY

## 2020-12-21 RX ORDER — HYDRALAZINE HYDROCHLORIDE 100 MG/1
100 TABLET, FILM COATED ORAL 3 TIMES DAILY
Qty: 90 TAB | Refills: 1 | Status: SHIPPED | OUTPATIENT
Start: 2020-12-21 | End: 2021-03-22 | Stop reason: SDUPTHER

## 2020-12-21 RX ORDER — ATORVASTATIN CALCIUM 40 MG/1
40 TABLET, FILM COATED ORAL
Qty: 30 TAB | Refills: 1 | Status: SHIPPED | OUTPATIENT
Start: 2020-12-21 | End: 2021-03-22 | Stop reason: SDUPTHER

## 2020-12-21 RX ADMIN — Medication 10 ML: at 05:54

## 2020-12-21 RX ADMIN — ASPIRIN 81 MG: 81 TABLET ORAL at 08:56

## 2020-12-21 RX ADMIN — FLUTICASONE PROPIONATE 2 SPRAY: 50 SPRAY, METERED NASAL at 09:03

## 2020-12-21 RX ADMIN — TRAZODONE HYDROCHLORIDE 50 MG: 50 TABLET ORAL at 00:43

## 2020-12-21 RX ADMIN — HYDRALAZINE HYDROCHLORIDE 100 MG: 50 TABLET, FILM COATED ORAL at 08:56

## 2020-12-21 RX ADMIN — DILTIAZEM HYDROCHLORIDE 120 MG: 120 CAPSULE, COATED, EXTENDED RELEASE ORAL at 08:56

## 2020-12-21 RX ADMIN — SUCRALFATE 1 G: 1 TABLET ORAL at 10:35

## 2020-12-21 RX ADMIN — SUCRALFATE 1 G: 1 TABLET ORAL at 08:56

## 2020-12-21 NOTE — DISCHARGE SUMMARY
Hospitalist Discharge Summary     Admit Date:  12/15/2020 10:46 PM   DC note date: 2020  Name:  Hannah Thomas   Age:  81 y.o.  :  1935   MRN:  [id number]   PCP:  Rainer Helm MD  Treatment Team: Attending Provider: Klever Pereyra MD; Utilization Review: Lori Antunez RN; Care Manager: Bri Knight RN; Primary Nurse: Gavin Rainey; Physical Therapist: Grace Rojas, PT, DPT; Occupational Therapist: Georgina Fregoso    Problem List for this Hospitalization:  Hospital Problems as of 2020 Date Reviewed: 12/10/2020          Codes Class Noted - Resolved POA    * (Principal) Cerebellar bleed ([address] ICD-10-CM: I61.4  ICD-9-CM: 987  2020 - Present Yes        Hypertension (Chronic) ICD-10-CM: I10  ICD-9-CM: 401.9  Unknown - Present Yes        RESOLVED: Hypertensive emergency ICD-10-CM: I16.1  ICD-9-CM: 401.9  2020 - 2020 Yes                Admission HPI from 12/15/2020:    \"  Patient is an 81yoM with PMH significant for prior CVA ~1 year ago with no residual deficits and HTN who presents with altered mentation from home. Patient reported feeling lightheaded when in the shower last night and sat down. He felt uncoordinated with both arms/hands. He is not acting like himself either. Upon presentation to the ER, CODE S was called given his constellation of symptoms. His BP was severely elevated with BP of 244/127. CT head showed evidence of a lobulated R cerebellar hemorrhage. Patient was placed on cardene drip for BP control. Neurosurgery was consulted and recommended admission with tight BP control with SBP<140. Hospitalist consulted for admission. \"    Hospital Course:  Patient with PMH of CVA one year ago, with no residual deficit and hypertension. Came in with altered mentation from home and some r sided weakness. He was found to have severe hypertension in ER.   CT showed lobulated R cerebellar hemorrhage. Patient was placed on Cardene drip. Neurosurgery was consulted. Admitted to ICU. Started on statin. BP improved. Neurology approved resuming ASA 12/18. Pt remained stable. Neuro deficits mostly ataxia in hands and LLE. Needs rehab and can transfer today. Hydralazine started for better HTN control. Reported intolerance to most other agents. Much better controlled now. Disposition: Skilled Nursing Facility  Activity: PT/OT Eval and Treat  Diet: DIET DIABETIC CONSISTENT CARB Regular  Code Status: Full Code    Follow Up Orders: Follow-up Appointments   Procedures    FOLLOW UP VISIT Appointment in: Other [facility]) Neurology: Follow up in 3-4 weeks after discharge in the outpatient clinic with Dr. Mary Portillo or NP. Neurology: Follow up in 3-4 weeks after discharge in the outpatient clinic with Dr. Mary Portillo or NP. Standing Status:   Standing     Number of Occurrences:   1     Order Specific Question:   Appointment in     Answer: Other (Specify)       Follow-up Information     Follow up With Specialties Details Why Raul Ruvalcaba MD Internal Medicine Call As needed [address]  [phone]            Discharge meds at bottom of this note. Plan was discussed with pt. All questions answered. Patient was stable at time of discharge. Given instructions to call a physician or return if any concerns. Discharge summary and encounter summary was sent to PCP electronically via \"Comm Mgt\" link in [facility], if possible. Diagnostic Imaging/Tests:   Xr Chest Sngl V    Result Date: 12/17/2020  CHEST X-RAY, one view. HISTORY:  Abnormal chest x-ray, pneumonia or fluid overload. TECHNIQUE:  AP portable semiupright view COMPARISON: Yesterday's exam FINDINGS: Lungs: Slight increase in interstitial prominence. Costophrenic angles: Are fairly sharp. Heart size: is normal. Pulmonary vasculature: is unremarkable. Aorta: Unremarkable.  Included portion of the upper abdomen: is unremarkable. Bones: No gross bony lesions. Other: None. IMPRESSION:  Slight increase in interstitial prominence, nonspecific, atypical pneumonia or possibly edema. Xr Chest Sngl V    Result Date: 12/16/2020   Portable view of the chest COMPARISON: May 2015 CLINICAL HISTORY: FINDINGS: Heart appears mildly enlarged. Mediastinal contour is within normal limits. Bilateral interstitial densities, nonspecific. There is no pneumothorax or large pleural effusion. Surrounding bones are unremarkable. IMPRESSION: 1. Bilateral interstitial prominence, likely due to vascular congestion. Atypical infection is possible. 2. No lobar consolidation. Ct Head Wo Cont    Result Date: 12/17/2020  Noncontrast CT of the brain. COMPARISON: December 16, 2020 earlier today INDICATION: Follow-up right cerebellar hemorrhage. TECHNIQUE: Contiguous axial images were obtained from the skull base through the vertex without IV contrast. Radiation dose reduction techniques were used for this study:  Our CT scanners use one or all of the following: Automated exposure control, adjustment of the mA and/or kVp according to patient's size, iterative reconstruction. FINDINGS: There is lobulated hemorrhage in the right cerebellum, measuring up to 2.5 cm at the maximal dimension, and slightly extending into the fourth ventricle. Findings similar to prior exam. There is no hydrocephalus, or midline shift. Periventricular hypodensities, nonspecific and likely due to chronic small vessel changes. Included globes appear intact. Frontal sinuses are hypoplastic. Mastoid air cells are aerated. Surrounding bones are stable. IMPRESSION: 1. Stable small right cerebellar hemorrhage. No significant change compared to prior exam.    Ct Head Wo Cont    Result Date: 12/16/2020  History: repeat CT head 6 hrs after previous CT head. Right cerebellar hemorrhage Exam: CT head without contrast Technique:  Thin section axial CT images were obtained from the skullbase through the vertex. Radiation dose reduction techniques were used for this study. Our CT scanners use one or all of the following: Automated exposure control, adjustment of the mA and/or kV according to patient size, use of iterative reconstruction. COMPARISON: 12/15/2020 Findings: Persistent right cerebellar intraparenchymal hemorrhage noted, unchanged when compared with the prior exam. There is ectasia of the basilar artery, as was seen previously. Chronic appearing white matter hypodensity noted in the corona radiata and centrum semiovale. No significant mass effect or midline shift. There is mucosal thickening of the maxillary sinuses. The exam is somewhat limited due to patient motion. Impression: Stable exam with persistent right cerebellar hemorrhage. Ct Perf W Cbf    Result Date: 12/16/2020  CT Perfusion Imaging INDICATION:  Acute neuro changes. Dizziness, weakness. History of hypertension and previous stroke CT perfusion imaging of the brain was performed after the administration of intravenous contrast.  Perfusion maps and perfusion analysis output were generated using the Estify perfusion processing software algorithm. Radiation dose reduction techniques were used for this study: All CT scans performed at this facility use one or all of the following: Automated exposure control, adjustment of the mA and/or kVp according to patient's size, iterative reconstruction. FINDINGS: RAPID Output Values: CBF < 30% volume:  0 ml   (core infarction volume greater than 50 cc associated with poor outcomes) Tmax > 6 seconds: 0 ml Tmax/CBF Mismatch Volume: 0 ml Tmax/CBF Mismatch Ratio: N/A Hypoperfusion Intensity Ratio: 0   (values greater than 0.5 associated with poor outcome) Tmax > 10 seconds Volume: 0 ml   (volume greater than 100 mL is associated with poor outcome)     IMPRESSION: No evidence of core infarct or ischemic penumbra.  Please note that the determination of patient treatment is not based solely upon imaging factors or calculation values. Management of ischemia is at the discretion of the primary physician and is based upon a combination of clinical and imaging data, along other factors. Cta Code Neuro Head And Neck W Cont    Result Date: 12/16/2020  History: Left hand tingling Comments: CT ANGIOGRAM OF THE NECK AND CT ANGIOGRAM OF THE [place] OF MIN was obtained following the administration of IV contrast. IV contrast was administered to evaluate the arterial vasculature. Reformatted images in the coronal and sagittal planes as well as 3-D imaging was obtained and reviewed on a dedicated PACS and [address]. Radiation reduction dose techniques were used for the study. Our CT scanner use one or all of the following- Automated exposure control, adjustment of the mA and/or KV according the patient size, iterative reconstruction. All measurements are based upon NASCET criteria if appropriate. This study was analyzed by the [address].Algorithm Findings: CT ANGIOGRAM OF THE NECK: The arch and proximal great vessels are patent. The carotid bulbs demonstrate mild eccentric calcified plaque disease at the carotid bulbs. No significant stenosis. The proximal vertebral arteries are patent however a moderate stenosis is noted at the origin on the left. Mild chronic changes are noted in the lung apices Thyroid gland is grossly unremarkable. CT ANGIOGRAM OF [place] OF MIN: The petrous, cavernous, and supraclinoid internal carotid arteries are patent. Mild atherosclerotic changes are noted. The distal vertebral arteries, basilar artery and posterior cerebral arteries are patent. The posterior cerebral arteries demonstrate luminal regularity with stenoses noted in both P2 segments as well as a P3 and P4 segments. The dural venous sinuses are patent Small right cerebellar hemorrhage     IMPRESSION: 1.  No evidence of large vessel occlusive disease or high-grade stenosis however bilateral posterior cerebral artery disease is noted. 2. Small right cerebellar hemorrhage     Ct Code Neuro Head Wo Contrast    Result Date: 12/15/2020  Clinical history: Dizziness, weakness. History of hypertension and previous stroke TECHNIQUE: Axial CT of the brain without IV contrast. CT dose reduction was achieved through use of a standardized protocol tailored for this examination and automatic exposure control for dose modulation. COMPARISON: September 17, 2020. FINDINGS: There is a 1.5 x 2.5 cm lobulated hyperdensity in the right cerebellum, extending into the fourth ventricle (series 3, image 12). Findings consistent with acute hemorrhage. There is no midline shift or hydrocephalus or extra-axial fluid collection. There is no CT evidence of supratentorial acute territorial infarction. Periventricular hypodensities, nonspecific and likely due to chronic small vessel changes. Included globes appear intact. Frontal sinuses are hypoplastic. The remainder of the paranasal sinuses and the mastoid air cells are aerated. There is no skull fracture. IMPRESSION: A 1.5 x 2.5 cm lobulated right cerebellar hemorrhage. Close follow-up recommended. DC5 Report telephoned to ED physician, Dr. Brian Tellez, 11:48 PM on exam date. Echocardiogram/EKG results:  No results found for this visit on 12/15/20. Results for orders placed or performed during the hospital encounter of 12/15/20   EKG, 12 LEAD, INITIAL   Result Value Ref Range    Ventricular Rate 83 BPM    Atrial Rate 83 BPM    P-R Interval 178 ms    QRS Duration 104 ms    Q-T Interval 388 ms    QTC Calculation (Bezet) 455 ms    Calculated P Axis 52 degrees    Calculated R Axis 18 degrees    Calculated T Axis 52 degrees    Diagnosis       !! AGE AND GENDER SPECIFIC ECG ANALYSIS !!   Sinus rhythm with occasional Premature ventricular complexes  Nonspecific ST abnormality  Abnormal ECG  When compared with ECG of 03-DEC-2019 20:26,  Premature ventricular complexes are now Present  ST now depressed in Anterior leads  Nonspecific T wave abnormality has replaced inverted T waves in Inferior   leads  Confirmed by SHANTELL REARDON (), Vianney Zheng ([id number]) on 12/16/2020 3:53:22 PM         Procedures done this admission:  * No surgery found *    All Micro Results     None          SARS-CoV-2 Lab Results  \"Novel Coronavirus\" Test: No results found for: COV2NT   \"Emergent Disease\" Test: No results found for: EDPR  \"SARS-COV-2\" Test: No results found for: XGCOVT  Rapid Test:   Lab Results   Component Value Date/Time    COVR Not detected 12/16/2020 09:25 AM            Labs: Results:       BMP, Mg, Phos Recent Labs     12/20/20  0403      K 3.7   *   CO2 27   AGAP 7   BUN 25*   CREA 1.20   CA 9.2   *      CBC No results for input(s): WBC, RBC, HGB, HCT, PLT, GRANS, LYMPH, EOS, MONOS, BASOS, IG, ANEU, ABL, BEL, ABM, ABB, AIG, HGBEXT, HCTEXT, PLTEXT in the last 72 hours. LFT No results for input(s): ALT, TBIL, AP, TP, ALB, GLOB, AGRAT in the last 72 hours.     No lab exists for component: SGOT, GPT   Cardiac Testing Lab Results   Component Value Date/Time     05/28/2015 01:49 PM     05/25/2015 05:16 PM    B-type Natriuretic Peptide 68.0 06/10/2015 12:44 PM    Troponin-I, Qt. <0.04 05/25/2015 05:16 PM    Troponin-I, Qt. <0.04 03/14/2015 11:30 AM      Coagulation Tests Lab Results   Component Value Date/Time    Prothrombin time 13.7 12/16/2020 03:11 AM    Prothrombin time 13.1 12/03/2019 08:01 PM    INR 1.0 12/16/2020 03:11 AM    INR 1.0 12/03/2019 08:01 PM    INR (POC) 1.1 12/16/2020 12:46 AM    INR (POC) 1.5 (H) 12/03/2019 07:54 PM    aPTT 28.0 12/03/2019 08:01 PM      A1c Lab Results   Component Value Date/Time    Hemoglobin A1c 5.7 (H) 09/17/2020 10:43 AM    Hemoglobin A1c 5.6 12/04/2019 03:41 AM      Lipid Panel Lab Results   Component Value Date/Time    Cholesterol, total 166 09/17/2020 10:43 AM    HDL Cholesterol 70 09/17/2020 10:43 AM    LDL, calculated 37 05/22/2020 09:16 AM    LDL Chol Calc (NIH) 82 09/17/2020 10:43 AM    VLDL, calculated 8 05/22/2020 09:16 AM    VLDL Cholesterol Yefri 14 09/17/2020 10:43 AM    Triglyceride 76 09/17/2020 10:43 AM    CHOL/HDL Ratio 2.4 12/04/2019 03:41 AM      Thyroid Panel Lab Results   Component Value Date/Time    TSH 1.770 09/17/2020 10:43 AM    TSH 2.470 05/22/2020 09:16 AM        Most Recent UA Lab Results   Component Value Date/Time    Color YELLOW 12/15/2020 11:01 PM    Appearance TURBID 12/15/2020 11:01 PM    Specific gravity 1.014 12/15/2020 11:01 PM    pH (UA) 7.5 12/15/2020 11:01 PM    Protein 30 (A) 12/15/2020 11:01 PM    Glucose 100 12/15/2020 11:01 PM    Ketone Negative 12/15/2020 11:01 PM    Bilirubin Negative 12/15/2020 11:01 PM    Blood SMALL (A) 12/15/2020 11:01 PM    Urobilinogen 0.2 12/15/2020 11:01 PM    Nitrites Negative 12/15/2020 11:01 PM    Leukocyte Esterase Negative 12/15/2020 11:01 PM    WBC 0 12/15/2020 11:01 PM    RBC 3-5 12/15/2020 11:01 PM    Epithelial cells 0-3 12/15/2020 11:01 PM    Bacteria 0 12/15/2020 11:01 PM    Casts 0 12/15/2020 11:01 PM    Mucus Present 01/09/2017 09:43 AM        Allergies   Allergen Reactions    Amiodarone Rash    Benazepril Unknown (comments)    Hydrochlorothiazide Other (comments)     Hand redness and swelling.     Lisinopril Cough    Valsartan Other (comments)     lightheaded     Immunization History   Administered Date(s) Administered    Influenza Vaccine 09/05/2010    Pneumococcal Polysaccharide (PPSV-23) 02/26/2020    TB Skin Test (PPD) Intradermal 12/04/2019, 12/18/2020    Tdap 05/07/2015       All Labs from Last 24 Hrs:  Recent Results (from the past 24 hour(s))   PLEASE READ & DOCUMENT PPD TEST IN 48 HRS    Collection Time: 12/20/20 12:27 PM   Result Value Ref Range    PPD Negative Negative    mm Induration 0 0 - 5 mm       Discharge Exam:  Patient Vitals for the past 24 hrs:   Temp Pulse Resp BP SpO2   12/21/20 0856 98 °F (36.7 °C) 78 18 118/61 90 %   12/21/20 0547 97.8 °F (36.6 °C) 78 18 119/66 92 %   12/21/20 0106 97.9 °F (36.6 °C) 67 18 (!) 146/78 94 %   12/20/20 2114 97.8 °F (36.6 °C) 78 17 (!) 151/72 95 %   12/20/20 1657 98.2 °F (36.8 °C) 82 18 129/75 94 %   12/20/20 1604 -- 83 -- 123/78 --   12/20/20 1343 97.4 °F (36.3 °C) 77 18 109/65 95 %     Oxygen Therapy  O2 Sat (%): 90 % (12/21/20 0856)  Pulse via Oximetry: 60 beats per minute (12/17/20 1900)  O2 Device: Room air (12/21/20 0435)  O2 Flow Rate (L/min): 2 l/min (12/17/20 1300)    Estimated body mass index is 22.4 kg/m² as calculated from the following:    Height as of this encounter: 5' 9\" (1.753 m). Weight as of this encounter: 68.8 kg (151 lb 10.8 oz). Intake/Output Summary (Last 24 hours) at 12/21/2020 1027  Last data filed at 12/20/2020 1621  Gross per 24 hour   Intake 120 ml   Output 400 ml   Net -280 ml       *Note that automatically entered I/Os may not be accurate; dependent on patient compliance with collection and accurate  by assistants. General:    Well nourished. No overt distress  Eyes:   Normal sclerae. Extraocular movements intact. ENT:  Normocephalic, atraumatic. Moist mucous membranes  CV:   Regular rate and rhythm. No m/r/g. No edema. Lungs:  CTAB. No wheezing, rhonchi, or rales. Unlabored  Abdomen: Soft, nontender, nondistended. Extremities: Warm and dry. No cyanosis or clubbing  Neurologic: CN II-XII grossly intact. No gross focal deficits. Alert. Skin:     No rashes. No jaundice. Psych:  Normal mood and affect.     Current Med List in Hospital:   Current Facility-Administered Medications   Medication Dose Route Frequency    traZODone (DESYREL) tablet 50 mg  50 mg Oral QHS PRN    hydrALAZINE (APRESOLINE) tablet 100 mg  100 mg Oral TID    atorvastatin (LIPITOR) tablet 40 mg  40 mg Oral QHS    sucralfate (CARAFATE) tablet 1 g  1 g Oral AC&HS    aspirin delayed-release tablet 81 mg  81 mg Oral DAILY    NUTRITIONAL SUPPORT ELECTROLYTE PRN ORDERS   Does Not Apply PRN    bisacodyL (DULCOLAX) suppository 10 mg  10 mg Rectal DAILY PRN    dilTIAZem ER (CARDIZEM CD) capsule 120 mg  120 mg Oral DAILY    sodium chloride (NS) flush 5-40 mL  5-40 mL IntraVENous Q8H    sodium chloride (NS) flush 5-40 mL  5-40 mL IntraVENous PRN    acetaminophen (TYLENOL) tablet 650 mg  650 mg Oral Q6H PRN    Or    acetaminophen (TYLENOL) suppository 650 mg  650 mg Rectal Q6H PRN    polyethylene glycol (MIRALAX) packet 17 g  17 g Oral DAILY PRN    ondansetron (ZOFRAN) injection 4 mg  4 mg IntraVENous Q6H PRN    loratadine (CLARITIN) tablet 10 mg  10 mg Oral DAILY    fluticasone propionate (FLONASE) 50 mcg/actuation nasal spray 2 Spray  2 Spray Both Nostrils DAILY    albuterol (PROVENTIL HFA, VENTOLIN HFA, PROAIR HFA) inhaler 2 Puff  2 Puff Inhalation Q6H PRN       Discharge Info:   Current Discharge Medication List      START taking these medications    Details   atorvastatin (LIPITOR) 40 mg tablet Take 1 Tab by mouth nightly. Indications: stroke prevention  Qty: 30 Tab, Refills: 1      hydrALAZINE (APRESOLINE) 100 mg tablet Take 1 Tab by mouth three (3) times daily. Indications: high blood pressure  Qty: 90 Tab, Refills: 1         CONTINUE these medications which have NOT CHANGED    Details   sucralfate (CARAFATE) 100 mg/mL suspension Take 5 mL by mouth four (4) times daily. Qty: 414 mL, Refills: 0    Associated Diagnoses: Gastroesophageal reflux disease with esophagitis without hemorrhage; Epigastric pain      fluticasone propionate (FLONASE) 50 mcg/actuation nasal spray SPRAY 2 SPRAYS INTO EACH NOSTRIL EVERY DAY  Qty: 1 Bottle, Refills: 5      dilTIAZem ER (CARDIZEM CD) 120 mg capsule TAKE 1 CAPSULE BY MOUTH EVERY DAY  Qty: 30 Cap, Refills: 5    Associated Diagnoses: Essential hypertension      COQ10, UBIQUINOL, PO Take  by mouth. ALPHA LIPOIC ACID PO Take  by mouth. acetylcarnitine HCl (ACETYL L-CARNITINE PO) Take  by mouth.       aspirin 81 mg chewable tablet Take 1 Tab by mouth daily. Qty: 30 Tab, Refills: 0      docusate sodium (STOOL SOFTENER) 100 mg tab Take  by mouth. betamethasone dipropionate (DIPROLENE) 0.05 % ointment Apply  to affected area two (2) times a day. cholecalciferol, vitamin D3, (VITAMIN D3) 2,000 unit tab Take  by mouth. TURMERIC PO Take  by mouth. folic acid-vit Y4-BCZ T97 2.2-25-1 mg tab Take  by mouth. nystatin-triamcinolone (MYCOLOG II) topical cream Apply  to affected area two (2) times daily as needed for Skin Irritation. Qty: 30 g, Refills: 1      triamcinolone acetonide (KENALOG) 0.025 % topical cream Apply  to affected area two (2) times a day. use thin layer      fexofenadine (ALLEGRA) 180 mg tablet Take 180 mg by mouth daily as needed for Allergies. Time spent in patient discharge planning and coordination 35 minutes.     Signed:  Dona Wynne MD

## 2020-12-21 NOTE — ROUTINE PROCESS
Verbal bedside report given to [facility], oncoming RN. Patient's situation, background, assessment and recommendations provided. Opportunity for questions provided. Oncoming RN assumed care of patient.

## 2020-12-21 NOTE — ROUTINE PROCESS
Bedside and Verbal shift change report given to self (oncoming nurse) by Kateryna Rosario RN  Report included the following information SBAR, Kardex, Intake/Output and MAR.

## 2020-12-21 NOTE — PROGRESS NOTES
Spiritual Care Visit, initial visit. Visited with patient at bedside. Prayed for patient's healing and health. Visit by Anayeli Cherry, Staff .  Patricia, Caryl.B., B.A.

## 2020-12-21 NOTE — ROUTINE PROCESS
Verbal bedside report received from [facility], [facility]. Assumed care of patient.

## 2020-12-21 NOTE — PROGRESS NOTES
Care Management Interventions  PCP Verified by CM: Yes(Dr Loco Jurado)  Last Visit to PCP: 12/10/20  Mode of Transport at Discharge: Other (see comment)(Holly Corrales Spouse  )  Transition of Care Consult (CM Consult): Discharge Planning  Discharge Durable Medical Equipment: No  Physical Therapy Consult: Yes  Occupational Therapy Consult: Yes  Current Support Network: Lives with Spouse, Own Home  Confirm Follow Up Transport: Family  The Plan for Transition of Care is Related to the Following Treatment Goals : STR vs IRC  The Patient and/or Patient Representative was Provided with a Choice of Provider and Agrees with the Discharge Plan?: Yes  Name of the Patient Representative Who was Provided with a Choice of Provider and Agrees with the Discharge Plan: Spouse, [address] [place] [address] of Choice List was Provided with Basic Dialogue that Supports the Patient's Individualized Plan of Care/Goals, Treatment Preferences and Shares the Quality Data Associated with the Providers?: Yes  [place] Resource Information Provided?: No  Discharge Location  Discharge Placement: Unable to determine at this time    CM received a bed at [facility]. CM left message with Inga, with [facility], pt's first choice for availablility. Pt discharge today    [address], [address] has a bed available and both pt and spouse accept bed offer. Pt's wife requested pt to be placed back on Aricept. Dr Patricoi Garcia informed. Spouse also requesting contact number for updates-number given to spouse. 1200 Numbers obtained. [address] ambulance scheduled for transport at 1400. Number for contact given to spouse. Dr Patricio Garcia informed [facility] has pt taking Donepezil 5 mg qHS. No other CM needs noted.

## 2021-01-06 VITALS
HEART RATE: 76 BPM | TEMPERATURE: 98 F | DIASTOLIC BLOOD PRESSURE: 61 MMHG | RESPIRATION RATE: 18 BRPM | WEIGHT: 151.68 LBS | BODY MASS INDEX: 22.47 KG/M2 | HEIGHT: 69 IN | OXYGEN SATURATION: 95 % | SYSTOLIC BLOOD PRESSURE: 131 MMHG

## 2021-01-20 ENCOUNTER — HOSPITAL ENCOUNTER (OUTPATIENT)
Dept: MRI IMAGING | Age: 86
Discharge: HOME | End: 2021-01-20
Attending: PSYCHIATRY & NEUROLOGY
Payer: MEDICARE

## 2021-01-20 DIAGNOSIS — I61.4 CEREBELLAR BLEED (HCC): ICD-10-CM

## 2021-01-20 PROCEDURE — 70551 MRI BRAIN STEM W/O DYE: CPT

## 2021-03-08 ENCOUNTER — APPOINTMENT (OUTPATIENT)
Dept: CT IMAGING | Age: 86
End: 2021-03-08
Attending: EMERGENCY MEDICINE
Payer: MEDICARE

## 2021-03-08 ENCOUNTER — HOSPITAL ENCOUNTER (EMERGENCY)
Age: 86
Discharge: NURSING, REHAB OR LONG-TERM CARE | End: 2021-03-08
Attending: EMERGENCY MEDICINE
Payer: MEDICARE

## 2021-03-08 VITALS
RESPIRATION RATE: 18 BRPM | HEART RATE: 45 BPM | SYSTOLIC BLOOD PRESSURE: 161 MMHG | DIASTOLIC BLOOD PRESSURE: 77 MMHG | OXYGEN SATURATION: 98 % | TEMPERATURE: 98 F

## 2021-03-08 DIAGNOSIS — R42 VERTIGO: ICD-10-CM

## 2021-03-08 DIAGNOSIS — R42 DIZZINESS: Primary | ICD-10-CM

## 2021-03-08 LAB
ALBUMIN SERPL-MCNC: 3.4 G/DL (ref 3.2–4.6)
ALBUMIN/GLOB SERPL: 1 {RATIO} (ref 1.2–3.5)
ALP SERPL-CCNC: 71 U/L (ref 50–136)
ALT SERPL-CCNC: 14 U/L (ref 12–65)
ANION GAP SERPL CALC-SCNC: 4 MMOL/L (ref 7–16)
AST SERPL-CCNC: 10 U/L (ref 15–37)
ATRIAL RATE: 59 BPM
BASOPHILS # BLD: 0 K/UL (ref 0–0.2)
BASOPHILS NFR BLD: 0 % (ref 0–2)
BILIRUB SERPL-MCNC: 0.4 MG/DL (ref 0.2–1.1)
BUN SERPL-MCNC: 22 MG/DL (ref 8–23)
CALCIUM SERPL-MCNC: 9.3 MG/DL (ref 8.3–10.4)
CALCULATED R AXIS, ECG10: 33 DEGREES
CALCULATED T AXIS, ECG11: 2 DEGREES
CHLORIDE SERPL-SCNC: 104 MMOL/L (ref 98–107)
CO2 SERPL-SCNC: 33 MMOL/L (ref 21–32)
CREAT SERPL-MCNC: 1.1 MG/DL (ref 0.8–1.5)
DIAGNOSIS, 93000: NORMAL
DIFFERENTIAL METHOD BLD: ABNORMAL
EOSINOPHIL # BLD: 0.1 K/UL (ref 0–0.8)
EOSINOPHIL NFR BLD: 1 % (ref 0.5–7.8)
ERYTHROCYTE [DISTWIDTH] IN BLOOD BY AUTOMATED COUNT: 13.4 % (ref 11.9–14.6)
GLOBULIN SER CALC-MCNC: 3.4 G/DL (ref 2.3–3.5)
GLUCOSE SERPL-MCNC: 117 MG/DL (ref 65–100)
HCT VFR BLD AUTO: 40.2 % (ref 41.1–50.3)
HGB BLD-MCNC: 13.2 G/DL (ref 13.6–17.2)
IMM GRANULOCYTES # BLD AUTO: 0 K/UL (ref 0–0.5)
IMM GRANULOCYTES NFR BLD AUTO: 0 % (ref 0–5)
LYMPHOCYTES # BLD: 1.2 K/UL (ref 0.5–4.6)
LYMPHOCYTES NFR BLD: 13 % (ref 13–44)
MCH RBC QN AUTO: 30.6 PG (ref 26.1–32.9)
MCHC RBC AUTO-ENTMCNC: 32.8 G/DL (ref 31.4–35)
MCV RBC AUTO: 93.3 FL (ref 79.6–97.8)
MONOCYTES # BLD: 1 K/UL (ref 0.1–1.3)
MONOCYTES NFR BLD: 11 % (ref 4–12)
NEUTS SEG # BLD: 6.9 K/UL (ref 1.7–8.2)
NEUTS SEG NFR BLD: 75 % (ref 43–78)
NRBC # BLD: 0 K/UL (ref 0–0.2)
PLATELET # BLD AUTO: 197 K/UL (ref 150–450)
PMV BLD AUTO: 9.7 FL (ref 9.4–12.3)
POTASSIUM SERPL-SCNC: 3.7 MMOL/L (ref 3.5–5.1)
PROT SERPL-MCNC: 6.8 G/DL (ref 6.3–8.2)
Q-T INTERVAL, ECG07: 438 MS
QRS DURATION, ECG06: 96 MS
QTC CALCULATION (BEZET), ECG08: 407 MS
RBC # BLD AUTO: 4.31 M/UL (ref 4.23–5.6)
SODIUM SERPL-SCNC: 141 MMOL/L (ref 136–145)
VENTRICULAR RATE, ECG03: 52 BPM
WBC # BLD AUTO: 9.2 K/UL (ref 4.3–11.1)

## 2021-03-08 PROCEDURE — 80053 COMPREHEN METABOLIC PANEL: CPT

## 2021-03-08 PROCEDURE — 70450 CT HEAD/BRAIN W/O DYE: CPT

## 2021-03-08 PROCEDURE — 74011250637 HC RX REV CODE- 250/637: Performed by: EMERGENCY MEDICINE

## 2021-03-08 PROCEDURE — 99285 EMERGENCY DEPT VISIT HI MDM: CPT

## 2021-03-08 PROCEDURE — 85025 COMPLETE CBC W/AUTO DIFF WBC: CPT

## 2021-03-08 PROCEDURE — 93005 ELECTROCARDIOGRAM TRACING: CPT | Performed by: EMERGENCY MEDICINE

## 2021-03-08 PROCEDURE — 74011250636 HC RX REV CODE- 250/636: Performed by: EMERGENCY MEDICINE

## 2021-03-08 RX ORDER — MECLIZINE HYDROCHLORIDE 25 MG/1
50 TABLET ORAL
Status: COMPLETED | OUTPATIENT
Start: 2021-03-08 | End: 2021-03-08

## 2021-03-08 RX ORDER — DIAZEPAM 5 MG/1
5 TABLET ORAL
Status: COMPLETED | OUTPATIENT
Start: 2021-03-08 | End: 2021-03-08

## 2021-03-08 RX ADMIN — DIAZEPAM 5 MG: 5 TABLET ORAL at 20:18

## 2021-03-08 RX ADMIN — MECLIZINE HYDROCHLORIDE 50 MG: 25 TABLET ORAL at 20:18

## 2021-03-08 NOTE — ED TRIAGE NOTES
Arrives via EMS from [facility] post acute. Staff reports nausea and dizziness when he stands.  Denies nausea and dizziness at rest

## 2021-03-09 NOTE — ED NOTES
I have reviewed discharge instructions with the patient. The patient verbalized understanding. Patient left ED via Discharge Method: ambulatory to Home with [facility]    Opportunity for questions and clarification provided. Patient given 0 scripts. To continue your aftercare when you leave the hospital, you may receive an automated call from our care team to check in on how you are doing. This is a free service and part of our promise to provide the best care and service to meet your aftercare needs.  If you have questions, or wish to unsubscribe from this service please call [phone]. Thank you for Choosing our [facility] Department.

## 2021-03-09 NOTE — ED PROVIDER NOTES
[address]     Alice Hightower is a 81 y.o. male seen on 3/10/2021 in the [facility] EMERGENCY DEPT in room ERF/F. Chief Complaint   Patient presents with    Vomiting     HPI: 61-year-old male presented to the emergency department with complaints of 2 days of significant dizziness with the room spinning. 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 Patient has been having vomiting for the past 2 days as well and family is concerned that patient could have another bleed. Patient has vomited twice today. Patient complains of just everything spinning and being extremely dizzy. He has no other real complaints or focal neuro deficits. Historian: Patient/family/previous medical records    REVIEW OF SYSTEMS     Review of Systems   Constitutional: Positive for activity change. HENT: Negative. Respiratory: Negative. Cardiovascular: Negative. Gastrointestinal: Positive for nausea and vomiting. Genitourinary: Negative. Musculoskeletal: Negative. Skin: Negative. Neurological: Positive for dizziness. Hematological: Negative. Psychiatric/Behavioral: Negative. All other systems reviewed and are negative.       PAST MEDICAL HISTORY     Past Medical History:   Diagnosis Date    Abdominal pain 5/9/2016    Acute prostatitis     Backache, unspecified     Calculus of kidney     Cancer [address]     prostate    Diabetes ([facility] [facility]     hypoglycemia    Diverticulosis 1/14/14    Edema 5/9/2016    Elevated prostate specific antigen (PSA)     Essential hypertension, benign     GERD (gastroesophageal reflux disease)     H. pylori infection 12/1/14    s/p treatment    History of barium enema 3/18/14    ordered by Dr. Doc Valera, GI    History of BPH     Hypertension     Hypertrophy of prostate without urinary obstruction and other lower urinary tract symptoms (LUTS)     Impotence of organic origin     Lumbago     Malignant neoplasm of prostate ([facility] [address]     Microscopic hematuria     Multiple renal cysts 14    left kidney, by CT    Osteopenia determined by x-ray     Dexa, T Score -1.0    Other ill-defined conditions(799.89)     chronic back pain    PVCs (premature ventricular contractions) 2016    [facility] 2016     Past Surgical History:   Procedure Laterality Date    HX CHOLECYSTECTOMY      HX COLONOSCOPY      2 polyps benign    HX ENDOSCOPY  2017    HX HERNIA REPAIR      midline incisional    HX OTHER SURGICAL      prostate; melanoma also-back; hernia    HX RADICAL PROSTATECTOMY      HX TONSIL AND ADENOIDECTOMY      HX UROLOGICAL      cystoscopy with urethral dilatation     Social History     Socioeconomic History    Marital status:      Spouse name: Not on file    Number of children: 2    Years of education: Not on file    Highest education level: Not on file   Tobacco Use    Smoking status: Former Smoker     Packs/day: 2.00     Years: 22.00     Pack years: 44.00     Types: Cigarettes     Quit date: 3/5/1980     Years since quittin.0    Smokeless tobacco: Never Used   Substance and Sexual Activity    Alcohol use: Yes     Alcohol/week: 0.0 standard drinks     Comment: 2-3 times weekly -one glass of wine    Drug use: No     Prior to Admission Medications   Prescriptions Last Dose Informant Patient Reported? Taking? ALPHA LIPOIC ACID PO   Yes No   Sig: Take  by mouth. COQ10, UBIQUINOL, PO   Yes No   Sig: Take  by mouth. TURMERIC PO   Yes No   Sig: Take  by mouth. acetylcarnitine HCl (ACETYL L-CARNITINE PO)   Yes No   Sig: Take  by mouth. aspirin 81 mg chewable tablet   No No   Sig: Take 1 Tab by mouth daily. atorvastatin (LIPITOR) 40 mg tablet   No No   Sig: Take 1 Tab by mouth nightly. Indications: stroke prevention   betamethasone dipropionate (DIPROLENE) 0.05 % ointment   Yes No   Sig: Apply  to affected area two (2) times a day. cholecalciferol, vitamin D3, (VITAMIN D3) 2,000 unit tab   Yes No   Sig: Take  by mouth. dilTIAZem ER (CARDIZEM CD) 120 mg capsule   No No   Sig: TAKE 1 CAPSULE BY MOUTH EVERY DAY   docusate sodium (STOOL SOFTENER) 100 mg tab   Yes No   Sig: Take  by mouth. fexofenadine (ALLEGRA) 180 mg tablet   Yes No   Sig: Take 180 mg by mouth daily as needed for Allergies. fluticasone propionate (FLONASE) 50 mcg/actuation nasal spray   No No   Sig: SPRAY 2 SPRAYS INTO EACH NOSTRIL EVERY DAY   folic acid-vit R9-WZQ Q32 2.2-25-1 mg tab   Yes No   Sig: Take  by mouth. hydrALAZINE (APRESOLINE) 100 mg tablet   No No   Sig: Take 1 Tab by mouth three (3) times daily. Indications: high blood pressure   nystatin-triamcinolone (MYCOLOG II) topical cream   No No   Sig: Apply  to affected area two (2) times daily as needed for Skin Irritation. sucralfate (CARAFATE) 100 mg/mL suspension   No No   Sig: Take 5 mL by mouth four (4) times daily. triamcinolone acetonide (KENALOG) 0.025 % topical cream   Yes No   Sig: Apply  to affected area two (2) times a day. use thin layer      Facility-Administered Medications: None     Allergies   Allergen Reactions    Amiodarone Rash    Benazepril Unknown (comments)    Hydrochlorothiazide Other (comments)     Hand redness and swelling.  Lisinopril Cough    Valsartan Other (comments)     lightheaded        PHYSICAL EXAM       Vitals:    03/08/21 1838 03/08/21 2059 03/08/21 2212 03/08/21 2213   BP: (!) 148/70 132/67 (!) 161/77    Pulse: (!) 55 (!) 45     Resp: 18      Temp:       SpO2: 95% 100%  98%    Vital signs were reviewed. Physical Exam  Vitals signs and nursing note reviewed. Constitutional:       General: He is not in acute distress. Appearance: He is not ill-appearing or toxic-appearing. HENT:      Head: Normocephalic and atraumatic. Mouth/Throat:      Mouth: Mucous membranes are moist.   Eyes:      Extraocular Movements: Extraocular movements intact. Pupils: Pupils are equal, round, and reactive to light.      Comments: Bidirectional horizontal nystagmus   Cardiovascular:      Rate and Rhythm: Normal rate and regular rhythm.      Pulses: Normal pulses.      Heart sounds: Normal heart sounds.   Pulmonary:      Effort: Pulmonary effort is normal.      Breath sounds: Normal breath sounds.   Abdominal:      Palpations: Abdomen is soft.      Tenderness: There is no abdominal tenderness. There is no guarding.   Musculoskeletal: Normal range of motion.   Skin:     General: Skin is warm and dry.   Neurological:      General: No focal deficit present.      Mental Status: He is alert and oriented to person, place, and time.      Comments: Bidirectional horizontal nystagmus   Psychiatric:         Mood and Affect: Mood normal.         Behavior: Behavior normal.         Thought Content: Thought content normal.         Judgment: Judgment normal.          MEDICAL DECISION MAKING     ED Course:    No results found for this or any previous visit (from the past 8 hour(s)).  No results found.       MDM  Number of Diagnoses or Management Options  Dizziness  Vertigo  Diagnosis management comments: 85-year-old male with history of multiple cerebellar bleeds presents emergency department with 2 days of dizziness.  Patient's CT is negative for acute bleed today.  Patient dizziness not improved while in the emergency department with Antivert and Valium.  Discussed at length with patient and patient's family.  Discussed admission for possible cerebellar infarct however he is unsure what therapeutic changes can be made secondary to patient's history of cerebellar bleed.  Through shared decision making, patient will be discharged back to skilled nursing facility.  He has you to be discharged from the skilled nursing facility later this week.  If patient has continued dizziness or changes in symptoms, patient will follow up as an outpatient with neurology.       Amount and/or Complexity of  Data Reviewed  Clinical lab tests: ordered and reviewed  Tests in the radiology section of CPT®: ordered and reviewed  Decide to obtain previous medical records or to obtain history from someone other than the patient: yes  Review and summarize past medical records: yes  Independent visualization of images, tracings, or specimens: yes    Patient Progress  Patient progress: stable        Disposition:  Discharged  Diagnosis:     ICD-10-CM ICD-9-CM   1. Dizziness  R42 780.4   2. Vertigo  R42 780.4     ____________________________________________________________________  A portion of this note was generated using voice recognition dictation software. While the note has been reviewed for accuracy, please note certain words and phrases may not be transcribed as intended and some grammatical and/or typographical errors may be present.

## 2021-06-30 PROBLEM — R29.818 SUSPECTED SLEEP APNEA: Status: ACTIVE | Noted: 2021-06-30

## 2021-07-29 ENCOUNTER — HOSPITAL ENCOUNTER (OUTPATIENT)
Dept: MAMMOGRAPHY | Age: 86
Discharge: HOME | End: 2021-07-29
Attending: NURSE PRACTITIONER
Payer: MEDICARE

## 2021-07-29 DIAGNOSIS — N64.4 BREAST PAIN IN MALE: ICD-10-CM

## 2021-07-29 DIAGNOSIS — N64.4 BREAST PAIN, RIGHT: ICD-10-CM

## 2021-07-29 DIAGNOSIS — N60.01 BREAST CYST, RIGHT: ICD-10-CM

## 2021-07-29 PROCEDURE — 77066 DX MAMMO INCL CAD BI: CPT

## 2021-07-29 PROCEDURE — 76642 ULTRASOUND BREAST LIMITED: CPT

## 2021-07-30 NOTE — PROGRESS NOTES
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 He can use ice or tylenol as needed for discomfort. Return if symptoms worsen.

## 2021-08-09 ENCOUNTER — APPOINTMENT (OUTPATIENT)
Dept: CT IMAGING | Age: 86
End: 2021-08-09
Attending: EMERGENCY MEDICINE
Payer: MEDICARE

## 2021-08-09 ENCOUNTER — HOSPITAL ENCOUNTER (EMERGENCY)
Age: 86
Discharge: HOME | End: 2021-08-09
Attending: EMERGENCY MEDICINE
Payer: MEDICARE

## 2021-08-09 VITALS
OXYGEN SATURATION: 97 % | WEIGHT: 160 LBS | RESPIRATION RATE: 18 BRPM | TEMPERATURE: 99 F | HEART RATE: 60 BPM | SYSTOLIC BLOOD PRESSURE: 128 MMHG | BODY MASS INDEX: 23.63 KG/M2 | DIASTOLIC BLOOD PRESSURE: 70 MMHG

## 2021-08-09 DIAGNOSIS — R53.1 WEAKNESS: Primary | ICD-10-CM

## 2021-08-09 LAB
ANION GAP SERPL CALC-SCNC: 7 MMOL/L (ref 7–16)
ATRIAL RATE: 55 BPM
BACTERIA URNS QL MICRO: 0 /HPF
BASOPHILS # BLD: 0 K/UL (ref 0–0.2)
BASOPHILS NFR BLD: 1 % (ref 0–2)
BUN SERPL-MCNC: 30 MG/DL (ref 8–23)
CALCIUM SERPL-MCNC: 9.1 MG/DL (ref 8.3–10.4)
CALCULATED P AXIS, ECG09: 35 DEGREES
CALCULATED R AXIS, ECG10: 31 DEGREES
CALCULATED T AXIS, ECG11: 8 DEGREES
CASTS URNS QL MICRO: NORMAL /LPF
CHLORIDE SERPL-SCNC: 108 MMOL/L (ref 98–107)
CO2 SERPL-SCNC: 29 MMOL/L (ref 21–32)
CREAT SERPL-MCNC: 1.25 MG/DL (ref 0.8–1.5)
DIAGNOSIS, 93000: NORMAL
DIFFERENTIAL METHOD BLD: ABNORMAL
EOSINOPHIL # BLD: 0.1 K/UL (ref 0–0.8)
EOSINOPHIL NFR BLD: 1 % (ref 0.5–7.8)
EPI CELLS #/AREA URNS HPF: 0 /HPF
ERYTHROCYTE [DISTWIDTH] IN BLOOD BY AUTOMATED COUNT: 13.5 % (ref 11.9–14.6)
GLUCOSE BLD STRIP.AUTO-MCNC: 107 MG/DL (ref 65–100)
GLUCOSE SERPL-MCNC: 103 MG/DL (ref 65–100)
HCT VFR BLD AUTO: 38.5 % (ref 41.1–50.3)
HGB BLD-MCNC: 12.6 G/DL (ref 13.6–17.2)
IMM GRANULOCYTES # BLD AUTO: 0 K/UL (ref 0–0.5)
IMM GRANULOCYTES NFR BLD AUTO: 0 % (ref 0–5)
INR PPP: 1
LYMPHOCYTES # BLD: 1.3 K/UL (ref 0.5–4.6)
LYMPHOCYTES NFR BLD: 20 % (ref 13–44)
MCH RBC QN AUTO: 30.7 PG (ref 26.1–32.9)
MCHC RBC AUTO-ENTMCNC: 32.7 G/DL (ref 31.4–35)
MCV RBC AUTO: 93.7 FL (ref 79.6–97.8)
MONOCYTES # BLD: 0.8 K/UL (ref 0.1–1.3)
MONOCYTES NFR BLD: 11 % (ref 4–12)
NEUTS SEG # BLD: 4.6 K/UL (ref 1.7–8.2)
NEUTS SEG NFR BLD: 67 % (ref 43–78)
NRBC # BLD: 0 K/UL (ref 0–0.2)
P-R INTERVAL, ECG05: 156 MS
PLATELET # BLD AUTO: 171 K/UL (ref 150–450)
PMV BLD AUTO: 9.8 FL (ref 9.4–12.3)
POTASSIUM SERPL-SCNC: 3.7 MMOL/L (ref 3.5–5.1)
PROTHROMBIN TIME: 13.7 SEC (ref 12.5–14.7)
Q-T INTERVAL, ECG07: 458 MS
QRS DURATION, ECG06: 96 MS
QTC CALCULATION (BEZET), ECG08: 438 MS
RBC # BLD AUTO: 4.11 M/UL (ref 4.23–5.6)
RBC #/AREA URNS HPF: NORMAL /HPF
SERVICE CMNT-IMP: ABNORMAL
SODIUM SERPL-SCNC: 144 MMOL/L (ref 136–145)
TROPONIN-HIGH SENSITIVITY: 13.3 PG/ML (ref 0–14)
TROPONIN-HIGH SENSITIVITY: 14.1 PG/ML (ref 0–14)
VENTRICULAR RATE, ECG03: 55 BPM
WBC # BLD AUTO: 6.8 K/UL (ref 4.3–11.1)
WBC URNS QL MICRO: NORMAL /HPF

## 2021-08-09 PROCEDURE — 85610 PROTHROMBIN TIME: CPT

## 2021-08-09 PROCEDURE — 81015 MICROSCOPIC EXAM OF URINE: CPT

## 2021-08-09 PROCEDURE — 99285 EMERGENCY DEPT VISIT HI MDM: CPT

## 2021-08-09 PROCEDURE — 93005 ELECTROCARDIOGRAM TRACING: CPT | Performed by: EMERGENCY MEDICINE

## 2021-08-09 PROCEDURE — 80048 BASIC METABOLIC PNL TOTAL CA: CPT

## 2021-08-09 PROCEDURE — 85025 COMPLETE CBC W/AUTO DIFF WBC: CPT

## 2021-08-09 PROCEDURE — 82962 GLUCOSE BLOOD TEST: CPT

## 2021-08-09 PROCEDURE — 70450 CT HEAD/BRAIN W/O DYE: CPT

## 2021-08-09 PROCEDURE — 81003 URINALYSIS AUTO W/O SCOPE: CPT

## 2021-08-09 PROCEDURE — 84484 ASSAY OF TROPONIN QUANT: CPT

## 2021-08-09 PROCEDURE — 74011250636 HC RX REV CODE- 250/636: Performed by: EMERGENCY MEDICINE

## 2021-08-09 PROCEDURE — 96360 HYDRATION IV INFUSION INIT: CPT

## 2021-08-09 RX ORDER — SODIUM CHLORIDE 0.9 % (FLUSH) 0.9 %
5-10 SYRINGE (ML) INJECTION AS NEEDED
Status: DISCONTINUED | OUTPATIENT
Start: 2021-08-09 | End: 2021-08-09 | Stop reason: HOSPADM

## 2021-08-09 RX ORDER — SODIUM CHLORIDE 0.9 % (FLUSH) 0.9 %
5-10 SYRINGE (ML) INJECTION EVERY 8 HOURS
Status: DISCONTINUED | OUTPATIENT
Start: 2021-08-09 | End: 2021-08-09 | Stop reason: HOSPADM

## 2021-08-09 RX ADMIN — SODIUM CHLORIDE, SODIUM LACTATE, POTASSIUM CHLORIDE, AND CALCIUM CHLORIDE 500 ML: 600; 310; 30; 20 INJECTION, SOLUTION INTRAVENOUS at 16:31

## 2021-08-09 NOTE — ED NOTES
I have reviewed discharge instructions with the patient and spouse. The patient and spouse verbalized understanding. Patient left ED via Discharge Method: wheelchair to Home with spouse. Opportunity for questions and clarification provided. Patient given 0 scripts. To continue your aftercare when you leave the hospital, you may receive an automated call from our care team to check in on how you are doing. This is a free service and part of our promise to provide the best care and service to meet your aftercare needs.  If you have questions, or wish to unsubscribe from this service please call [phone]. Thank you for Choosing our [facility] Emergency Department.

## 2021-08-09 NOTE — ED NOTES
Has initial assessment done in triage. He has symptoms that began on Saturday of some global weakness some stool changes some slight dizziness. Will get work-up but is not an acute code stroke category patient because of timeline with no acute component today.   Patient had contacted both his neurologist (Dr. Abena Gaitan) and his primary care provider (Dr. Nat Wells)

## 2021-08-09 NOTE — DISCHARGE INSTRUCTIONS
Fluids  Follow blood pressures  Evidence of urinary tract infection or other infectious provoking cause you

## 2021-08-09 NOTE — ED TRIAGE NOTES
Patient presents via [facility] with complaints of hypertension, nausea and abdominal pain. Per EMS patient deniesd headache/dizziness. Family wanted patient to be seen d/t history of hemorrhagic stroke. RACE 0 family present at triage. Wife states that patient with complaints of dizziness, increased weakness with difficulty walking. Wife states that they called their primary care drHernandez And were told to come and be evaluated.

## 2021-08-10 NOTE — ED PROVIDER NOTES
History of hemorhaggic CVA and BP elevated. Patient to me with no complaint. No CP or SOB. No HA or new neuro changes. wife is very precise with his care and meds to my observation. Some mild general malaise but nothing focal since Saturday. Did vomit a small amout a single time this morning. No mid-day meds. Usually constipated but had BM yesterday and today slightly loose. Had Pfizer Covid x both. In ER feels fine and actually looks better and states feels some better    The history is provided by the patient and the spouse. Hypertension   The problem has been gradually improving. Associated symptoms include vomiting. Pertinent negatives include no chest pain, no PND, no peripheral edema and no dizziness.         Past Medical History:   Diagnosis Date    Abdominal pain 5/9/2016    Acute prostatitis     Backache, unspecified     Calculus of kidney     Cancer ([address]     prostate    Diabetes ([address]     hypoglycemia    Diverticulosis 1/14/14    Edema 5/9/2016    Elevated prostate specific antigen (PSA)     Essential hypertension, benign     GERD (gastroesophageal reflux disease)     H. pylori infection 12/1/14    s/p treatment    History of barium enema 3/18/14    ordered by Dr. Amirah Hermosillo, GI    History of BPH     Hypertension     Hypertrophy of prostate without urinary obstruction and other lower urinary tract symptoms (LUTS)     Impotence of organic origin     Lumbago     Malignant neoplasm of prostate ([address]     Microscopic hematuria     Multiple renal cysts 1/14/14    left kidney, by CT    Osteopenia determined by x-ray     Dexa, T Score -1.0    Other ill-defined conditions(799.89)     chronic back pain    PVCs (premature ventricular contractions) 5/9/2016    SSS (sick sinus syndrome) ([address] 5/9/2016    Stroke ([address] 12/15/2020       Past Surgical History:   Procedure Laterality Date    HX CHOLECYSTECTOMY      HX COLONOSCOPY  2010    2 polyps benign    HX ENDOSCOPY  07/12/2017    HX HERNIA REPAIR      midline incisional    HX OTHER SURGICAL      prostate; melanoma also-back; hernia    HX RADICAL PROSTATECTOMY      HX TONSIL AND ADENOIDECTOMY      HX UROLOGICAL      cystoscopy with urethral dilatation         Family History:   Problem Relation Age of Onset    Alzheimer Mother     Prostate Cancer Father        Social History     Socioeconomic History    Marital status:      Spouse name: Not on file    Number of children: 2    Years of education: Not on file    Highest education level: Not on file   Occupational History    Not on file   Tobacco Use    Smoking status: Former Smoker     Packs/day: 2.00     Years: 22.00     Pack years: 44.00     Types: Cigarettes     Quit date: 3/5/1980     Years since quittin.4    Smokeless tobacco: Never Used   Substance and Sexual Activity    Alcohol use: Not Currently     Comment: occasionally    Drug use: No    Sexual activity: Not on file   Other Topics Concern    Not on file   Social History Narrative    Not on file     Social Determinants of Health     Financial Resource Strain:     Difficulty of Paying Living Expenses:    Food Insecurity:     Worried About Running Out of Food in the Last Year:     Ran Out of Food in the Last Year:    Transportation Needs:     Lack of Transportation (Medical):  Lack of Transportation (Non-Medical):    Physical Activity:     Days of Exercise per Week:     Minutes of Exercise per Session:    Stress:     Feeling of Stress :    Social Connections:     Frequency of Communication with Friends and Family:     Frequency of Social Gatherings with Friends and Family:     Attends Yaz[employer] Services:     Active Member of Clubs or Organizations:     Attends Club or Organization Meetings:     Marital Status:    Intimate Partner Violence:     Fear of Current or Ex-Partner:     Emotionally Abused:     Physically Abused:     Sexually Abused:           ALLERGIES: Amiodarone, Benazepril, Hydrochlorothiazide, Lisinopril, and Valsartan    Review of Systems   Cardiovascular: Negative for chest pain and PND. Gastrointestinal: Positive for constipation and vomiting. Negative for abdominal pain, anal bleeding and blood in stool. Genitourinary: Negative for difficulty urinating, dysuria and flank pain. Neurological: Negative for dizziness. No acute changes   Psychiatric/Behavioral:        Baseline   All other systems reviewed and are negative. Vitals:    08/09/21 1704 08/09/21 1723 08/09/21 1743 08/09/21 1757   BP: (!) 149/68 (!) 159/69 128/71 128/70   Pulse: (!) 44 (!) 47 (!) 55 60   Resp:    18   Temp:       SpO2: 94% 92% 96% 97%   Weight:                Physical Exam  Vitals and nursing note reviewed. Constitutional:       General: He is not in acute distress. Appearance: He is well-developed. HENT:      Head: Atraumatic. Eyes:      General: No scleral icterus. Cardiovascular:      Rate and Rhythm: Regular rhythm. Bradycardia present. Pulses: Normal pulses. Comments: Has rates in low 46s in old charts at time  Pulmonary:      Effort: Pulmonary effort is normal. No respiratory distress. Breath sounds: Normal breath sounds. Abdominal:      General: Abdomen is flat. Musculoskeletal:         General: No tenderness or signs of injury. Cervical back: Neck supple. Skin:     General: Skin is warm and dry. Coloration: Skin is not jaundiced or pale. Findings: No erythema. Neurological:      Mental Status: Mental status is at baseline. Comments: baseline   Psychiatric:         Behavior: Behavior normal.         Thought Content: Thought content normal.          MDM  Number of Diagnoses or Management Options  Weakness  Diagnosis management comments: Does come in with BP above target. Given home meds at one point and better. Does look and state some better after IVF.  Wife very attentive and can return home but return with any changes       Amount and/or Complexity of Data Reviewed  Clinical lab tests: ordered and reviewed  Tests in the radiology section of CPT®: reviewed and ordered (No acute changes)  Tests in the medicine section of CPT®: (===============================================  ED EKG Interpretation  EKG was interpreted in the absence of a cardiologist.    EKG rhythm: sinus bradycardia  Rate: 55  ST Segments: Nonspecific ST segments - NO STEMI      Mallory Meyer MD; 8/10/2021 @5:15 PM================    )  Decide to obtain previous medical records or to obtain history from someone other than the patient: yes  Obtain history from someone other than the patient: yes (wife)  Review and summarize past medical records: yes  Independent visualization of images, tracings, or specimens: yes    Risk of Complications, Morbidity, and/or Mortality  Presenting problems: moderate  Diagnostic procedures: moderate  Management options: moderate    Patient Progress  Patient progress: stable         Procedures

## 2021-08-16 ENCOUNTER — HOSPITAL ENCOUNTER (OUTPATIENT)
Dept: SLEEP MEDICINE | Age: 86
Discharge: HOME | End: 2021-08-16
Payer: MEDICARE

## 2021-08-16 PROCEDURE — 95810 POLYSOM 6/> YRS 4/> PARAM: CPT

## 2021-08-25 PROBLEM — R26.81 GAIT INSTABILITY: Status: ACTIVE | Noted: 2021-08-25

## 2021-08-25 PROBLEM — R26.89 IMPAIRMENT OF BALANCE: Status: ACTIVE | Noted: 2021-08-25

## 2021-08-25 PROBLEM — R42 VERTIGO: Status: ACTIVE | Noted: 2021-08-25

## 2021-08-30 ENCOUNTER — APPOINTMENT (OUTPATIENT)
Dept: CT IMAGING | Age: 86
End: 2021-08-30
Attending: EMERGENCY MEDICINE
Payer: MEDICARE

## 2021-08-30 ENCOUNTER — HOSPITAL ENCOUNTER (EMERGENCY)
Age: 86
Discharge: HOME | End: 2021-08-30
Attending: EMERGENCY MEDICINE
Payer: MEDICARE

## 2021-08-30 VITALS
TEMPERATURE: 98.5 F | OXYGEN SATURATION: 95 % | RESPIRATION RATE: 18 BRPM | WEIGHT: 155 LBS | BODY MASS INDEX: 22.96 KG/M2 | HEIGHT: 69 IN | DIASTOLIC BLOOD PRESSURE: 75 MMHG | SYSTOLIC BLOOD PRESSURE: 126 MMHG | HEART RATE: 65 BPM

## 2021-08-30 DIAGNOSIS — R10.32 ABDOMINAL PAIN, LLQ (LEFT LOWER QUADRANT): ICD-10-CM

## 2021-08-30 DIAGNOSIS — N23 RENAL COLIC: Primary | ICD-10-CM

## 2021-08-30 DIAGNOSIS — R31.9 HEMATURIA, UNSPECIFIED TYPE: ICD-10-CM

## 2021-08-30 LAB
ALBUMIN SERPL-MCNC: 3.6 G/DL (ref 3.2–4.6)
ALBUMIN/GLOB SERPL: 1 {RATIO} (ref 1.2–3.5)
ALP SERPL-CCNC: 53 U/L (ref 50–136)
ALT SERPL-CCNC: 9 U/L (ref 12–65)
ANION GAP SERPL CALC-SCNC: 4 MMOL/L (ref 7–16)
AST SERPL-CCNC: 11 U/L (ref 15–37)
BACTERIA URNS QL MICRO: 0 /HPF
BASOPHILS # BLD: 0 K/UL (ref 0–0.2)
BASOPHILS NFR BLD: 0 % (ref 0–2)
BILIRUB SERPL-MCNC: 0.6 MG/DL (ref 0.2–1.1)
BUN SERPL-MCNC: 36 MG/DL (ref 8–23)
CALCIUM SERPL-MCNC: 9.3 MG/DL (ref 8.3–10.4)
CASTS URNS QL MICRO: 0 /LPF
CHLORIDE SERPL-SCNC: 104 MMOL/L (ref 98–107)
CO2 SERPL-SCNC: 33 MMOL/L (ref 21–32)
CREAT SERPL-MCNC: 1.6 MG/DL (ref 0.8–1.5)
CRYSTALS URNS QL MICRO: 0 /LPF
DIFFERENTIAL METHOD BLD: ABNORMAL
EOSINOPHIL # BLD: 0 K/UL (ref 0–0.8)
EOSINOPHIL NFR BLD: 0 % (ref 0.5–7.8)
EPI CELLS #/AREA URNS HPF: 0 /HPF
ERYTHROCYTE [DISTWIDTH] IN BLOOD BY AUTOMATED COUNT: 13.4 % (ref 11.9–14.6)
GLOBULIN SER CALC-MCNC: 3.6 G/DL (ref 2.3–3.5)
GLUCOSE SERPL-MCNC: 137 MG/DL (ref 65–100)
HCT VFR BLD AUTO: 37.5 % (ref 41.1–50.3)
HGB BLD-MCNC: 12.5 G/DL (ref 13.6–17.2)
IMM GRANULOCYTES # BLD AUTO: 0 K/UL (ref 0–0.5)
IMM GRANULOCYTES NFR BLD AUTO: 0 % (ref 0–5)
LIPASE SERPL-CCNC: 97 U/L (ref 73–393)
LYMPHOCYTES # BLD: 0.6 K/UL (ref 0.5–4.6)
LYMPHOCYTES NFR BLD: 7 % (ref 13–44)
MCH RBC QN AUTO: 30.6 PG (ref 26.1–32.9)
MCHC RBC AUTO-ENTMCNC: 33.3 G/DL (ref 31.4–35)
MCV RBC AUTO: 91.9 FL (ref 79.6–97.8)
MONOCYTES # BLD: 0.9 K/UL (ref 0.1–1.3)
MONOCYTES NFR BLD: 10 % (ref 4–12)
MUCOUS THREADS URNS QL MICRO: 0 /LPF
NEUTS SEG # BLD: 7.7 K/UL (ref 1.7–8.2)
NEUTS SEG NFR BLD: 83 % (ref 43–78)
NRBC # BLD: 0 K/UL (ref 0–0.2)
PLATELET # BLD AUTO: 163 K/UL (ref 150–450)
PMV BLD AUTO: 9.7 FL (ref 9.4–12.3)
POTASSIUM SERPL-SCNC: 3.3 MMOL/L (ref 3.5–5.1)
PROT SERPL-MCNC: 7.2 G/DL (ref 6.3–8.2)
RBC # BLD AUTO: 4.08 M/UL (ref 4.23–5.6)
RBC #/AREA URNS HPF: NORMAL /HPF
SODIUM SERPL-SCNC: 141 MMOL/L (ref 136–145)
WBC # BLD AUTO: 9.3 K/UL (ref 4.3–11.1)
WBC URNS QL MICRO: NORMAL /HPF

## 2021-08-30 PROCEDURE — 83690 ASSAY OF LIPASE: CPT

## 2021-08-30 PROCEDURE — 85025 COMPLETE CBC W/AUTO DIFF WBC: CPT

## 2021-08-30 PROCEDURE — 81015 MICROSCOPIC EXAM OF URINE: CPT

## 2021-08-30 PROCEDURE — 99284 EMERGENCY DEPT VISIT MOD MDM: CPT

## 2021-08-30 PROCEDURE — 80053 COMPREHEN METABOLIC PANEL: CPT

## 2021-08-30 PROCEDURE — 74176 CT ABD & PELVIS W/O CONTRAST: CPT

## 2021-08-30 RX ORDER — ONDANSETRON 4 MG/1
4 TABLET, ORALLY DISINTEGRATING ORAL
Qty: 6 TABLET | Refills: 0 | Status: SHIPPED | OUTPATIENT
Start: 2021-08-30 | End: 2021-11-24 | Stop reason: ALTCHOICE

## 2021-08-30 RX ORDER — SODIUM CHLORIDE 0.9 % (FLUSH) 0.9 %
5-10 SYRINGE (ML) INJECTION EVERY 8 HOURS
Status: DISCONTINUED | OUTPATIENT
Start: 2021-08-30 | End: 2021-08-30 | Stop reason: HOSPADM

## 2021-08-30 RX ORDER — TAMSULOSIN HYDROCHLORIDE 0.4 MG/1
0.4 CAPSULE ORAL DAILY
Qty: 7 CAPSULE | Refills: 0 | Status: SHIPPED | OUTPATIENT
Start: 2021-08-30 | End: 2021-09-06

## 2021-08-30 RX ORDER — SODIUM CHLORIDE 0.9 % (FLUSH) 0.9 %
5-10 SYRINGE (ML) INJECTION AS NEEDED
Status: DISCONTINUED | OUTPATIENT
Start: 2021-08-30 | End: 2021-08-30 | Stop reason: HOSPADM

## 2021-08-30 RX ORDER — MORPHINE SULFATE 15 MG/1
7.5 TABLET ORAL
Qty: 6 TABLET | Refills: 0 | Status: SHIPPED | OUTPATIENT
Start: 2021-08-30 | End: 2021-09-02

## 2021-08-30 NOTE — ED TRIAGE NOTES
Patient presents via EMS from home with c/o LLQ abdominal pain, and bright red blood in his urine earlier today. Patient also started a new antibiotic today for the blood in his urine.

## 2021-08-30 NOTE — ED NOTES
Ronny arrived. Report and all paperwork given. Pt taken in nad. I have reviewed discharge instructions with the patient. The patient verbalized understanding. Patient left ED via Discharge Method: stretcher to Home with ronny. Opportunity for questions and clarification provided. Patient given 3 scripts. To continue your aftercare when you leave the hospital, you may receive an automated call from our care team to check in on how you are doing. This is a free service and part of our promise to provide the best care and service to meet your aftercare needs.  If you have questions, or wish to unsubscribe from this service please call [phone]. Thank you for Choosing our [facility] Emergency Department.

## 2021-08-30 NOTE — ED PROVIDER NOTES
The history is provided by the patient. Abdominal Pain   This is a new problem. The current episode started 6 to 12 hours ago. The problem occurs constantly. The problem has been resolved. The pain is associated with vomiting. The pain is located in the LLQ. The quality of the pain is sharp. The patient is experiencing no pain. Associated symptoms include nausea, vomiting and hematuria. Pertinent negatives include no fever, no diarrhea, no hematochezia, no melena, no constipation, no dysuria, no frequency, no headaches, no myalgias, no chest pain and no back pain. Nothing worsens the pain. The pain is relieved by nothing. Past medical history comments: Reviewed.  Reviewed       Past Medical History:   Diagnosis Date    Abdominal pain 5/9/2016    Acute prostatitis     Backache, unspecified     Calculus of kidney     Cancer ([address]     prostate    Diabetes ([address]     hypoglycemia    Diverticulosis 1/14/14    Edema 5/9/2016    Elevated prostate specific antigen (PSA)     Essential hypertension, benign     GERD (gastroesophageal reflux disease)     H. pylori infection 12/1/14    s/p treatment    History of barium enema 3/18/14    ordered by Dr. Claude Jansen, GI    History of BPH     Hypertension     Hypertrophy of prostate without urinary obstruction and other lower urinary tract symptoms (LUTS)     Impotence of organic origin     Lumbago     Malignant neoplasm of prostate ([address]     Microscopic hematuria     Multiple renal cysts 1/14/14    left kidney, by CT    Osteopenia determined by x-ray     Dexa, T Score -1.0    Other ill-defined conditions(799.89)     chronic back pain    PVCs (premature ventricular contractions) 5/9/2016    SSS (sick sinus syndrome) ([address] 5/9/2016    Stroke ([address] 12/15/2020       Past Surgical History:   Procedure Laterality Date    HX CHOLECYSTECTOMY      HX COLONOSCOPY  2010    2 polyps benign    HX ENDOSCOPY  07/12/2017    HX HERNIA REPAIR      midline incisional    HX OTHER SURGICAL      prostate; melanoma also-back; hernia    HX RADICAL PROSTATECTOMY      HX TONSIL AND ADENOIDECTOMY      HX UROLOGICAL      cystoscopy with urethral dilatation         Family History:   Problem Relation Age of Onset    Alzheimer Mother     Prostate Cancer Father        Social History     Socioeconomic History    Marital status:      Spouse name: Not on file    Number of children: 2    Years of education: Not on file    Highest education level: Not on file   Occupational History    Not on file   Tobacco Use    Smoking status: Former Smoker     Packs/day: 2.00     Years: 22.00     Pack years: 44.00     Types: Cigarettes     Quit date: 3/5/1980     Years since quittin.5    Smokeless tobacco: Never Used   Substance and Sexual Activity    Alcohol use: Not Currently     Comment: occasionally    Drug use: No    Sexual activity: Not on file   Other Topics Concern    Not on file   Social History Narrative    Not on file     Social Determinants of Health     Financial Resource Strain:     Difficulty of Paying Living Expenses:    Food Insecurity:     Worried About Running Out of Food in the Last Year:     Ran Out of Food in the Last Year:    Transportation Needs:     Lack of Transportation (Medical):  Lack of Transportation (Non-Medical):    Physical Activity:     Days of Exercise per Week:     Minutes of Exercise per Session:    Stress:     Feeling of Stress :    Social Connections:     Frequency of Communication with Friends and Family:     Frequency of Social Gatherings with Friends and Family:     Attends Confucianist Services:     Active Member of Clubs or Organizations:     Attends Club or Organization Meetings:     Marital Status:    Intimate Partner Violence:     Fear of Current or Ex-Partner:     Emotionally Abused:     Physically Abused:     Sexually Abused:           ALLERGIES: Amiodarone, Benazepril, Hydrochlorothiazide, Lisinopril, and Valsartan    Review of Systems   Constitutional: Negative for chills and fever. HENT: Negative for congestion, rhinorrhea and sore throat. Eyes: Negative for photophobia and redness. Respiratory: Negative for cough and shortness of breath. Cardiovascular: Negative for chest pain and leg swelling. Gastrointestinal: Positive for abdominal pain, nausea and vomiting. Negative for constipation, diarrhea, hematochezia and melena. Endocrine: Negative for polydipsia and polyuria. Genitourinary: Positive for hematuria. Negative for dysuria and frequency. Musculoskeletal: Negative for back pain and myalgias. Neurological: Negative for weakness, numbness and headaches. All other systems reviewed and are negative. Vitals:    08/30/21 0021   BP: 126/73   Pulse: 62   Resp: 18   Temp: 98.5 °F (36.9 °C)   SpO2: 96%   Weight: 70.3 kg (155 lb)   Height: 5' 9\" (1.753 m)            Physical Exam  Vitals and nursing note reviewed. Constitutional:       Appearance: He is well-developed. HENT:      Mouth/Throat:      Pharynx: No oropharyngeal exudate. Eyes:      Conjunctiva/sclera: Conjunctivae normal.      Pupils: Pupils are equal, round, and reactive to light. Cardiovascular:      Rate and Rhythm: Normal rate and regular rhythm. Heart sounds: Normal heart sounds. Pulmonary:      Effort: Pulmonary effort is normal.      Breath sounds: Normal breath sounds. Abdominal:      General: Bowel sounds are normal. There is no distension. Palpations: Abdomen is soft. Tenderness: There is no abdominal tenderness. There is no guarding or rebound. Musculoskeletal:         General: No tenderness. Normal range of motion. Cervical back: Neck supple. Lymphadenopathy:      Cervical: No cervical adenopathy. Skin:     General: Skin is warm and dry. Neurological:      Mental Status: He is alert and oriented to person, place, and time.           MDM  Number of Diagnoses or Management Options  Diagnosis management comments: Renal colic favored over diverticulitis given lack of tenderness. CT scan imaging ordered and pending. Currently no pain when I saw the patient. 3:26 AM  CT shows mild prominence of the left renal pelvis and ureter but it narrows and normalizes distally. No stone visualized. Patient does have hematuria and symptoms that were consistent with possible renal colic. He may have passed a small stone where there may be a small nonvisualized stone present. Follow-up with his urologist. Strain urine. Return if fever, uncontrolled pain.        Amount and/or Complexity of Data Reviewed  Clinical lab tests: ordered and reviewed (Results for orders placed or performed during the hospital encounter of 08/30/21  -CBC WITH AUTOMATED DIFF       Result                      Value             Ref Range           WBC                         9.3               4.3 - 11.1 K*       RBC                         4.08 (L)          4.23 - 5.6 M*       HGB                         12.5 (L)          13.6 - 17.2 *       HCT                         37.5 (L)          41.1 - 50.3 %       MCV                         91.9              79.6 - 97.8 *       MCH                         30.6              26.1 - 32.9 *       MCHC                        33.3              31.4 - 35.0 *       RDW                         13.4              11.9 - 14.6 %       PLATELET                    163               150 - 450 K/*       MPV                         9.7               9.4 - 12.3 FL       ABSOLUTE NRBC               0.00              0.0 - 0.2 K/*       DF                          AUTOMATED                             NEUTROPHILS                 83 (H)            43 - 78 %           LYMPHOCYTES                 7 (L)             13 - 44 %           MONOCYTES                   10                4.0 - 12.0 %        EOSINOPHILS                 0 (L)             0.5 - 7.8 %         BASOPHILS                   0 0.0 - 2.0 %         IMMATURE GRANULOCYTES       0                 0.0 - 5.0 %         ABS. NEUTROPHILS            7.7               1.7 - 8.2 K/*       ABS. LYMPHOCYTES            0.6               0.5 - 4.6 K/*       ABS. MONOCYTES              0.9               0.1 - 1.3 K/*       ABS. EOSINOPHILS            0.0               0.0 - 0.8 K/*       ABS. BASOPHILS              0.0               0.0 - 0.2 K/*       ABS. IMM. GRANS.            0.0               0.0 - 0.5 K/*  -METABOLIC PANEL, COMPREHENSIVE       Result                      Value             Ref Range           Sodium                      141               136 - 145 mm*       Potassium                   3.3 (L)           3.5 - 5.1 mm*       Chloride                    104               98 - 107 mmo*       CO2                         33 (H)            21 - 32 mmol*       Anion gap                   4 (L)             7 - 16 mmol/L       Glucose                     137 (H)           65 - 100 mg/*       BUN                         36 (H)            8 - 23 MG/DL        Creatinine                  1.60 (H)          0.8 - 1.5 MG*       GFR est AA                  53 (L)            >60 ml/min/1*       GFR est non-AA              44 (L)            >60 ml/min/1*       Calcium                     9.3               8.3 - 10.4 M*       Bilirubin, total            0.6               0.2 - 1.1 MG*       ALT (SGPT)                  9 (L)             12 - 65 U/L         AST (SGOT)                  11 (L)            15 - 37 U/L         Alk.  phosphatase            53                50 - 136 U/L        Protein, total              7.2               6.3 - 8.2 g/*       Albumin                     3.6               3.2 - 4.6 g/*       Globulin                    3.6 (H)           2.3 - 3.5 g/*       A-G Ratio                   1.0 (L)           1.2 - 3.5      -LIPASE       Result                      Value             Ref Range           Lipase                      97 73 - 393 U/L   -URINE MICROSCOPIC       Result                      Value             Ref Range           WBC                         3-5               0 /hpf              RBC                         10-20             0 /hpf              Epithelial cells            0                 0 /hpf              Bacteria                    0                 0 /hpf              Casts                       0                 0 /lpf              Crystals, urine             0                 0 /LPF              Mucus                       0                 0 /lpf         )  Tests in the radiology section of CPT®: ordered and reviewed (CT ABD/PEL FOR RENAL STONE    Result Date: 8/30/2021  EXAM: CT ABD/PEL FOR RENAL STONE HISTORY: Left lower quadrant pain, hematuria. TECHNIQUE: Axial images of the abdomen and pelvis were performed without intravenous contrast. Images were obtained in the axial plane and coronal reformatted images were created and reviewed. Dose reduction technique used: Automated exposure control/Adjustment of the mA and/or kV according to patient size/Use of iterative reconstruction technique. COMPARISON: 8/8/2018 FINDINGS: Visualized lung bases and mediastinum are unremarkable. The visualized liver, spleen, pancreas, adrenal glands, are within normal limits. The gallbladder has been removed. Both kidneys show stable, bilateral cysts. There are no renal or ureteral calcifications. There is mild prominence of the left renal pelvis and proximal ureter. The left ureter normalizes as it courses distally. The bladder contains several small diverticuli. There are no bladder calcifications. Distal esophagus and stomach are unremarkable. Small and large bowel are without evidence of obstruction. There is a normal appendix in the right lower quadrant. Diverticulosis with no evidence of acute diverticulitis. No evidence of free fluid, free air, focal fluid collection. No pathologic adenopathy.  No abdominal aortic aneurysm. Osseous structures are without evidence of acute fracture or suspicious lesion. Both kidneys show stable, bilateral cysts. There are no renal or ureteral calcifications. There is mild prominence of the left renal pelvis and proximal ureter. The left ureter normalizes as it courses distally. The bladder contains several small diverticuli. There are no bladder calcifications.  Diverticulosis with no evidence of acute diverticulitis.     )    Risk of Complications, Morbidity, and/or Mortality  Presenting problems: moderate  Diagnostic procedures: low  Management options: low    Patient Progress  Patient progress: stable         Procedures

## 2021-08-30 NOTE — ED TRIAGE NOTES
Pt reports llq pain with vomiting onset tonight. Pt also reports bright red blood in urine this morning.

## 2021-09-14 ENCOUNTER — HOSPITAL ENCOUNTER (OUTPATIENT)
Dept: SLEEP MEDICINE | Age: 86
Discharge: HOME | End: 2021-09-14
Payer: MEDICARE

## 2021-09-14 PROCEDURE — 95811 POLYSOM 6/>YRS CPAP 4/> PARM: CPT

## 2021-11-01 PROBLEM — G47.10 HYPERSOMNIA: Status: ACTIVE | Noted: 2021-11-01

## 2021-11-01 PROBLEM — G47.33 OSA (OBSTRUCTIVE SLEEP APNEA): Status: ACTIVE | Noted: 2021-06-30

## 2022-01-10 PROBLEM — G30.9 ALZHEIMER'S DISEASE, UNSPECIFIED (CODE) (HCC): Status: ACTIVE | Noted: 2022-01-10

## 2022-03-03 PROBLEM — I68.0 CEREBRAL AMYLOID ANGIOPATHY (CODE): Status: ACTIVE | Noted: 2022-03-03

## 2022-03-15 PROBLEM — G47.52 RBD (REM BEHAVIORAL DISORDER): Status: ACTIVE | Noted: 2022-03-15

## 2022-03-15 PROBLEM — G47.31 CENTRAL SLEEP APNEA: Status: ACTIVE | Noted: 2022-03-15

## 2022-03-18 PROBLEM — R26.89 IMPAIRMENT OF BALANCE: Status: ACTIVE | Noted: 2021-08-25

## 2022-03-18 PROBLEM — M54.2 CERVICALGIA: Status: ACTIVE | Noted: 2018-05-16

## 2022-03-18 PROBLEM — G47.33 OSA (OBSTRUCTIVE SLEEP APNEA): Status: ACTIVE | Noted: 2021-06-30

## 2022-03-18 PROBLEM — R42 VERTIGO: Status: ACTIVE | Noted: 2021-08-25

## 2022-03-19 PROBLEM — I61.4 CEREBELLAR BLEED (HCC): Status: ACTIVE | Noted: 2020-12-16

## 2022-03-19 PROBLEM — Z86.73 HISTORY OF ISCHEMIC STROKE: Status: ACTIVE | Noted: 2019-12-04

## 2022-03-19 PROBLEM — R07.2 PRECORDIAL PAIN: Status: ACTIVE | Noted: 2020-01-16

## 2022-03-19 PROBLEM — G30.9 ALZHEIMER'S DISEASE, UNSPECIFIED (CODE) (HCC): Status: ACTIVE | Noted: 2022-01-10

## 2022-03-19 PROBLEM — R26.81 GAIT INSTABILITY: Status: ACTIVE | Noted: 2021-08-25

## 2022-03-19 PROBLEM — Z85.828 STATUS POST MOHS SURGERY FOR SQUAMOUS CELL CARCINOMA OF SKIN: Status: ACTIVE | Noted: 2019-09-25

## 2022-03-19 PROBLEM — I73.9 PAD (PERIPHERAL ARTERY DISEASE) (HCC): Status: ACTIVE | Noted: 2018-08-13

## 2022-03-19 PROBLEM — I68.0 CEREBRAL AMYLOID ANGIOPATHY (CODE): Status: ACTIVE | Noted: 2022-03-03

## 2022-03-19 PROBLEM — Z98.890 STATUS POST MOHS SURGERY FOR SQUAMOUS CELL CARCINOMA OF SKIN: Status: ACTIVE | Noted: 2019-09-25

## 2022-03-19 PROBLEM — G47.10 HYPERSOMNIA: Status: ACTIVE | Noted: 2021-11-01

## 2022-03-24 PROBLEM — G47.31 CENTRAL SLEEP APNEA: Status: ACTIVE | Noted: 2022-03-15

## 2022-03-24 PROBLEM — G47.52 RBD (REM BEHAVIORAL DISORDER): Status: ACTIVE | Noted: 2022-03-15

## 2022-05-26 RX ORDER — SUCRALFATE 1 G/1
TABLET ORAL
COMMUNITY
Start: 2022-02-24 | End: 2022-05-26 | Stop reason: SDUPTHER

## 2022-05-26 RX ORDER — SUCRALFATE 1 G/1
1 TABLET ORAL 4 TIMES DAILY
Qty: 120 TABLET | Refills: 1 | Status: SHIPPED | OUTPATIENT
Start: 2022-05-26 | End: 2022-09-15 | Stop reason: SDUPTHER

## 2022-06-02 ENCOUNTER — OFFICE VISIT (OUTPATIENT)
Dept: INTERNAL MEDICINE CLINIC | Facility: CLINIC | Age: 87
End: 2022-06-02
Payer: MEDICARE

## 2022-06-02 VITALS
OXYGEN SATURATION: 98 % | TEMPERATURE: 98.3 F | DIASTOLIC BLOOD PRESSURE: 75 MMHG | RESPIRATION RATE: 16 BRPM | HEIGHT: 69 IN | WEIGHT: 155 LBS | HEART RATE: 59 BPM | SYSTOLIC BLOOD PRESSURE: 124 MMHG | BODY MASS INDEX: 22.96 KG/M2

## 2022-06-02 DIAGNOSIS — F01.518 VASCULAR DEMENTIA WITH BEHAVIORAL DISTURBANCE: ICD-10-CM

## 2022-06-02 DIAGNOSIS — I49.5 SSS (SICK SINUS SYNDROME) (HCC): ICD-10-CM

## 2022-06-02 DIAGNOSIS — Z00.00 MEDICARE ANNUAL WELLNESS VISIT, SUBSEQUENT: ICD-10-CM

## 2022-06-02 DIAGNOSIS — Z23 NEED FOR PROPHYLACTIC VACCINATION AGAINST STREPTOCOCCUS PNEUMONIAE (PNEUMOCOCCUS): ICD-10-CM

## 2022-06-02 DIAGNOSIS — N18.30 STAGE 3 CHRONIC KIDNEY DISEASE, UNSPECIFIED WHETHER STAGE 3A OR 3B CKD (HCC): ICD-10-CM

## 2022-06-02 DIAGNOSIS — Z23 NEED FOR PROPHYLACTIC VACCINATION AND INOCULATION AGAINST VARICELLA: ICD-10-CM

## 2022-06-02 DIAGNOSIS — I50.22 CHRONIC SYSTOLIC (CONGESTIVE) HEART FAILURE (HCC): ICD-10-CM

## 2022-06-02 PROCEDURE — 90677 PCV20 VACCINE IM: CPT | Performed by: INTERNAL MEDICINE

## 2022-06-02 PROCEDURE — 1123F ACP DISCUSS/DSCN MKR DOCD: CPT | Performed by: INTERNAL MEDICINE

## 2022-06-02 PROCEDURE — G8420 CALC BMI NORM PARAMETERS: HCPCS | Performed by: INTERNAL MEDICINE

## 2022-06-02 PROCEDURE — G8427 DOCREV CUR MEDS BY ELIG CLIN: HCPCS | Performed by: INTERNAL MEDICINE

## 2022-06-02 PROCEDURE — 99214 OFFICE O/P EST MOD 30 MIN: CPT | Performed by: INTERNAL MEDICINE

## 2022-06-02 PROCEDURE — G0439 PPPS, SUBSEQ VISIT: HCPCS | Performed by: INTERNAL MEDICINE

## 2022-06-02 PROCEDURE — 1036F TOBACCO NON-USER: CPT | Performed by: INTERNAL MEDICINE

## 2022-06-02 RX ORDER — SIMETHICONE 125 MG
CAPSULE ORAL
COMMUNITY

## 2022-06-02 RX ORDER — SENNOSIDES 8.6 MG
CAPSULE ORAL
COMMUNITY

## 2022-06-02 RX ORDER — UBIDECARENONE 100 MG
100 CAPSULE ORAL DAILY
COMMUNITY

## 2022-06-02 ASSESSMENT — PATIENT HEALTH QUESTIONNAIRE - PHQ9
SUM OF ALL RESPONSES TO PHQ QUESTIONS 1-9: 1
2. FEELING DOWN, DEPRESSED OR HOPELESS: 0
SUM OF ALL RESPONSES TO PHQ9 QUESTIONS 1 & 2: 1
1. LITTLE INTEREST OR PLEASURE IN DOING THINGS: 1

## 2022-06-02 ASSESSMENT — LIFESTYLE VARIABLES: HOW OFTEN DO YOU HAVE A DRINK CONTAINING ALCOHOL: NEVER

## 2022-06-02 NOTE — PATIENT INSTRUCTIONS
Personalized Preventive Plan for Maria Eugenia Holland - 6/2/2022  Medicare offers a range of preventive health benefits. Some of the tests and screenings are paid in full while other may be subject to a deductible, co-insurance, and/or copay. Some of these benefits include a comprehensive review of your medical history including lifestyle, illnesses that may run in your family, and various assessments and screenings as appropriate. After reviewing your medical record and screening and assessments performed today your provider may have ordered immunizations, labs, imaging, and/or referrals for you. A list of these orders (if applicable) as well as your Preventive Care list are included within your After Visit Summary for your review. Other Preventive Recommendations:    · A preventive eye exam performed by an eye specialist is recommended every 1-2 years to screen for glaucoma; cataracts, macular degeneration, and other eye disorders. · A preventive dental visit is recommended every 6 months. · Try to get at least 150 minutes of exercise per week or 10,000 steps per day on a pedometer . · Order or download the FREE \"Exercise & Physical Activity: Your Everyday Guide\" from The "[employer]. Call 2-982.736.3645 or search The [employer] online. · You need 7271-3363 mg of calcium and 2975-9496 IU of vitamin D per day. It is possible to meet your calcium requirement with diet alone, but a vitamin D supplement is usually necessary to meet this goal.  · When exposed to the sun, use a sunscreen that protects against both UVA and UVB radiation with an SPF of 30 or greater. Reapply every 2 to 3 hours or after sweating, drying off with a towel, or swimming. · Always wear a seat belt when traveling in a car. Always wear a helmet when riding a bicycle or motorcycle.

## 2022-06-10 LAB
ALBUMIN SERPL-MCNC: 3.8 G/DL (ref 3.2–4.6)
ALBUMIN/GLOB SERPL: 1.2 {RATIO} (ref 1.2–3.5)
ALP SERPL-CCNC: 60 U/L (ref 50–136)
ALT SERPL-CCNC: 20 U/L (ref 12–65)
ANION GAP SERPL CALC-SCNC: 7 MMOL/L (ref 7–16)
APPEARANCE UR: CLEAR
AST SERPL-CCNC: 17 U/L (ref 15–37)
BASOPHILS # BLD: 0.1 K/UL (ref 0–0.2)
BASOPHILS NFR BLD: 1 % (ref 0–2)
BILIRUB SERPL-MCNC: 0.4 MG/DL (ref 0.2–1.1)
BILIRUB UR QL: NEGATIVE
BUN SERPL-MCNC: 38 MG/DL (ref 8–23)
CALCIUM SERPL-MCNC: 9.7 MG/DL (ref 8.3–10.4)
CHLORIDE SERPL-SCNC: 103 MMOL/L (ref 98–107)
CHOLEST SERPL-MCNC: 125 MG/DL
CO2 SERPL-SCNC: 33 MMOL/L (ref 21–32)
COLOR UR: YELLOW
CREAT SERPL-MCNC: 1.6 MG/DL (ref 0.8–1.5)
DIFFERENTIAL METHOD BLD: ABNORMAL
EOSINOPHIL # BLD: 0.2 K/UL (ref 0–0.8)
EOSINOPHIL NFR BLD: 2 % (ref 0.5–7.8)
ERYTHROCYTE [DISTWIDTH] IN BLOOD BY AUTOMATED COUNT: 16.2 % (ref 11.9–14.6)
EST. AVERAGE GLUCOSE BLD GHB EST-MCNC: NORMAL MG/DL
GLOBULIN SER CALC-MCNC: 3.3 G/DL (ref 2.3–3.5)
GLUCOSE SERPL-MCNC: 97 MG/DL (ref 65–100)
GLUCOSE UR STRIP.AUTO-MCNC: NEGATIVE MG/DL
HBA1C MFR BLD: NORMAL % (ref 4.2–5.8)
HCT VFR BLD AUTO: 36.7 % (ref 41.1–50.3)
HDLC SERPL-MCNC: 59 MG/DL (ref 40–60)
HDLC SERPL: 2.1 {RATIO}
HGB BLD-MCNC: 11.3 G/DL (ref 13.6–17.2)
HGB UR QL STRIP: NEGATIVE
IMM GRANULOCYTES # BLD AUTO: 0 K/UL (ref 0–0.5)
IMM GRANULOCYTES NFR BLD AUTO: 0 % (ref 0–5)
KETONES UR QL STRIP.AUTO: NEGATIVE MG/DL
LDLC SERPL CALC-MCNC: 49.4 MG/DL
LEUKOCYTE ESTERASE UR QL STRIP.AUTO: NEGATIVE
LYMPHOCYTES # BLD: 1.6 K/UL (ref 0.5–4.6)
LYMPHOCYTES NFR BLD: 23 % (ref 13–44)
MCH RBC QN AUTO: 28.9 PG (ref 26.1–32.9)
MCHC RBC AUTO-ENTMCNC: 30.8 G/DL (ref 31.4–35)
MCV RBC AUTO: 93.9 FL (ref 79.6–97.8)
MONOCYTES # BLD: 0.9 K/UL (ref 0.1–1.3)
MONOCYTES NFR BLD: 12 % (ref 4–12)
NEUTS SEG # BLD: 4.3 K/UL (ref 1.7–8.2)
NEUTS SEG NFR BLD: 62 % (ref 43–78)
NITRITE UR QL STRIP.AUTO: NEGATIVE
NRBC # BLD: 0 K/UL (ref 0–0.2)
PH UR STRIP: 5 [PH] (ref 5–9)
PLATELET # BLD AUTO: 202 K/UL (ref 150–450)
PMV BLD AUTO: 11.1 FL (ref 9.4–12.3)
POTASSIUM SERPL-SCNC: 4.6 MMOL/L (ref 3.5–5.1)
PROT SERPL-MCNC: 7.1 G/DL (ref 6.3–8.2)
PROT UR STRIP-MCNC: NEGATIVE MG/DL
RBC # BLD AUTO: 3.91 M/UL (ref 4.23–5.6)
SODIUM SERPL-SCNC: 143 MMOL/L (ref 136–145)
SP GR UR REFRACTOMETRY: 1.02 (ref 1–1.02)
TRIGL SERPL-MCNC: 83 MG/DL (ref 35–150)
TSH, 3RD GENERATION: 1.24 UIU/ML (ref 0.36–3.74)
UROBILINOGEN UR QL STRIP.AUTO: 0.2 EU/DL (ref 0.2–1)
VLDLC SERPL CALC-MCNC: 16.6 MG/DL (ref 6–23)
WBC # BLD AUTO: 7 K/UL (ref 4.3–11.1)

## 2022-06-16 RX ORDER — MULTIVITAMIN/IRON/FOLIC ACID 18MG-0.4MG
250 TABLET ORAL 2 TIMES DAILY
Qty: 60 TABLET | OUTPATIENT
Start: 2022-06-16

## 2022-06-16 NOTE — TELEPHONE ENCOUNTER
Results show that hemoglobin is a bit lower - may want to have some iron studies done to look for causes of anemia. Blood sugar is normal.  Kidney function is stable. Liver function is normal.  Cholesterol is normal with LDL cholesterol actually even better than August 2021. Thyroid function is normal.      Review of medications shows that magnesium was sent in for him on 3/3/22 for #60 with 6 refills so he should have enough to last until next office visit.

## 2022-06-17 NOTE — TELEPHONE ENCOUNTER
Patient's wife notified of results and Magnesium having refills already at pharmacy. Wife wanted to know if patient needed iron supplement?

## 2022-06-23 ENCOUNTER — HOSPITAL ENCOUNTER (OUTPATIENT)
Dept: CT IMAGING | Age: 87
Discharge: HOME | End: 2022-06-26
Payer: MEDICARE

## 2022-06-23 DIAGNOSIS — I49.5 SSS (SICK SINUS SYNDROME) (HCC): ICD-10-CM

## 2022-06-23 DIAGNOSIS — N18.30 STAGE 3 CHRONIC KIDNEY DISEASE, UNSPECIFIED WHETHER STAGE 3A OR 3B CKD (HCC): ICD-10-CM

## 2022-06-23 DIAGNOSIS — I50.22 CHRONIC SYSTOLIC (CONGESTIVE) HEART FAILURE (HCC): ICD-10-CM

## 2022-06-23 DIAGNOSIS — F01.518 VASCULAR DEMENTIA WITH BEHAVIORAL DISTURBANCE: ICD-10-CM

## 2022-06-23 PROCEDURE — 70450 CT HEAD/BRAIN W/O DYE: CPT

## 2022-06-29 ENCOUNTER — TELEPHONE (OUTPATIENT)
Dept: INTERNAL MEDICINE CLINIC | Facility: CLINIC | Age: 87
End: 2022-06-29

## 2022-07-28 ENCOUNTER — HOSPITAL ENCOUNTER (EMERGENCY)
Age: 87
Discharge: HOME | End: 2022-07-28
Attending: EMERGENCY MEDICINE
Payer: MEDICARE

## 2022-07-28 ENCOUNTER — HOSPITAL ENCOUNTER (EMERGENCY)
Dept: GENERAL RADIOLOGY | Age: 87
Discharge: HOME | End: 2022-07-31
Payer: MEDICARE

## 2022-07-28 ENCOUNTER — HOSPITAL ENCOUNTER (EMERGENCY)
Dept: CT IMAGING | Age: 87
Discharge: HOME | End: 2022-07-31
Payer: MEDICARE

## 2022-07-28 VITALS
HEIGHT: 69 IN | RESPIRATION RATE: 16 BRPM | WEIGHT: 155 LBS | HEART RATE: 63 BPM | SYSTOLIC BLOOD PRESSURE: 146 MMHG | BODY MASS INDEX: 22.96 KG/M2 | TEMPERATURE: 97.9 F | OXYGEN SATURATION: 96 % | DIASTOLIC BLOOD PRESSURE: 66 MMHG

## 2022-07-28 DIAGNOSIS — M50.33 OTHER CERVICAL DISC DEGENERATION, CERVICOTHORACIC REGION: ICD-10-CM

## 2022-07-28 DIAGNOSIS — N28.9 RENAL INSUFFICIENCY: ICD-10-CM

## 2022-07-28 DIAGNOSIS — S09.90XA INJURY OF HEAD, INITIAL ENCOUNTER: ICD-10-CM
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
## 2022-07-28 DIAGNOSIS — M41.9 SCOLIOSIS OF THORACOLUMBAR SPINE, UNSPECIFIED SCOLIOSIS TYPE: ICD-10-CM

## 2022-07-28 LAB
ALBUMIN SERPL-MCNC: 3.1 G/DL (ref 3.2–4.6)
ALBUMIN/GLOB SERPL: 1 {RATIO} (ref 1.2–3.5)
ALP SERPL-CCNC: 45 U/L (ref 50–136)
ALT SERPL-CCNC: 16 U/L (ref 12–65)
ANION GAP SERPL CALC-SCNC: 2 MMOL/L (ref 7–16)
AST SERPL-CCNC: 16 U/L (ref 15–37)
BASOPHILS # BLD: 0.1 K/UL (ref 0–0.2)
BASOPHILS NFR BLD: 1 % (ref 0–2)
BILIRUB SERPL-MCNC: 0.8 MG/DL (ref 0.2–1.1)
BUN SERPL-MCNC: 33 MG/DL (ref 8–23)
CALCIUM SERPL-MCNC: 8.6 MG/DL (ref 8.3–10.4)
CHLORIDE SERPL-SCNC: 105 MMOL/L (ref 98–107)
CO2 SERPL-SCNC: 33 MMOL/L (ref 21–32)
CREAT SERPL-MCNC: 1.5 MG/DL (ref 0.8–1.5)
DIFFERENTIAL METHOD BLD: ABNORMAL
EOSINOPHIL # BLD: 0.2 K/UL (ref 0–0.8)
EOSINOPHIL NFR BLD: 3 % (ref 0.5–7.8)
ERYTHROCYTE [DISTWIDTH] IN BLOOD BY AUTOMATED COUNT: 15 % (ref 11.9–14.6)
GLOBULIN SER CALC-MCNC: 3.1 G/DL (ref 2.3–3.5)
GLUCOSE SERPL-MCNC: 109 MG/DL (ref 65–100)
HCT VFR BLD AUTO: 35.5 % (ref 41.1–50.3)
HGB BLD-MCNC: 11.7 G/DL (ref 13.6–17.2)
IMM GRANULOCYTES # BLD AUTO: 0 K/UL (ref 0–0.5)
IMM GRANULOCYTES NFR BLD AUTO: 0 % (ref 0–5)
LYMPHOCYTES # BLD: 0.5 K/UL (ref 0.5–4.6)
LYMPHOCYTES NFR BLD: 6 % (ref 13–44)
MCH RBC QN AUTO: 29.3 PG (ref 26.1–32.9)
MCHC RBC AUTO-ENTMCNC: 33 G/DL (ref 31.4–35)
MCV RBC AUTO: 89 FL (ref 79.6–97.8)
MONOCYTES # BLD: 0.9 K/UL (ref 0.1–1.3)
MONOCYTES NFR BLD: 12 % (ref 4–12)
NEUTS SEG # BLD: 6 K/UL (ref 1.7–8.2)
NEUTS SEG NFR BLD: 78 % (ref 43–78)
NRBC # BLD: 0 K/UL (ref 0–0.2)
PLATELET # BLD AUTO: 109 K/UL (ref 150–450)
PMV BLD AUTO: 9 FL (ref 9.4–12.3)
POTASSIUM SERPL-SCNC: 3.9 MMOL/L (ref 3.5–5.1)
PROT SERPL-MCNC: 6.2 G/DL (ref 6.3–8.2)
RBC # BLD AUTO: 3.99 M/UL (ref 4.23–5.6)
SODIUM SERPL-SCNC: 140 MMOL/L (ref 136–145)
WBC # BLD AUTO: 7.6 K/UL (ref 4.3–11.1)

## 2022-07-28 PROCEDURE — 80053 COMPREHEN METABOLIC PANEL: CPT

## 2022-07-28 PROCEDURE — 73502 X-RAY EXAM HIP UNI 2-3 VIEWS: CPT

## 2022-07-28 PROCEDURE — 72125 CT NECK SPINE W/O DYE: CPT

## 2022-07-28 PROCEDURE — 72128 CT CHEST SPINE W/O DYE: CPT

## 2022-07-28 PROCEDURE — 99284 EMERGENCY DEPT VISIT MOD MDM: CPT

## 2022-07-28 PROCEDURE — 85025 COMPLETE CBC W/AUTO DIFF WBC: CPT

## 2022-07-28 PROCEDURE — 70450 CT HEAD/BRAIN W/O DYE: CPT

## 2022-07-28 RX ORDER — DIAZEPAM 5 MG/1
2.5 TABLET ORAL NIGHTLY PRN
Qty: 5 TABLET | Refills: 0 | Status: SHIPPED | OUTPATIENT
Start: 2022-07-28 | End: 2022-08-07

## 2022-07-28 ASSESSMENT — PAIN SCALES - GENERAL: PAINLEVEL_OUTOF10: 3

## 2022-07-28 ASSESSMENT — ENCOUNTER SYMPTOMS: BACK PAIN: 1

## 2022-07-28 ASSESSMENT — PAIN - FUNCTIONAL ASSESSMENT: PAIN_FUNCTIONAL_ASSESSMENT: 0-10

## 2022-07-28 ASSESSMENT — PAIN DESCRIPTION - DESCRIPTORS: DESCRIPTORS: TIGHTNESS

## 2022-07-28 ASSESSMENT — PAIN DESCRIPTION - ORIENTATION: ORIENTATION: LOWER

## 2022-07-28 ASSESSMENT — PAIN DESCRIPTION - LOCATION: LOCATION: BACK

## 2022-07-28 NOTE — ED NOTES
I have reviewed discharge instructions with the patient and wife. The patient and wife verbalized understanding. Patient left ED via private vehicle. Discharge Method: wheelchair to Home with wife and daughter. Opportunity for questions and clarification provided. Patient given 2 scripts. To continue your aftercare when you leave the hospital, you may receive an automated call from our care team to check in on how you are doing. This is a free service and part of our promise to provide the best care and service to meet your aftercare needs.  If you have questions, or wish to unsubscribe from this service please call [phone]. Thank you for Choosing our [facility] Emergency Department.        Escobar Bloom RN  07/28/22 2291

## 2022-07-28 NOTE — ED TRIAGE NOTES
Pt brought in by EMS from home for lower back pain. Pt fell 2 days ago while transferring to wheelchair. Pt is in too much pain to walk after fall.

## 2022-07-28 NOTE — ED PROVIDER NOTES
[facility]                   PCP:                Roel Tena MD               Age: 81 y.o. Sex: male       ICD-10-CM    1. Fall, initial encounter  [address] XXXA       2. Injury of head, initial encounter  S09.90XA       3. Other cervical disc degeneration, cervicothoracic region  M50.33       4. Scoliosis of thoracolumbar spine, unspecified scoliosis type  M41.9 diazePAM (VALIUM) 5 MG tablet      5. Renal insufficiency  N28.9           DISPOSITION Decision To Discharge 07/28/2022 12:22:53 PM       MDM  Number of Diagnoses or Management Options  Diagnosis management comments: Differential diagnoses: Head contusion, intracranial hemorrhage, C-spine injury, thoracic or lumbar compression fracture, renal insufficiency    Patient's blood work shows a creatinine of 1.5 and I have encouraged he and his wife to no longer give him oral anti-inflammatories. I will instead give him a prescription for the topical Voltaren gel that he can put over his low back. I told him to not take his wife's unknown muscle relaxant. I will write him for low-dose Valium 2.5 mg to only take at bedtime to help him sleep. He can follow-up with [place] [facility] if his neck and back pain do not improve as his imaging shows significant degenerative disc disease and scoliosis         Orders Placed This Encounter   Procedures    CT Head W/O Contrast    CT CSpine W/O Contrast    CT SPINE THORAC LUMB WO CONTRAST    XR HIP 2-3 VW W PELVIS LEFT    CBC with Auto Differential    CMP    Saline lock IV        Bakari Campos is a 81 y.o. male who presents to the Emergency Department with chief complaint of    Chief Complaint   Patient presents with    Back Pain      Patient presents to the emergency department due to a fall injury that occurred 2 days ago. He has a prior history of CVA with right-sided weakness in 2020.   He states that he was trying to transfer from a chair into his wheelchair when it rolled out from under him and he fell backwards landing on his butt, back and then striking the back of his head. No loss of consciousness. All other systems reviewed and are negative. Review of Systems   Cardiovascular:  Negative for chest pain. Musculoskeletal:  Positive for back pain and neck pain. Left buttock and pelvic discomfort   Neurological:  Negative for dizziness. All other systems reviewed and are negative.     Past Medical History:   Diagnosis Date    Abdominal pain 5/9/2016    Acute prostatitis     Backache, unspecified     Calculus of kidney     Cancer ([address]     prostate    Diabetes ([address]     hypoglycemia    Diverticulosis 1/14/14    Edema 5/9/2016    Elevated prostate specific antigen (PSA)     Essential hypertension, benign     GERD (gastroesophageal reflux disease)     H. pylori infection 12/1/14    s/p treatment    History of barium enema 3/18/14    ordered by Dr. Saqib Norwood, GI    History of BPH     Hypertension     Hypertrophy of prostate without urinary obstruction and other lower urinary tract symptoms (LUTS)     Impotence of organic origin     Lumbago     Malignant neoplasm of prostate ([address]     Microscopic hematuria     Multiple renal cysts 1/14/14    left kidney, by CT    Osteopenia determined by x-ray     Dexa, T Score -1.0    Other ill-defined conditions(799.89)     chronic back pain    PVCs (premature ventricular contractions) 5/9/2016    SSS (sick sinus syndrome) ([address] 5/9/2016    Stroke ([address] 12/15/2020        Past Surgical History:   Procedure Laterality Date    CHOLECYSTECTOMY      COLONOSCOPY  2010    2 polyps benign    HERNIA REPAIR      midline incisional    OTHER SURGICAL HISTORY      prostate; melanoma also-back; hernia    PROSTATECTOMY      TONSILLECTOMY AND ADENOIDECTOMY      UPPER GASTROINTESTINAL ENDOSCOPY  07/12/2017    UROLOGICAL SURGERY      cystoscopy with urethral dilatation        Family History   Problem Relation Age of Onset    Alzheimer's Disease Mother     Prostate Cancer Father         Social History     Socioeconomic History    Marital status:      Spouse name: None    Number of children: None    Years of education: None    Highest education level: None   Tobacco Use    Smoking status: Former     Packs/day: 2.00     Types: Cigarettes     Quit date: 3/5/1980     Years since quittin.4    Smokeless tobacco: Never   Substance and Sexual Activity    Alcohol use: Not Currently    Drug use: No     Social Determinants of Health     Physical Activity: Inactive    Days of Exercise per Week: 0 days    Minutes of Exercise per Session: 0 min        Allergies: Latex, Lisinopril, Adhesive tape, Benazepril, Hydrochlorothiazide, Amiodarone, and Valsartan    Previous Medications    ACETAMINOPHEN (TYLENOL 8 HOUR) 650 MG EXTENDED RELEASE TABLET    take 1 Tablet by oral route   as needed swallowing whole with water. Do not break, crush, dissolve and/or chew.     ATORVASTATIN (LIPITOR) 20 MG TABLET    Take 20 mg by mouth    CHOLECALCIFEROL 50 MCG ( UT) TABS    Take by mouth    CLONIDINE (CATAPRES) 0.1 MG TABLET    Take 1 tablet po daily prn BP greater than 160/90    COENZYME Q10 100 MG CAPS CAPSULE    Take 100 mg by mouth daily    DONEPEZIL (ARICEPT) 10 MG TABLET    Take 10 mg by mouth    HYDRALAZINE (APRESOLINE) 100 MG TABLET    Take 100 mg by mouth 3 times daily    HYDROCORTISONE 2.5 % CREAM    Insert into rectum 2-4 times/day as needed for rectal pain    MAGNESIUM OXIDE (MAGNESIUM-OXIDE) 250 MG TABS TABLET    Take 250 mg by mouth 2 times daily    MECLIZINE (ANTIVERT) 25 MG TABLET    Take 25 mg by mouth 3 times daily as needed    MELATONIN 500 MCG TBDP    Take 1 tablet by mouth at bedtime    METOPROLOL SUCCINATE (TOPROL XL) 50 MG EXTENDED RELEASE TABLET    Take 50 mg by mouth daily    ONDANSETRON (ZOFRAN) 4 MG TABLET    Take 4 mg by mouth every 8 hours as needed    SIMETHICONE (GAS-X EXTRA STRENGTH) 125 MG CAPS    as needed    SUCRALFATE (CARAFATE) 1 GM TABLET Take 1 tablet by mouth 4 times daily        Vitals signs and nursing note reviewed. Patient Vitals for the past 4 hrs:   Temp Pulse Resp BP SpO2   07/28/22 1010 97.9 °F (36.6 °C) 58 16 130/60 93 %          Physical Exam  Vitals and nursing note reviewed. Constitutional:       Appearance: Normal appearance. HENT:      Head: Normocephalic and atraumatic. Comments: No obvious hematoma or abrasions noted to the posterior occiput     Nose: Nose normal.      Mouth/Throat:      Mouth: Mucous membranes are moist.   Eyes:      Extraocular Movements: Extraocular movements intact. Pupils: Pupils are equal, round, and reactive to light. Neck:      Comments: Mild tenderness to palpation of the cervical spine. Cardiovascular:      Rate and Rhythm: Normal rate and regular rhythm. Pulses: Normal pulses. Heart sounds: Normal heart sounds. Pulmonary:      Effort: Pulmonary effort is normal.      Breath sounds: Normal breath sounds. Abdominal:      General: Abdomen is flat. There is no distension. Palpations: Abdomen is soft. Tenderness: There is no abdominal tenderness. There is no guarding. Musculoskeletal:      Comments: The patient has mild discomfort in the left gluteal and hip region with internal and external rotation but no bony tenderness to palpation of the lateral aspect of the left hip. No bony tenderness to palpation in the knees or ankles or in the upper extremities. Normal range of motion in all joints. Skin:     General: Skin is warm and dry. Capillary Refill: Capillary refill takes less than 2 seconds. Neurological:      Mental Status: He is alert. Mental status is at baseline. Comments: Pt has slight chronic weakness in the right upper and lower extremity but no acute changes   Psychiatric:         Mood and Affect: Mood normal.         Behavior: Behavior normal.         Thought Content:  Thought content normal.        Procedures      Labs Reviewed   CBC WITH AUTO DIFFERENTIAL - Abnormal; Notable for the following components:       Result Value    RBC 3.99 (*)     Hemoglobin 11.7 (*)     Hematocrit 35.5 (*)     RDW 15.0 (*)     Platelets 077 (*)     MPV 9.0 (*)     Lymphocytes 6 (*)     All other components within normal limits   COMPREHENSIVE METABOLIC PANEL - Abnormal; Notable for the following components:    CO2 33 (*)     Anion Gap 2 (*)     Glucose 109 (*)     BUN 33 (*)     GFR  57 (*)     GFR Non-African American 47 (*)     Alk Phosphatase 45 (*)     Total Protein 6.2 (*)     Albumin 3.1 (*)     Albumin/Globulin Ratio 1.0 (*)     All other components within normal limits        XR HIP 2-3 VW W PELVIS LEFT   Final Result   NO ACUTE ABNORMALITY. CT Head W/O Contrast   Final Result   NO ACUTE INTRACRANIAL ABNORMALITY OR CALVARIAL FRACTURE   IDENTIFIED AT NONCONTRAST CT.               CT CSpine W/O Contrast   Final Result      1. Mild thoracolumbar dextroconvex scoliosis and multilevel spondylosis, but no   definite acute bony abnormality identified in the spine. 2.  Extensive aortoiliac atherosclerotic calcification and ectasia. CT SPINE [facility] LUMB WO CONTRAST   Final Result      1. Mild thoracolumbar dextroconvex scoliosis and multilevel spondylosis, but no   definite acute bony abnormality identified in the spine. 2.  Extensive aortoiliac atherosclerotic calcification and ectasia. Voice dictation software was used during the making of this note. This software is not perfect and grammatical and other typographical errors may be present. This note has not been completely proofread for errors.        Harish Landeros, [place]  07/28/22 1220

## 2022-08-09 ENCOUNTER — TELEPHONE (OUTPATIENT)
Dept: INTERNAL MEDICINE CLINIC | Facility: CLINIC | Age: 87
End: 2022-08-09

## 2022-08-09 DIAGNOSIS — R26.81 UNSTEADINESS ON FEET: ICD-10-CM

## 2022-08-09 DIAGNOSIS — M47.895 OTHER OSTEOARTHRITIS OF SPINE, THORACOLUMBAR REGION: ICD-10-CM

## 2022-08-09 DIAGNOSIS — I69.998 WEAKNESS DUE TO OLD STROKE: ICD-10-CM

## 2022-08-09 DIAGNOSIS — R29.6 FALLS FREQUENTLY: ICD-10-CM

## 2022-08-09 DIAGNOSIS — I68.0 CEREBRAL AMYLOID ANGIOPATHY (CODE): Primary | ICD-10-CM

## 2022-08-09 DIAGNOSIS — R53.1 WEAKNESS DUE TO OLD STROKE: ICD-10-CM

## 2022-08-09 NOTE — TELEPHONE ENCOUNTER
Pt's spouse called stating that pt had a fall in the home a couple of weeks ago due to weakness after having a stroke. She states that she pt has seen you since having his stroke and is requesting a referral to [facility] for PT, please.

## 2022-08-15 PROBLEM — I69.998 WEAKNESS DUE TO OLD STROKE: Status: ACTIVE | Noted: 2022-08-15

## 2022-08-15 PROBLEM — R29.6 FALLS FREQUENTLY: Status: ACTIVE | Noted: 2022-08-15

## 2022-08-15 PROBLEM — R26.81 UNSTEADINESS ON FEET: Status: ACTIVE | Noted: 2021-08-25

## 2022-08-15 PROBLEM — M47.9 SPINAL OSTEOARTHRITIS: Status: ACTIVE | Noted: 2022-08-15

## 2022-08-15 PROBLEM — R53.1 WEAKNESS DUE TO OLD STROKE: Status: ACTIVE | Noted: 2022-08-15

## 2022-08-15 NOTE — TELEPHONE ENCOUNTER
Orders Placed This Encounter    External Referral to Home Health     Referral Priority:   Routine     Referral Type:   Home Health Care     Referral Reason:   Specialty Services Required     Requested Specialty:   [address]     Number of Visits Requested:   1     I had spoken to the patient's wife the evening of 7/27/2022. The patient had a fall and we briefly discussed. There was no. Injury at that time. For the soreness he was advised to take Tylenol or a very rare Advil given his prior history of cerebral bleeding. The patient proceeded to go to the emergency room on 7/28/2022. X-rays were negative for acute stroke. There were no fractures. Home health services have been requested by the patient's wife. [facility] requested. See orders. Edgar Lam MD

## 2022-08-19 ENCOUNTER — TELEPHONE (OUTPATIENT)
Dept: INTERNAL MEDICINE CLINIC | Facility: CLINIC | Age: 87
End: 2022-08-19

## 2022-08-19 NOTE — TELEPHONE ENCOUNTER
[address] [facility] Therapist with [facility] states that they received orders for patient to start PT and [facility] services following a fall. She states patient declined OT. PT will be seeing patient 1 time a week for 7 weeks.   Left message for Maribeth Nichols giving her verbal ok for PT Plan Of Care

## 2022-08-24 ENCOUNTER — TELEPHONE (OUTPATIENT)
Dept: INTERNAL MEDICINE CLINIC | Facility: CLINIC | Age: 87
End: 2022-08-24

## 2022-08-24 DIAGNOSIS — R53.1 WEAKNESS DUE TO OLD STROKE: Primary | ICD-10-CM

## 2022-08-24 DIAGNOSIS — I69.998 WEAKNESS DUE TO OLD STROKE: Primary | ICD-10-CM

## 2022-08-24 DIAGNOSIS — I69.391 DYSPHAGIA DUE TO OLD CEREBROVASCULAR ACCIDENT: ICD-10-CM

## 2022-08-24 NOTE — TELEPHONE ENCOUNTER
Orders Placed This Encounter    [address] [facility]     Referral Priority:   Routine     Referral Type:   Eval and Treat     Referral Reason:   Specialty Services Required     Requested Specialty:   Speech Pathology     Number of Visits Requested:   1         Edgar Tomas MD

## 2022-08-24 NOTE — TELEPHONE ENCOUNTER
Tanja Pastor from [facility], [facility]. requesting speech therapy due to choking on thin liquid.     Called Tanja Pastor giving her verbal ok for adding on speech therapy

## 2022-09-04 DIAGNOSIS — I10 ESSENTIAL (PRIMARY) HYPERTENSION: ICD-10-CM

## 2022-09-06 ENCOUNTER — NURSE ONLY (OUTPATIENT)
Dept: UROLOGY | Age: 87
End: 2022-09-06
Payer: MEDICARE

## 2022-09-06 DIAGNOSIS — C61 MALIGNANT NEOPLASM OF PROSTATE (HCC): Primary | ICD-10-CM

## 2022-09-06 DIAGNOSIS — C61 MALIGNANT NEOPLASM OF PROSTATE (HCC): ICD-10-CM

## 2022-09-06 PROCEDURE — 36415 COLL VENOUS BLD VENIPUNCTURE: CPT | Performed by: UROLOGY

## 2022-09-06 RX ORDER — METOPROLOL SUCCINATE 50 MG/1
TABLET, EXTENDED RELEASE ORAL
Qty: 90 TABLET | Refills: 1 | OUTPATIENT
Start: 2022-09-06

## 2022-09-07 LAB — PSA SERPL-MCNC: <0.1 NG/ML

## 2022-09-09 DIAGNOSIS — I61.4: ICD-10-CM

## 2022-09-09 RX ORDER — ATORVASTATIN CALCIUM 20 MG/1
TABLET, FILM COATED ORAL
Qty: 90 TABLET | Refills: 1 | OUTPATIENT
Start: 2022-09-09

## 2022-09-13 ENCOUNTER — OFFICE VISIT (OUTPATIENT)
Dept: UROLOGY | Age: 87
End: 2022-09-13
Payer: MEDICARE

## 2022-09-13 DIAGNOSIS — C61 MALIGNANT NEOPLASM OF PROSTATE (HCC): Primary | ICD-10-CM

## 2022-09-13 DIAGNOSIS — R31.0 GROSS HEMATURIA: ICD-10-CM

## 2022-09-13 LAB
BILIRUBIN, URINE, POC: NEGATIVE
BLOOD URINE, POC: NEGATIVE
GLUCOSE URINE, POC: NEGATIVE
KETONES, URINE, POC: NEGATIVE
LEUKOCYTE ESTERASE, URINE, POC: NEGATIVE
NITRITE, URINE, POC: NEGATIVE
PH, URINE, POC: 5.5 (ref 4.6–8)
PROTEIN,URINE, POC: 30
SPECIFIC GRAVITY, URINE, POC: 1.02 (ref 1–1.03)
URINALYSIS CLARITY, POC: NORMAL
URINALYSIS COLOR, POC: NORMAL
UROBILINOGEN, POC: NORMAL

## 2022-09-13 PROCEDURE — 1036F TOBACCO NON-USER: CPT | Performed by: UROLOGY

## 2022-09-13 PROCEDURE — G8420 CALC BMI NORM PARAMETERS: HCPCS | Performed by: UROLOGY

## 2022-09-13 PROCEDURE — 81003 URINALYSIS AUTO W/O SCOPE: CPT | Performed by: UROLOGY

## 2022-09-13 PROCEDURE — 99213 OFFICE O/P EST LOW 20 MIN: CPT | Performed by: UROLOGY

## 2022-09-13 PROCEDURE — 1123F ACP DISCUSS/DSCN MKR DOCD: CPT | Performed by: UROLOGY

## 2022-09-13 PROCEDURE — G8427 DOCREV CUR MEDS BY ELIG CLIN: HCPCS | Performed by: UROLOGY

## 2022-09-13 ASSESSMENT — ENCOUNTER SYMPTOMS: NAUSEA: 0

## 2022-09-13 NOTE — PROGRESS NOTES
[facility]  [address] [address] [facility], [address] [facility]  [phone]          Angelita Cutler  : 1935    Chief Complaint   Patient presents with    Follow-up          HPI     Angelita Cutler is a 81 y.o. male followed secondary to prostate cancer. Pt. previously followed by Dr. Dharmesh Edmondson secondary to ACP. He underwent RRP  with final path revealing 3+3=6 ACP, negative margin (pT2c, pNx, pMx). Pre RRP psa was 4.52. PSA has been undetectable since. Incontinence is only drops per day and he does not wear a pad. In regards to ED, he has tried cialis, levitra, viagra, and muse with little result. Trimix injection was mentioned by Dr. Dharmesh Edmondson, he is not interested. He presented in  with occasional SP pain, LEFT inguinal pain/soreness, and nocturia/slowing of urinary stream. CT  abd/pelvis with contrast revealed only B renal cysts in regards to the  system. Cystoscopy  revealed a wide open bladder neck. He returns after ER visit 21. This was due to severe LLQ pain and gross hematuria. Pain began to resolve prior to arrival in ER and gross hematuria has cleared since. CT that day without contrast revealed only a mild L proximal ureteral dilation and NO stones or other pathology.   (? Passed stone)      PSA:  0.00,  0.00,  0.03, 5/15 <0.1,  <0.1,  <0.006,  <0.1,  <0.1,  <0.1,  <0.1          Past Medical History:   Diagnosis Date    Abdominal pain 2016    Acute prostatitis     Backache, unspecified     Calculus of kidney     Cancer ([address]     prostate    Diabetes ([address]     hypoglycemia    Diverticulosis 14    Edema 2016    Elevated prostate specific antigen (PSA)     Essential hypertension, benign     GERD (gastroesophageal reflux disease)     H. pylori infection 14    s/p treatment    History of barium enema 3/18/14    ordered by Dr. Eyal Valle, GI    History of BPH     Hypertension Hypertrophy of prostate without urinary obstruction and other lower urinary tract symptoms (LUTS)     Impotence of organic origin     Lumbago     Malignant neoplasm of [address]     Microscopic hematuria     Multiple renal cysts 1/14/14    left kidney, by CT    Osteopenia determined by x-ray     Dexa, T Score -1.0    Other ill-defined conditions(799.89)     chronic back pain    PVCs (premature ventricular contractions) 5/9/2016    SSS (sick sinus syndrome) ([facility] 5/9/2016    St[facility] 12/15/2020     Past Surgical History:   Procedure Laterality Date    CHOLECYSTECTOMY      COLONOSCOPY  2010    2 polyps benign    HERNIA REPAIR      midline incisional    OTHER SURGICAL HISTORY      prostate; melanoma also-back; hernia    PROSTATECTOMY      TONSILLECTOMY AND ADENOIDECTOMY      UPPER GASTROINTESTINAL ENDOSCOPY  07/12/2017    UROLOGICAL SURGERY      cystoscopy with urethral dilatation     Current Outpatient Medications   Medication Sig Dispense Refill    coenzyme Q10 100 MG CAPS capsule Take 100 mg by mouth daily      acetaminophen (TYLENOL) 650 MG extended release tablet take 1 Tablet by oral route   as needed swallowing whole with water. Do not break, crush, dissolve and/or chew.       Simethicone 125 MG CAPS as needed      Melatonin 500 MCG TBDP Take 1 tablet by mouth at bedtime      sucralfate (CARAFATE) 1 GM tablet Take 1 tablet by mouth 4 times daily 120 tablet 1    atorvastatin (LIPITOR) 20 MG tablet Take 20 mg by mouth      Cholecalciferol 50 MCG (2000 UT) TABS Take by mouth      cloNIDine (CATAPRES) 0.1 MG tablet Take 1 tablet po daily prn BP greater than 160/90      donepezil (ARICEPT) 10 MG tablet Take 10 mg by mouth      hydrALAZINE (APRESOLINE) 100 MG tablet Take 100 mg by mouth 3 times daily      hydrocortisone 2.5 % cream Insert into rectum 2-4 times/day as needed for rectal pain      Magnesium Oxide (MAGNESIUM-OXIDE) 250 MG TABS tablet Take 250 mg by mouth 2 times daily      meclizine (ANTIVERT) 25 MG tablet Take 25 mg by mouth 3 times daily as needed      metoprolol succinate (TOPROL XL) 50 MG extended release tablet Take 50 mg by mouth daily      ondansetron (ZOFRAN) 4 MG tablet Take 4 mg by mouth every 8 hours as needed      diclofenac sodium (VOLTAREN) 1 % GEL Apply 2 g topically in the morning and 2 g at noon and 2 g in the evening and 2 g before bedtime. Do all this for 10 days. 150 g 0     No current facility-administered medications for this visit. Allergies   Allergen Reactions    Latex Rash    Lisinopril Other (See Comments)     Other reaction(s): Cough-Intolerance    Adhesive Tape      Other reaction(s): redness and rash    Benazepril Other (See Comments)    Hydrochlorothiazide Other (See Comments)     Hand redness and swelling.     Amiodarone Rash    Valsartan Other (See Comments)     lightheaded     Social History     Socioeconomic History    Marital status:      Spouse name: Not on file    Number of children: Not on file    Years of education: Not on file    Highest education level: Not on file   Occupational History    Not on file   Tobacco Use    Smoking status: Former     Packs/day: 2.00     Types: Cigarettes     Quit date: 3/5/1980     Years since quittin.5    Smokeless tobacco: Never   Substance and Sexual Activity    Alcohol use: Not Currently    Drug use: No    Sexual activity: Not on file   Other Topics Concern    Not on file   Social History Narrative    Not on file     Social Determinants of Health     Financial Resource Strain: Not on file   Food Insecurity: Not on file   Transportation Needs: Not on file   Physical Activity: Inactive    Days of Exercise per Week: 0 days    Minutes of Exercise per Session: 0 min   Stress: Not on file   Social Connections: Not on file   Intimate Partner Violence: Not on file   Housing Stability: Not on file     Family History   Problem Relation Age of Onset    Alzheimer's Disease Mother     Prostate Cancer Father        Review of Systems  Constitutional:   Negative for fever. GI:  Negative for nausea. Genitourinary:  Negative for flank pain. Urinalysis  UA - Dipstick  Results for orders placed or performed in visit on 09/13/22   AMB POC URINALYSIS DIP STICK AUTO W/O MICRO   Result Value Ref Range    Color (UA POC)      Clarity (UA POC)      Glucose, Urine, POC Negative Negative    Bilirubin, Urine, POC Negative Negative    KETONES, Urine, POC Negative Negative    Specific Gravity, Urine, POC 1.025 1.001 - 1.035    Blood (UA POC) Negative Negative    pH, Urine, POC 5.5 4.6 - 8.0    Protein, Urine, POC 30  Negative    Urobilinogen, POC 0.2 mg/dL     Nitrite, Urine, POC Negative Negative    Leukocyte Esterase, Urine, POC Negative Negative   Results for orders placed or performed in visit on 10/14/20   AMB POC URINALYSIS DIP STICK AUTO W/O MICRO   Result Value Ref Range    Color (UA POC) Yellow     Clarity (UA POC) Clear     Glucose, Urine, POC Negative Negative    Bilirubin, Urine, POC Trace Negative    Ketones, Urine, POC Negative Negative    Specific Gravity, Urine, POC 1.030 1.001 - 1.035 NA    Blood (UA POC) Trace Negative    pH, Urine, POC 5.5 4.6 - 8.0 NA    Protein, Urine, POC 3+ Negative    Urobilinogen, POC 0.2 mg/dL 0.2 - 1    Nitrite, Urine, POC Negative Negative    Leukocyte Esterase, Urine, POC Negative Negative       There were no vitals taken for this visit. GENERAL: NAD, ALERT, A&O x 3, GAIT rolling walker  LUNGS: easy work of breathing  ABDOMEN: soft, non tender  NEUROLOGIC: cranial nerves 2-12 grossly intact           Assessment and Plan    ICD-10-CM    1. Malignant neoplasm of prostate (HCC)  C61 AMB POC URINALYSIS DIP STICK AUTO W/O MICRO      2. Gross hematuria  R31.0 AMB POC URINALYSIS DIP STICK AUTO W/O MICRO        PSA remains undetectable. His overall health is deteriorating and his wife came with him today. Considering how far out he is from [facility], I don't think psa's are needed any longer.   He may follow up

## 2022-09-15 ENCOUNTER — OFFICE VISIT (OUTPATIENT)
Dept: INTERNAL MEDICINE CLINIC | Facility: CLINIC | Age: 87
End: 2022-09-15
Payer: MEDICARE

## 2022-09-15 VITALS
DIASTOLIC BLOOD PRESSURE: 62 MMHG | TEMPERATURE: 97.2 F | SYSTOLIC BLOOD PRESSURE: 124 MMHG | BODY MASS INDEX: 22.81 KG/M2 | WEIGHT: 154 LBS | HEIGHT: 69 IN | OXYGEN SATURATION: 96 % | HEART RATE: 48 BPM

## 2022-09-15 DIAGNOSIS — C61 MALIGNANT NEOPLASM OF PROSTATE (HCC): ICD-10-CM

## 2022-09-15 DIAGNOSIS — D64.9 MILD ANEMIA: Primary | ICD-10-CM

## 2022-09-15 DIAGNOSIS — I68.0 CEREBRAL AMYLOID ANGIOPATHY (CODE): ICD-10-CM

## 2022-09-15 DIAGNOSIS — Z23 ENCOUNTER FOR IMMUNIZATION: ICD-10-CM

## 2022-09-15 DIAGNOSIS — R53.1 WEAKNESS DUE TO OLD STROKE: ICD-10-CM

## 2022-09-15 DIAGNOSIS — I69.998 WEAKNESS DUE TO OLD STROKE: ICD-10-CM

## 2022-09-15 DIAGNOSIS — I10 PRIMARY HYPERTENSION: ICD-10-CM

## 2022-09-15 PROCEDURE — 1036F TOBACCO NON-USER: CPT | Performed by: INTERNAL MEDICINE

## 2022-09-15 PROCEDURE — 99214 OFFICE O/P EST MOD 30 MIN: CPT | Performed by: INTERNAL MEDICINE

## 2022-09-15 PROCEDURE — G8420 CALC BMI NORM PARAMETERS: HCPCS | Performed by: INTERNAL MEDICINE

## 2022-09-15 PROCEDURE — 1123F ACP DISCUSS/DSCN MKR DOCD: CPT | Performed by: INTERNAL MEDICINE

## 2022-09-15 PROCEDURE — 90694 VACC AIIV4 NO PRSRV 0.5ML IM: CPT | Performed by: INTERNAL MEDICINE

## 2022-09-15 PROCEDURE — G8427 DOCREV CUR MEDS BY ELIG CLIN: HCPCS | Performed by: INTERNAL MEDICINE

## 2022-09-15 PROCEDURE — G0008 ADMIN INFLUENZA VIRUS VAC: HCPCS | Performed by: INTERNAL MEDICINE

## 2022-09-15 RX ORDER — DONEPEZIL HYDROCHLORIDE 10 MG/1
10 TABLET, FILM COATED ORAL NIGHTLY
Qty: 90 TABLET | Refills: 3 | Status: SHIPPED | OUTPATIENT
Start: 2022-09-15

## 2022-09-15 RX ORDER — MULTIVITAMIN/IRON/FOLIC ACID 18MG-0.4MG
250 TABLET ORAL 2 TIMES DAILY
Qty: 180 TABLET | Refills: 3 | Status: SHIPPED | OUTPATIENT
Start: 2022-09-15

## 2022-09-15 RX ORDER — SUCRALFATE 1 G/1
1 TABLET ORAL 4 TIMES DAILY
Qty: 120 TABLET | Refills: 1 | Status: SHIPPED | OUTPATIENT
Start: 2022-09-15

## 2022-09-15 RX ORDER — ATORVASTATIN CALCIUM 20 MG/1
20 TABLET, FILM COATED ORAL DAILY
Qty: 90 TABLET | Refills: 3 | Status: SHIPPED | OUTPATIENT
Start: 2022-09-15

## 2022-09-15 RX ORDER — METOPROLOL SUCCINATE 50 MG/1
50 TABLET, EXTENDED RELEASE ORAL DAILY
Qty: 90 TABLET | Refills: 3 | Status: SHIPPED | OUTPATIENT
Start: 2022-09-15

## 2022-09-15 RX ORDER — HYDRALAZINE HYDROCHLORIDE 100 MG/1
100 TABLET, FILM COATED ORAL 3 TIMES DAILY
Qty: 270 TABLET | Refills: 3 | Status: SHIPPED | OUTPATIENT
Start: 2022-09-15

## 2022-09-15 NOTE — PROGRESS NOTES
ASSESSMENT/PLAN:    1. Mild anemia  Will call with results. Exam stable. No pallor changes. - CBC with Auto Differential  - Ferritin  - Iron    2. Encounter for immunization     - Influenza, FLUAD, (age 72 y+), IM, Preservative Free, 0.5 mL    3. Primary hypertension  Treatment regimen is effective. Medication refilled      - metoprolol succinate (TOPROL XL) 50 MG extended release tablet; Take 1 tablet by mouth daily  Dispense: 90 tablet; Refill: 3  - hydrALAZINE (APRESOLINE) 100 MG tablet; Take 1 tablet by mouth 3 times daily  Dispense: 270 tablet; Refill: 3  - Magnesium Oxide (MAGNESIUM-OXIDE) 250 MG TABS tablet; Take 1 tablet by mouth 2 times daily  Dispense: 180 tablet; Refill: 3    4. Cerebral amyloid angiopathy (CODE)  Swallowing and fall precautions. Symptoms about same. CT head stable. - donepezil (ARICEPT) 10 MG tablet; Take 1 tablet by mouth nightly  Dispense: 90 tablet; Refill: 3    5. Weakness due to old stroke  CT head stable     - atorvastatin (LIPITOR) 20 MG tablet; Take 1 tablet by mouth daily  Dispense: 90 tablet; Refill: 3    6. Malignant neoplasm of prostate [facility])  Lab Results   Component Value Date    PSA <0.1 09/07/2022    PSA <0.1 08/25/2021    PSA <0.1 02/26/2020             Evaluation and management of the chronic condition(s) delineated. No negative side effects reported. I have reviewed all the lab results. There are some abnormalities that are either expected or not critical to the patient's health, and are discussed in the office today and are addressed. Please refer to the above assessement and plan narrative and orders and follow up plan. Medication discussed and refilled as needed. Physical exam findings are stable unless otherwise indicated and this is addressed. The most recent lab work and imaging and consultant/urgent care visits and imaging are reviewed and discussed and considered during this visit encounter.       1. Mild anemia  -     CBC with Auto management changes. Mild Anemia  Patient presents for evaluation of anemia. Anemia was found by routine CBC. It has been mild and was noted slightly decreased on CBC trends. No bleeding. No abdominal pain. Avoids nsaids due to prior cerebral hemorrhagic stroke 12/2020 . Associated signs & symptoms: none apparent. Has been taking some over the counter iron supplementation. History of Stroke and chronic HTN Follow-up  He presents for follow-up of cerebral hemorrhage, right cerebellar 12/2020. He has residual symptoms of gait disturbance, balance disturbance, slurred speech, swallowing difficulty, general weakness and poor coordination . He denies  any significant changes since our last visit . Overall he feels the condition is about the same. Stroke risk factors include hyperlipidemia, hypertension, and SSS . CAT Scan Result (most recent):  CT THORACIC LUMBAR SPINE WO CONTRAST 07/28/2022    Narrative  CT CERVICAL, THORACIC, AND LUMBAR SPINE WITH ADDITIONAL REFORMATS    CLINICAL HISTORY:  NECK and back pain since fall 2 days ago. TECHNIQUE:  Axial images were obtained with spiral technique, and coronal and  sagittal reformats were produced. Radiation dose reduction was achieved using  one or all of the following techniques: automated exposure control, weight-based  dosing, iterative reconstruction. COMPARISON:  None. FINDINGS: Mild thoracolumbar dextroconvex scoliosis may be positional.  No  definite acute fracture or malalignment is evident, accounting for diffuse low  bone density and streak artifact associated with posterior instrumented fusion  at L4-5. There is multilevel degenerative disc disease, which is most marked at  C6-7 with associated mild central spinal stenosis. Extensive multilevel facet  and cervical uncovertebral spurring results in comparable foraminal narrowing as  well. There are multiple bilateral renal cysts.   Extensive aortoiliac  atherosclerotic calcification and ectasia without focal aneurysm. Impression  1. Mild thoracolumbar dextroconvex scoliosis and multilevel spondylosis, but no  definite acute bony abnormality identified in the spine. 2.  Extensive aortoiliac atherosclerotic calcification and ectasia. Dementia  He is here for evaluation and treatment of cognitive problems. He is accompanied by spouse. Primary caregiver is spouse. The family and the patient identify problems with changes in short term memory, getting disoriented outside of familiar environment, recalling words, and repetition of questions. Family and patient are concerned about  inability to maintain adequate nutrition, potential falls, general daily care  Functional Assessment:   Activities of Daily Living (ADLs):    He is independent in the following:   Requires assistance with the following: ambulation, bathing and hygiene, feeding, continence, grooming, toileting, dressing, and since stroke has chronic difficulty with swallowing and requires close oversight. He has been working with Speech therapy to help with swallowing coordination. Instrumental Activities of Daily Living (IADLs):    He is independent in the following: none  Requires assistance with the following: all ADLs. Home bound. Wife has daily direct care oversight and family is active and supportive. Prostate Cancer  Remote history of prostate cancer and surgery  Lab Results   Component Value Date    PSA <0.1 09/07/2022    PSA <0.1 08/25/2021    PSA <0.1 02/26/2020           Labs reviewed and discussed and medication refilled as needed for chronic medications during ov or adjusted based on lab results and/or our discussion as appropriate. See discussion. The patient's available records and electronic chart records are reviewed. The PMH, PSH, medications, allergies, medications, FH, health maintenance and vaccination status are all reviewed and updated as appropriate.     Records from outside providers have been 09/17/2020 10:43 AM    TSH 2.470 05/22/2020 09:16 AM       Lab Results   Component Value Date/Time    PSA <0.1 09/07/2022 12:36 AM    PSA <0.1 08/25/2021 10:57 AM    PSA <0.1 02/26/2020 11:12 AM       Lab Results   Component Value Date/Time    SPECGRAV 1.025 06/02/2022 04:11 PM    PHUR 7.5 12/15/2020 11:01 PM    COLORU YELLOW 06/02/2022 04:11 PM    CLARITYU TURBID 12/15/2020 11:01 PM    LEUKOCYTESUR Negative 06/02/2022 04:11 PM    PROTEINU Negative 06/02/2022 04:11 PM    GLUCOSEU Negative 06/02/2022 04:11 PM    KETUA Negative 06/02/2022 04:11 PM    BLOODU Negative 06/02/2022 04:11 PM    BILIRUBINUR Negative 06/02/2022 04:11 PM    BILIRUBINUR Negative 12/15/2020 11:01 PM    UROBILINOGEN 0.2 06/02/2022 04:11 PM    NITRU Negative 06/02/2022 04:11 PM    LABMICR Comment 05/22/2020 09:16 AM           Vitals:    09/15/22 1146   BP: 124/62   Site: Left Upper Arm   Position: Sitting   Cuff Size: Small Adult   Pulse: (!) 48   Temp: 97.2 °F (36.2 °C)   TempSrc: Temporal   SpO2: 96%   Weight: 154 lb (69.9 kg)   Height: 5' 9\" (1.753 m)     Wt Readings from Last 3 Encounters:   09/15/22 154 lb (69.9 kg)   07/28/22 155 lb (70.3 kg)   06/02/22 155 lb (70.3 kg)     BP Readings from Last 3 Encounters:   09/15/22 124/62   07/28/22 (!) 146/66   06/02/22 124/75     Physical Exam

## 2022-09-16 LAB
BASOPHILS # BLD: 0 K/UL (ref 0–0.2)
BASOPHILS NFR BLD: 1 % (ref 0–2)
DIFFERENTIAL METHOD BLD: ABNORMAL
EOSINOPHIL # BLD: 0.1 K/UL (ref 0–0.8)
EOSINOPHIL NFR BLD: 2 % (ref 0.5–7.8)
ERYTHROCYTE [DISTWIDTH] IN BLOOD BY AUTOMATED COUNT: 15 % (ref 11.9–14.6)
FERRITIN SERPL-MCNC: 62 NG/ML (ref 8–388)
HCT VFR BLD AUTO: 37 % (ref 41.1–50.3)
HGB BLD-MCNC: 11.4 G/DL (ref 13.6–17.2)
IMM GRANULOCYTES # BLD AUTO: 0 K/UL (ref 0–0.5)
IMM GRANULOCYTES NFR BLD AUTO: 0 % (ref 0–5)
IRON SERPL-MCNC: 87 UG/DL (ref 35–150)
LYMPHOCYTES # BLD: 1 K/UL (ref 0.5–4.6)
LYMPHOCYTES NFR BLD: 16 % (ref 13–44)
MCH RBC QN AUTO: 30.5 PG (ref 26.1–32.9)
MCHC RBC AUTO-ENTMCNC: 30.8 G/DL (ref 31.4–35)
MCV RBC AUTO: 98.9 FL (ref 79.6–97.8)
MONOCYTES # BLD: 0.8 K/UL (ref 0.1–1.3)
MONOCYTES NFR BLD: 13 % (ref 4–12)
NEUTS SEG # BLD: 4.2 K/UL (ref 1.7–8.2)
NEUTS SEG NFR BLD: 68 % (ref 43–78)
NRBC # BLD: 0 K/UL (ref 0–0.2)
PLATELET # BLD AUTO: 179 K/UL (ref 150–450)
PMV BLD AUTO: 10.2 FL (ref 9.4–12.3)
RBC # BLD AUTO: 3.74 M/UL (ref 4.23–5.6)
WBC # BLD AUTO: 6.1 K/UL (ref 4.3–11.1)

## 2022-09-27 ENCOUNTER — TELEPHONE (OUTPATIENT)
Dept: INTERNAL MEDICINE CLINIC | Facility: CLINIC | Age: 87
End: 2022-09-27

## 2022-09-27 NOTE — TELEPHONE ENCOUNTER
Patient's wife requesting results of labs.  Wife wants to know if patient is still anemic and needs to continue iron supplement

## 2022-10-20 ENCOUNTER — OFFICE VISIT (OUTPATIENT)
Dept: CARDIOLOGY CLINIC | Age: 87
End: 2022-10-20
Payer: MEDICARE

## 2022-10-20 VITALS
WEIGHT: 155 LBS | DIASTOLIC BLOOD PRESSURE: 66 MMHG | HEART RATE: 48 BPM | BODY MASS INDEX: 22.96 KG/M2 | SYSTOLIC BLOOD PRESSURE: 136 MMHG | HEIGHT: 69 IN

## 2022-10-20 DIAGNOSIS — E78.5 DYSLIPIDEMIA: Chronic | ICD-10-CM

## 2022-10-20 DIAGNOSIS — I10 ESSENTIAL (PRIMARY) HYPERTENSION: Primary | ICD-10-CM

## 2022-10-20 DIAGNOSIS — Z86.73 HISTORY OF ISCHEMIC STROKE: ICD-10-CM

## 2022-10-20 DIAGNOSIS — I10 PRIMARY HYPERTENSION: ICD-10-CM

## 2022-10-20 DIAGNOSIS — I49.3 PVCS (PREMATURE VENTRICULAR CONTRACTIONS): ICD-10-CM

## 2022-10-20 DIAGNOSIS — R07.2 PRECORDIAL PAIN: ICD-10-CM

## 2022-10-20 PROCEDURE — G8484 FLU IMMUNIZE NO ADMIN: HCPCS | Performed by: INTERNAL MEDICINE

## 2022-10-20 PROCEDURE — 1036F TOBACCO NON-USER: CPT | Performed by: INTERNAL MEDICINE

## 2022-10-20 PROCEDURE — G8420 CALC BMI NORM PARAMETERS: HCPCS | Performed by: INTERNAL MEDICINE

## 2022-10-20 PROCEDURE — 93000 ELECTROCARDIOGRAM COMPLETE: CPT | Performed by: INTERNAL MEDICINE

## 2022-10-20 PROCEDURE — 99214 OFFICE O/P EST MOD 30 MIN: CPT | Performed by: INTERNAL MEDICINE

## 2022-10-20 PROCEDURE — 1123F ACP DISCUSS/DSCN MKR DOCD: CPT | Performed by: INTERNAL MEDICINE

## 2022-10-20 PROCEDURE — G8428 CUR MEDS NOT DOCUMENT: HCPCS | Performed by: INTERNAL MEDICINE

## 2022-10-20 RX ORDER — FERROUS SULFATE 325(65) MG
325 TABLET ORAL
COMMUNITY

## 2022-10-20 ASSESSMENT — ENCOUNTER SYMPTOMS
COUGH: 0
ABDOMINAL PAIN: 0
BACK PAIN: 0
SHORTNESS OF BREATH: 0

## 2022-10-20 NOTE — PROGRESS NOTES
[address] [address] [address], [address]      10/20/22      NAME:  Presley Olmedo  : 1935  MRN: [id number]      SUBJECTIVE:   Presley Olmedo is a 81 y.o. male seen for a follow up visit regarding the following:     Chief Complaint   Patient presents with    Irregular Heart Beat    Congestive Heart Failure       HPI:   Patient with history of PVCs, HTN and dCHF. Still perseverates about multiple somatic complaints. He continues with extensive GI complaints. His anxiety drives labile BP. Echo 2018 with normal LVEF and mild valve disease. These are age appropriate changes and should not be associated with symptoms. He had hemorrhagic CVA 2020. He is stable and remains disabled. Past Medical History, Past Surgical History, Family history, Social History, and Medications were all reviewed with the patient today and updated as necessary. Current Outpatient Medications   Medication Sig Dispense Refill    ferrous sulfate (IRON 325) 325 (65 Fe) MG tablet Take 325 mg by mouth daily (with breakfast)      metoprolol succinate (TOPROL XL) 50 MG extended release tablet Take 1 tablet by mouth daily 90 tablet 3    hydrALAZINE (APRESOLINE) 100 MG tablet Take 1 tablet by mouth 3 times daily 270 tablet 3    atorvastatin (LIPITOR) 20 MG tablet Take 1 tablet by mouth daily 90 tablet 3    sucralfate (CARAFATE) 1 GM tablet Take 1 tablet by mouth 4 times daily 120 tablet 1    donepezil (ARICEPT) 10 MG tablet Take 1 tablet by mouth nightly 90 tablet 3    Magnesium Oxide (MAGNESIUM-OXIDE) 250 MG TABS tablet Take 1 tablet by mouth 2 times daily 180 tablet 3    coenzyme Q10 100 MG CAPS capsule Take 100 mg by mouth daily      acetaminophen (TYLENOL) 650 MG extended release tablet take 1 Tablet by oral route   as needed swallowing whole with water. Do not break, crush, dissolve and/or chew.       Melatonin 500 MCG TBDP Take 1 tablet by mouth at bedtime      Cholecalciferol 50 MCG (2000 UT) TABS Take by mouth      cloNIDine (CATAPRES) 0.1 MG tablet Take 1 tablet po daily prn BP greater than 160/90      meclizine (ANTIVERT) 25 MG tablet Take 25 mg by mouth 3 times daily as needed      ondansetron (ZOFRAN) 4 MG tablet Take 4 mg by mouth every 8 hours as needed      diclofenac sodium (VOLTAREN) 1 % GEL Apply 2 g topically in the morning and 2 g at noon and 2 g in the evening and 2 g before bedtime. Do all this for 10 days. (Patient not taking: Reported on 9/15/2022) 150 g 0    Simethicone 125 MG CAPS as needed (Patient not taking: No sig reported)      hydrocortisone 2.5 % cream Insert into rectum 2-4 times/day as needed for rectal pain (Patient not taking: No sig reported)       No current facility-administered medications for this visit.      Patient Active Problem List    Diagnosis Date Noted    Dyslipidemia 10/20/2022     Priority: High    Falls frequently 08/15/2022     Priority: Medium    Spinal osteoarthritis 08/15/2022     Priority: Medium    Weakness due to old stroke 08/15/2022     Priority: Medium    Vascular dementia with behavioral disturbance 06/02/2022     Priority: Medium    Chronic renal disease, stage III [facility]) [[id number]] 06/02/2022     Priority: Medium    Chronic systolic (congestive) heart failure 06/02/2022     Priority: Medium    Central sleep apnea 03/15/2022     Priority: Low    RBD (REM behavioral disorder) 03/15/2022     Priority: Low    Cerebral amyloid angiopathy (CODE) 03/03/2022     Priority: Low    Alzheimer's disease, unspecified (CODE) ([facility] 01/10/2022     Priority: Low    Hypersomnia 11/01/2021     Priority: Low    Vertigo 08/25/2021     Priority: Low    Unsteadiness on feet 08/25/2021     Priority: Low    Gait instability 08/25/2021     Priority: Low    CARL (obstructive sleep apnea) 06/30/2021     Priority: Low    Hypertension      Priority: Low    Cerebellar bleed ([facility] 12/16/2020     Priority: Low    GERD (gastroesophageal reflux disease)      Priority: Low Precordial pain 2020     Priority: Low    History of ischemic stroke 2019     Priority: Low    Status post Mohs surgery for squamous cell carcinoma of skin 2019     Priority: Low    PAD (peripheral artery disease) ([facility] 2018     Priority: Low     Complex CTA findings that suggest PAD and 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 Normal LVEF and no ischemia on stress testing. Started   Amiodarone but patient stopped medication. PVCs resolved and repeat   monitor with essentially noPVCs. SSS (sick sinus syndrome) ([facility] 2016     Priority: Low    Malignant neoplasm of prostate ([facility] 2015     Priority: Low    DDD (degenerative disc disease), lumbar 2015     Priority: Low    Diverticulosis 2014     Priority: Low    Multiple renal cysts 2014     Priority: Low     left kidney, by CT          Family History   Problem Relation Age of Onset    Alzheimer's Disease Mother     Prostate Cancer Father      Social History     Tobacco Use    Smoking status: Former     Packs/day: 2.00     Types: Cigarettes     Quit date: 3/5/1980     Years since quittin.6    Smokeless tobacco: Never   Substance Use Topics    Alcohol use: Not Currently       Review Of Symptoms    Review of Systems   Constitutional: Positive for malaise/fatigue. Negative for fever. HENT:  Negative for nosebleeds. Cardiovascular:  Negative for chest pain, dyspnea on exertion and palpitations. Respiratory:  Negative for cough and shortness of breath.     Musculoskeletal: Negative for back pain, muscle cramps, muscle weakness and myalgias. Gastrointestinal:  Negative for abdominal pain. Neurological:  Positive for weakness. Negative for dizziness. Physical Exam  Blood pressure 136/66, pulse (!) 48, height 5' 9\" (1.753 m), weight 155 lb (70.3 kg). Physical Exam  HENT:      Head: Normocephalic and atraumatic. Eyes:      Extraocular Movements: Extraocular movements intact. Cardiovascular:      Rate and Rhythm: Normal rate and regular rhythm. Heart sounds: No murmur heard. Pulmonary:      Effort: Pulmonary effort is normal.      Breath sounds: Normal breath sounds. Abdominal:      Palpations: Abdomen is soft. Musculoskeletal:         General: Normal range of motion. Skin:     General: Skin is warm and dry. Neurological:      Motor: Weakness present. Medical problems, medical history and test results were reviewed with the patient today.       Lab Results   Component Value Date    CHOL 125 06/02/2022    CHOL 148 08/25/2021    CHOL 166 09/17/2020     Lab Results   Component Value Date    TRIG 83 06/02/2022    TRIG 67 08/25/2021    TRIG 76 09/17/2020     Lab Results   Component Value Date    HDL 59 06/02/2022    HDL 62 08/25/2021    HDL 70 09/17/2020     Lab Results   Component Value Date    LDLCALC 49.4 06/02/2022    LDLCALC 73 08/25/2021    LDLCALC 82 09/17/2020     Lab Results   Component Value Date    LABVLDL 16.6 06/02/2022    LABVLDL 8 05/22/2020    LABVLDL 8.4 12/04/2019    VLDL 13 08/25/2021    VLDL 14 09/17/2020     Lab Results   Component Value Date    CHOLHDLRATIO 2.1 06/02/2022    CHOLHDLRATIO 2.4 12/04/2019        Lab Results   Component Value Date    LABA1C TEST NOT PERFORMED 06/02/2022     Lab Results   Component Value Date    EAG TEST NOT PERFORMED 06/02/2022        Lab Results   Component Value Date     07/28/2022    K 3.9 07/28/2022     07/28/2022    CO2 33 (H) 07/28/2022    BUN 33 (H) 07/28/2022    CREATININE 1.50 07/28/2022 GLUCOSE 109 (H) 07/28/2022    CALCIUM 8.6 07/28/2022    PROT 6.2 (L) 07/28/2022    LABALBU 3.1 (L) 07/28/2022    BILITOT 0.8 07/28/2022    ALKPHOS 45 (L) 07/28/2022    AST 16 07/28/2022    ALT 16 07/28/2022    LABGLOM 47 (L) 07/28/2022    GFRAA 57 (L) 07/28/2022    AGRATIO 1.9 02/28/2022    GLOB 3.1 07/28/2022       Lab Results   Component Value Date/Time     07/28/2022 10:42 AM    K 3.9 07/28/2022 10:42 AM     07/28/2022 10:42 AM    CO2 33 07/28/2022 10:42 AM    BUN 33 07/28/2022 10:42 AM    CREATININE 1.50 07/28/2022 10:42 AM    GLUCOSE 109 07/28/2022 10:42 AM    CALCIUM 8.6 07/28/2022 10:42 AM        Lab Results   Component Value Date    WBC 6.1 09/15/2022    HGB 11.4 (L) 09/15/2022    HCT 37.0 (L) 09/15/2022    MCV 98.9 (H) 09/15/2022     09/15/2022             ASSESSMENT:    Aaron Hoskins was seen today for follow-up. Diagnoses and all orders for this visit:    PVCs (premature ventricular contractions) - Did not tolerate amiodarone in past. He has stopped this again. No PVCs on exam and normal stress testing. Essential hypertension with goal blood pressure less than 130/85 - BP is much better. This was driven by anxiety. Overall well controlled. Off of diltiazem due to hypotension. Dyslipidemia - Well controlled on atorvastatin     Chest pain  -  Chronic and non-cardiac historically    Pulmonary Hypertension - Moderate to severe by echo 10/2022. History of CVA -now with relatively large hemorrhagic stroke 12/2020. Etiology not well understood. He is off of all anticoagulants as they felt there was recurrent microbleeding.       Problem List Items Addressed This Visit          Circulatory    PVCs (premature ventricular contractions)    Hypertension       Other    Dyslipidemia (Chronic)    Precordial pain    History of ischemic stroke     Other Visit Diagnoses       Essential (primary) hypertension    -  Primary    Relevant Orders    EKG 12 Lead (Completed)            There are no discontinued medications. Patient has been instructed and agrees to call our office with any issues or other concerns related to their cardiac condition(s) and/or complaint(s). Return in about 6 months (around 4/20/2023).        Kandis Silverio MD  10/20/2022

## 2022-10-25 ENCOUNTER — OFFICE VISIT (OUTPATIENT)
Dept: NEUROLOGY | Age: 87
End: 2022-10-25
Payer: MEDICARE

## 2022-10-25 VITALS
HEIGHT: 69 IN | WEIGHT: 155 LBS | SYSTOLIC BLOOD PRESSURE: 120 MMHG | HEART RATE: 47 BPM | DIASTOLIC BLOOD PRESSURE: 64 MMHG | BODY MASS INDEX: 22.96 KG/M2

## 2022-10-25 DIAGNOSIS — I68.0 CEREBRAL AMYLOID ANGIOPATHY (CODE): Primary | ICD-10-CM

## 2022-10-25 PROCEDURE — 99214 OFFICE O/P EST MOD 30 MIN: CPT | Performed by: PSYCHIATRY & NEUROLOGY

## 2022-10-25 PROCEDURE — G8420 CALC BMI NORM PARAMETERS: HCPCS | Performed by: PSYCHIATRY & NEUROLOGY

## 2022-10-25 PROCEDURE — 1123F ACP DISCUSS/DSCN MKR DOCD: CPT | Performed by: PSYCHIATRY & NEUROLOGY

## 2022-10-25 PROCEDURE — G8484 FLU IMMUNIZE NO ADMIN: HCPCS | Performed by: PSYCHIATRY & NEUROLOGY

## 2022-10-25 PROCEDURE — G8427 DOCREV CUR MEDS BY ELIG CLIN: HCPCS | Performed by: PSYCHIATRY & NEUROLOGY

## 2022-10-25 PROCEDURE — 1036F TOBACCO NON-USER: CPT | Performed by: PSYCHIATRY & NEUROLOGY

## 2022-10-25 RX ORDER — DONEPEZIL HYDROCHLORIDE 10 MG/1
10 TABLET, FILM COATED ORAL NIGHTLY
Qty: 90 TABLET | Refills: 3 | Status: SHIPPED | OUTPATIENT
Start: 2022-10-25

## 2022-10-25 ASSESSMENT — ENCOUNTER SYMPTOMS
EYES NEGATIVE: 1
RESPIRATORY NEGATIVE: 1
GASTROINTESTINAL NEGATIVE: 1
ALLERGIC/IMMUNOLOGIC NEGATIVE: 1

## 2022-10-25 NOTE — PROGRESS NOTES
[address], [place] [address], [address]  Phone: [phone] Fax [phone]  Dr. Loli Iyer      10/25/2022  Jm Cherry     Patient is referred by the following provider for consultation regarding as below:       I reviewed the available records and notes and have examined patient with the following findings:     Chief Complaint:  Chief Complaint   Patient presents with    Memory Loss          HPI: This is a right handed 81 y.o.  male here with his wife. The patient is a regular patient of Dr. Marianna Hong. Unfortunately this gentleman did suffer a in 2019 he had a right putamen infarct with multiple areas of hemosiderin staining suggesting cerebral amyloid angiopathy. That was an acute stroke at that time. 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 Where he uses a rolling walker. He also has sick sinus syndrome so severe obstructive sleep apnea REM behavioral sleep disorder hypertension diabetes and prostate cancer. He was placed on Aricept 10 mg a day and he is doing well with it. His memory is not progressed for stroke prevention he is on Lipitor 20 mg a day and we cannot use any blood thinners. He is stable comfortable alert and very focused. 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 No double vision blurred vision or loss of vision and his wife states his memory is doing fantastic. IMAGING REVIEW:  I REVIEWED PERTINENT  IMAGES AND REPORTS WITH THE PATIENT PERSONALLY, DIRECTLY AND FULLY.      Past Medical History:  Past Medical History:   Diagnosis Date    Abdominal pain 5/9/2016    Acute prostatitis     Backache, unspecified     Calculus of kidney     Cancer ([facility] [address]     prostate    Diabetes ([facility] [address]     hypoglycemia    Diverticulosis 1/14/14 file   Food Insecurity: Not on file   Transportation Needs: Not on file   Physical Activity: Inactive    Days of Exercise per Week: 0 days    Minutes of Exercise per Session: 0 min   Stress: Not on file   Social Connections: Not on file   Intimate Partner Violence: Not on file   Housing Stability: Not on file       Family History:   Family History   Problem Relation Age of Onset    Alzheimer's Disease Mother     Prostate Cancer Father        Current Outpatient Medications on File Prior to Visit   Medication Sig Dispense Refill    ferrous sulfate (IRON 325) 325 (65 Fe) MG tablet Take 325 mg by mouth daily (with breakfast)      metoprolol succinate (TOPROL XL) 50 MG extended release tablet Take 1 tablet by mouth daily 90 tablet 3    hydrALAZINE (APRESOLINE) 100 MG tablet Take 1 tablet by mouth 3 times daily 270 tablet 3    atorvastatin (LIPITOR) 20 MG tablet Take 1 tablet by mouth daily 90 tablet 3    sucralfate (CARAFATE) 1 GM tablet Take 1 tablet by mouth 4 times daily 120 tablet 1    donepezil (ARICEPT) 10 MG tablet Take 1 tablet by mouth nightly 90 tablet 3    Magnesium Oxide (MAGNESIUM-OXIDE) 250 MG TABS tablet Take 1 tablet by mouth 2 times daily 180 tablet 3    diclofenac sodium (VOLTAREN) 1 % GEL Apply 2 g topically in the morning and 2 g at noon and 2 g in the evening and 2 g before bedtime. Do all this for 10 days. 150 g 0    coenzyme Q10 100 MG CAPS capsule Take 100 mg by mouth daily      acetaminophen (TYLENOL) 650 MG extended release tablet take 1 Tablet by oral route   as needed swallowing whole with water. Do not break, crush, dissolve and/or chew.       Simethicone 125 MG CAPS as needed      Melatonin 500 MCG TBDP Take 1 tablet by mouth at bedtime      Cholecalciferol 50 MCG (2000 UT) TABS Take by mouth      cloNIDine (CATAPRES) 0.1 MG tablet Take 1 tablet po daily prn BP greater than 160/90      hydrocortisone 2.5 % cream Insert into rectum 2-4 times/day as needed for rectal pain      meclizine (ANTIVERT) 25 MG tablet Take 25 mg by mouth 3 times daily as needed      ondansetron (ZOFRAN) 4 MG tablet Take 4 mg by mouth every 8 hours as needed       No current facility-administered medications on file prior to visit. Allergies   Allergen Reactions    Latex Rash    Lisinopril Other (See Comments)     Other reaction(s): Cough-Intolerance    Adhesive Tape      Other reaction(s): redness and rash    Benazepril Other (See Comments)    Hydrochlorothiazide Other (See Comments)     Hand redness and swelling. Amiodarone Rash    Valsartan Other (See Comments)     lightheaded       Review of Systems:  Review of Systems   Constitutional: Negative. HENT: Negative. Eyes: Negative. Respiratory: Negative. Cardiovascular: Negative. Gastrointestinal: Negative. Endocrine: Negative. Genitourinary: Negative. Skin: Negative. Allergic/Immunologic: Negative. Neurological:         Right upper and right lower extremity dysmetria and cerebellar ataxia seen with his speech   Psychiatric/Behavioral: Negative. No flowsheet data found. No flowsheet data found. Vitals:    10/25/22 1034   BP: 120/64   Pulse: (!) 47   Weight: 155 lb (70.3 kg)   Height: 5' 9\" (1.753 m)        Physical Exam  Constitutional:       Appearance: Normal appearance. HENT:      Head: Normocephalic and atraumatic. Eyes:      Extraocular Movements: Extraocular movements intact and EOM normal.      Pupils: Pupils are equal, round, and reactive to light. Cardiovascular:      Rate and Rhythm: Normal rate and regular rhythm. Pulses: Normal pulses. Pulmonary:      Effort: Pulmonary effort is normal.   Abdominal:      General: Abdomen is flat. Neurological:      Mental Status: He is alert and oriented to person, place, and time.       Coordination: Finger-Nose-Finger Test abnormal, Heel to [employer] abnormal and Romberg Test abnormal.      Gait: Tandem walk abnormal.      Deep Tendon Reflexes:      Reflex Scores:       Tricep reflexes are 1+ on the right side and 1+ on the left side. Bicep reflexes are 1+ on the right side and 1+ on the left side. Brachioradialis reflexes are 1+ on the right side and 1+ on the left side. Patellar reflexes are 1+ on the right side and 1+ on the left side. Achilles reflexes are 1+ on the right side and 1+ on the left side. Neurologic Exam     Mental Status   Oriented to person, place, and time. Attention: normal. Concentration: normal.   Level of consciousness: alert  Knowledge: good. He knows the year the month the season the president the day of the week he can recall 3 of 3 objects in about 5 to 8 minutes. He can recall 911 he recalls [place]. He can draw a box minimally. But due to his right upper extremity ataxia he really cannot draw very well in the clock we just put off to the side. Cranial Nerves     CN II   Visual fields full to confrontation. CN III, IV, VI   Pupils are equal, round, and reactive to light. Extraocular motions are normal.     CN VII   Facial expression full, symmetric. Motor Exam   Right arm tone: increased  Left arm tone: normal  Right leg tone: increased  Left leg tone: normalCerebellar ataxia of the right upper and right lower extremity with rapid repetitive motions and finger-to-nose. Gait, Coordination, and Reflexes     Coordination   Romberg: positive  Finger to nose coordination: abnormal  Heel to shin coordination: abnormal  Tandem walking coordination: abnormal    Tremor   Resting tremor: absent  Intention tremor: absent  Action tremor: absent    Reflexes   Right brachioradialis: 1+  Left brachioradialis: 1+  Right biceps: 1+  Left biceps: 1+  Right triceps: 1+  Left triceps: 1+  Right patellar: 1+  Left patellar: 1+  Right achilles: 1+  Left achilles: 1+        Assessment   Assessment / Plan:    Karol Calhoun was seen today for memory loss.     Diagnoses and all orders for this visit:    Cerebral amyloid angiopathy (CODE) I would agree with Dr. Sherine Valladares that this seems very realistic as cerebral amyloid angiopathy. She he has had 2 significant strokes. And left with right upper and right lower extremity cerebellar ataxia. And some mild cognitive changes were to continue Aricept 10 mg a day and we have to avoid blood thinners. Other orders  -     donepezil (ARICEPT) 10 MG tablet; Take 1 tablet by mouth nightly        The Diagnosis and differential diagnostic considerations, and Rx Tx were reviewed with the patient at length. No orders of the defined types were placed in this encounter. I have spent greater than 50% of visit discussing and counseling of patient  for treatment and diagnostic plan review. Total time 30 min     . Notes: Patient is to continue all medications as directed by prescribing physicians. Continuations on today's visit are made based on the patient's report of current medications.              Dr. Lynsey Palacios  Consultation Neurology, Neurodiagnostics and Neurotherapeutics  Neuroelectrophysiology, EEG, EMG  [facility]  [address] [place] [address], [address] [facility]  Phone:  [phone]  Fax:   [phone]

## 2022-10-30 ENCOUNTER — HOSPITAL ENCOUNTER (EMERGENCY)
Age: 87
Discharge: HOME | End: 2022-10-30
Attending: STUDENT IN AN ORGANIZED HEALTH CARE EDUCATION/TRAINING PROGRAM
Payer: MEDICARE

## 2022-10-30 ENCOUNTER — HOSPITAL ENCOUNTER (EMERGENCY)
Dept: GENERAL RADIOLOGY | Age: 87
Discharge: HOME | End: 2022-11-02
Payer: MEDICARE

## 2022-10-30 VITALS
RESPIRATION RATE: 18 BRPM | WEIGHT: 155 LBS | OXYGEN SATURATION: 96 % | TEMPERATURE: 98.1 F | HEIGHT: 69 IN | HEART RATE: 61 BPM | DIASTOLIC BLOOD PRESSURE: 78 MMHG | BODY MASS INDEX: 22.96 KG/M2 | SYSTOLIC BLOOD PRESSURE: 160 MMHG

## 2022-10-30 DIAGNOSIS — J06.9 ACUTE UPPER RESPIRATORY INFECTION: Primary | ICD-10-CM

## 2022-10-30 LAB
ALBUMIN SERPL-MCNC: 3.9 G/DL (ref 3.2–4.6)
ALBUMIN/GLOB SERPL: 1.1 {RATIO} (ref 0.4–1.6)
ALP SERPL-CCNC: 83 U/L (ref 50–136)
ALT SERPL-CCNC: 77 U/L (ref 12–65)
ANION GAP SERPL CALC-SCNC: 4 MMOL/L (ref 2–11)
AST SERPL-CCNC: 106 U/L (ref 15–37)
BASOPHILS # BLD: 0 K/UL (ref 0–0.2)
BASOPHILS NFR BLD: 0 % (ref 0–2)
BILIRUB SERPL-MCNC: 0.8 MG/DL (ref 0.2–1.1)
BUN SERPL-MCNC: 29 MG/DL (ref 8–23)
CALCIUM SERPL-MCNC: 9.1 MG/DL (ref 8.3–10.4)
CHLORIDE SERPL-SCNC: 104 MMOL/L (ref 101–110)
CO2 SERPL-SCNC: 33 MMOL/L (ref 21–32)
CREAT SERPL-MCNC: 1.44 MG/DL (ref 0.8–1.5)
DIFFERENTIAL METHOD BLD: ABNORMAL
EKG ATRIAL RATE: 59 BPM
EKG DIAGNOSIS: NORMAL
EKG P AXIS: 60 DEGREES
EKG P-R INTERVAL: 150 MS
EKG Q-T INTERVAL: 472 MS
EKG QRS DURATION: 94 MS
EKG QTC CALCULATION (BAZETT): 467 MS
EKG R AXIS: 23 DEGREES
EKG T AXIS: 12 DEGREES
EKG VENTRICULAR RATE: 59 BPM
EOSINOPHIL # BLD: 0 K/UL (ref 0–0.8)
EOSINOPHIL NFR BLD: 0 % (ref 0.5–7.8)
ERYTHROCYTE [DISTWIDTH] IN BLOOD BY AUTOMATED COUNT: 13.7 % (ref 11.9–14.6)
GLOBULIN SER CALC-MCNC: 3.6 G/DL (ref 2.8–4.5)
GLUCOSE SERPL-MCNC: 129 MG/DL (ref 65–100)
HCT VFR BLD AUTO: 35.9 % (ref 41.1–50.3)
HGB BLD-MCNC: 11.6 G/DL (ref 13.6–17.2)
IMM GRANULOCYTES # BLD AUTO: 0 K/UL (ref 0–0.5)
IMM GRANULOCYTES NFR BLD AUTO: 0 % (ref 0–5)
LYMPHOCYTES # BLD: 0.7 K/UL (ref 0.5–4.6)
LYMPHOCYTES NFR BLD: 7 % (ref 13–44)
MAGNESIUM SERPL-MCNC: 2.4 MG/DL (ref 1.8–2.4)
MCH RBC QN AUTO: 30.8 PG (ref 26.1–32.9)
MCHC RBC AUTO-ENTMCNC: 32.3 G/DL (ref 31.4–35)
MCV RBC AUTO: 95.2 FL (ref 82–102)
MONOCYTES # BLD: 1.2 K/UL (ref 0.1–1.3)
MONOCYTES NFR BLD: 13 % (ref 4–12)
NEUTS SEG # BLD: 7.3 K/UL (ref 1.7–8.2)
NEUTS SEG NFR BLD: 79 % (ref 43–78)
NRBC # BLD: 0 K/UL (ref 0–0.2)
PLATELET # BLD AUTO: 134 K/UL (ref 150–450)
PMV BLD AUTO: 9.8 FL (ref 9.4–12.3)
POTASSIUM SERPL-SCNC: 3.9 MMOL/L (ref 3.5–5.1)
PROT SERPL-MCNC: 7.5 G/DL (ref 6.3–8.2)
RBC # BLD AUTO: 3.77 M/UL (ref 4.23–5.6)
SODIUM SERPL-SCNC: 141 MMOL/L (ref 133–143)
WBC # BLD AUTO: 9.2 K/UL (ref 4.3–11.1)

## 2022-10-30 PROCEDURE — 85025 COMPLETE CBC W/AUTO DIFF WBC: CPT

## 2022-10-30 PROCEDURE — 94761 N-INVAS EAR/PLS OXIMETRY MLT: CPT

## 2022-10-30 PROCEDURE — 99285 EMERGENCY DEPT VISIT HI MDM: CPT

## 2022-10-30 PROCEDURE — 83735 ASSAY OF MAGNESIUM: CPT

## 2022-10-30 PROCEDURE — 93005 ELECTROCARDIOGRAM TRACING: CPT | Performed by: STUDENT IN AN ORGANIZED HEALTH CARE EDUCATION/TRAINING PROGRAM

## 2022-10-30 PROCEDURE — 80053 COMPREHEN METABOLIC PANEL: CPT

## 2022-10-30 PROCEDURE — 71045 X-RAY EXAM CHEST 1 VIEW: CPT

## 2022-10-30 RX ORDER — BENZONATATE 100 MG/1
100 CAPSULE ORAL 2 TIMES DAILY PRN
Qty: 10 CAPSULE | Refills: 0 | Status: SHIPPED | OUTPATIENT
Start: 2022-10-30 | End: 2022-11-04

## 2022-10-30 RX ORDER — AZITHROMYCIN 250 MG/1
TABLET, FILM COATED ORAL
Qty: 1 PACKET | Refills: 0 | Status: SHIPPED | OUTPATIENT
Start: 2022-10-30 | End: 2022-11-03

## 2022-10-30 ASSESSMENT — ENCOUNTER SYMPTOMS
SINUS PAIN: 0
ABDOMINAL PAIN: 0
VOMITING: 0
DIARRHEA: 1
BACK PAIN: 0
NAUSEA: 0
SHORTNESS OF BREATH: 1
PHOTOPHOBIA: 0
COUGH: 1

## 2022-10-30 ASSESSMENT — PAIN - FUNCTIONAL ASSESSMENT: PAIN_FUNCTIONAL_ASSESSMENT: NONE - DENIES PAIN

## 2022-10-30 NOTE — ED TRIAGE NOTES
Pt c/o shortness of breath and cough that started yesterday. Pt denies fever. Pt states he had abdominal pain this morning after having diarrhea. Pt states he had dark colored stools that started during the night. Pt denies any vomiting. Pt states he went to urgent care today had a chest xr. Pt denies having blood work done. Pt ambulatory to triage with walker.

## 2022-10-31 NOTE — ED PROVIDER NOTES
Emergency Department Provider Note                   PCP:                Kaylan Reyes MD               Age: 81 y.o. Sex: male       ICD-10-CM    1. Acute upper respiratory infection  J06.9 azithromycin (ZITHROMAX Z-GENTRY) 250 MG tablet          DISPOSITION Decision To Discharge 10/30/2022 10:09:38 PM        MDM  Number of Diagnoses or Management Options  Acute upper respiratory infection  Diagnosis management comments: 26-year-old male presents to the emergency department after being sent here from local urgent care with concern for needing emergent surgery for possible perforated ulcer. Had an x-ray and they were concerned for free air. Patient has no abdominal pain whatsoever. Heme-negative dark stool, on iron supplementation. Patient is here for URI symptoms which is why he also went to the urgent care. Stable H&H, normal electrolytes and normal kidney function, normal lab work shows normal white count. Mildly elevated ALT AST of unclear etiology, normal magnesium. Chest x-ray shows no emergent findings. Patient likely viral URI. Will give a wait-and-see prescription for azithromycin if his symptoms worsen or do not improve by next 3 to 4 days. Advised to see his family physician in 2 to 3 days. Also given Tessalon Perles for the cough. Given strict return precautions. Patient and family voiced understanding and agreement with this plan. EKG interpretation shows sinus bradycardia, rate 59, , QRS 94, QTc 467, normal axis, no significant ST elevation or depression.        Amount and/or Complexity of Data Reviewed  Clinical lab tests: ordered and reviewed  Tests in the radiology section of CPT®: ordered and reviewed  Independent visualization of images, tracings, or specimens: yes    Risk of Complications, Morbidity, and/or Mortality  Presenting problems: moderate  Diagnostic procedures: moderate  Management options: moderate               Orders Placed This Encounter Procedures    XR CHEST 1 VIEW    CBC with Auto Differential    Comprehensive Metabolic Panel    Magnesium    Cardiac Monitor - ED Only    Continuous Pulse Oximetry    EKG 12 Lead    Saline lock IV        Medications - No data to display    New Prescriptions    AZITHROMYCIN (ZITHROMAX Z-GENTRY) 250 MG TABLET    Take 2 tablets (500 mg) on Day 1, and then take 1 tablet (250 mg) on days 2 through 5. BENZONATATE (TESSALON) 100 MG CAPSULE    Take 1 capsule by mouth 2 times daily as needed for Cough        Lin Jones is a 81 y.o. male who presents to the Emergency Department with chief complaint of    Chief Complaint   Patient presents with    Shortness of Breath    Cough    Diarrhea      81year-old male presents to the emerged department with URI symptoms. Wife at bedside primary historian. Reports patient has had a cold last week, did improve and then went to see family's past weekend, afterwards developed cough, upper restaurant congestion intermittent shortness of breath. Denies fever or chills. Wife reports prior hemorrhagic stroke so patient is limited on what decongestants he can take if she is very concerned about his blood pressure. Went to see local urgent care who had an x-ray and urgent care physician was concerned for free intraperitoneal air. Patient has no abdominal pain whatsoever. Did have 1 episode of dark stools yesterday but has improved after Imodium. Does take iron supplementation daily. Denies history of blood thinner usage or prior gastric ulcers. Nonproductive cough. Patient denies chest pain, back pain or swelling in lower extremities. Review of Systems   Constitutional:  Negative for chills and fever. HENT:  Positive for congestion. Negative for sinus pain. Eyes:  Negative for photophobia. Respiratory:  Positive for cough and shortness of breath. Cardiovascular:  Negative for chest pain. Gastrointestinal:  Positive for diarrhea.  Negative for abdominal pain, nausea and vomiting. Genitourinary:  Negative for difficulty urinating. Musculoskeletal:  Negative for back pain. Skin:  Negative for rash. Neurological:  Negative for syncope and headaches. Psychiatric/Behavioral:  Negative for confusion. All other systems reviewed and are negative.     Past Medical History:   Diagnosis Date    Abdominal pain 5/9/2016    Acute prostatitis     Backache, unspecified     Calculus of kidney     Cancer ([facility] [address]     prostate    Diabetes ([facility] [address]     hypoglycemia    Diverticulosis 1/14/14    Edema 5/9/2016    Elevated prostate specific antigen (PSA)     Essential hypertension, benign     GERD (gastroesophageal reflux disease)     H. pylori infection 12/1/14    s/p treatment    History of barium enema 3/18/14    ordered by Dr. Niles Essex, GI    History of BPH     Hypertension     Hypertrophy of prostate without urinary obstruction and other lower urinary tract symptoms (LUTS)     Impotence of organic origin     Lumbago     Malignant neoplasm of prostate ([facility] [address]     Microscopic hematuria     Multiple renal cysts 1/14/14    left kidney, by CT    Osteopenia determined by x-ray     Dexa, T Score -1.0    Other ill-defined conditions(799.89)     chronic back pain    PVCs (premature ventricular contractions) 5/9/2016    SSS (sick sinus syndrome) ([address] 5/9/2016    Stroke ([facility] [address] 12/15/2020        Past Surgical History:   Procedure Laterality Date    CHOLECYSTECTOMY      COLONOSCOPY  2010    2 polyps benign    HERNIA REPAIR      midline incisional    OTHER SURGICAL HISTORY      prostate; melanoma also-back; hernia    PROSTATECTOMY      TONSILLECTOMY AND ADENOIDECTOMY      UPPER GASTROINTESTINAL ENDOSCOPY  07/12/2017    UROLOGICAL SURGERY      cystoscopy with urethral dilatation        Family History   Problem Relation Age of Onset    Alzheimer's Disease Mother     Prostate Cancer Father         Social History     Socioeconomic History    Marital status:    Tobacco Use    Smoking status: Former     Packs/day: 2.00     Types: Cigarettes     Quit date: 3/5/1980     Years since quittin.6    Smokeless tobacco: Never   Vaping Use    Vaping Use: Never used   Substance and Sexual Activity    Alcohol use: Not Currently    Drug use: No     Social Determinants of Health     Physical Activity: Inactive    Days of Exercise per Week: 0 days    Minutes of Exercise per Session: 0 min         Latex, Lisinopril, Adhesive tape, Benazepril, Hydrochlorothiazide, Amiodarone, and Valsartan     Previous Medications    ACETAMINOPHEN (TYLENOL) 650 MG EXTENDED RELEASE TABLET    take 1 Tablet by oral route   as needed swallowing whole with water. Do not break, crush, dissolve and/or chew. ATORVASTATIN (LIPITOR) 20 MG TABLET    Take 1 tablet by mouth daily    CHOLECALCIFEROL 50 MCG ( UT) TABS    Take by mouth    CLONIDINE (CATAPRES) 0.1 MG TABLET    Take 1 tablet po daily prn BP greater than 160/90    COENZYME Q10 100 MG CAPS CAPSULE    Take 100 mg by mouth daily    DICLOFENAC SODIUM (VOLTAREN) 1 % GEL    Apply 2 g topically in the morning and 2 g at noon and 2 g in the evening and 2 g before bedtime. Do all this for 10 days.     DONEPEZIL (ARICEPT) 10 MG TABLET    Take 1 tablet by mouth nightly    DONEPEZIL (ARICEPT) 10 MG TABLET    Take 1 tablet by mouth nightly    FERROUS SULFATE (IRON 325) 325 (65 FE) MG TABLET    Take 325 mg by mouth daily (with breakfast)    HYDRALAZINE (APRESOLINE) 100 MG TABLET    Take 1 tablet by mouth 3 times daily    HYDROCORTISONE 2.5 % CREAM    Insert into rectum 2-4 times/day as needed for rectal pain    MAGNESIUM OXIDE (MAGNESIUM-OXIDE) 250 MG TABS TABLET    Take 1 tablet by mouth 2 times daily    MECLIZINE (ANTIVERT) 25 MG TABLET    Take 25 mg by mouth 3 times daily as needed    MELATONIN 500 MCG TBDP    Take 1 tablet by mouth at bedtime    METOPROLOL SUCCINATE (TOPROL XL) 50 MG EXTENDED RELEASE TABLET    Take 1 tablet by mouth daily    ONDANSETRON (ZOFRAN) 4 MG TABLET    Take 4 mg by mouth every 8 hours as needed    SIMETHICONE 125 MG CAPS    as needed    SUCRALFATE (CARAFATE) 1 GM TABLET    Take 1 tablet by mouth 4 times daily        Vitals signs and nursing note reviewed. Patient Vitals for the past 4 hrs:   Temp Pulse Resp BP SpO2   10/30/22 1908 -- -- -- (!) 160/80 --   10/30/22 1856 98.1 °F (36.7 °C) 57 16 (!) 187/82 96 %          Physical Exam  Vitals and nursing note reviewed. Constitutional:       General: He is not in acute distress. Appearance: Normal appearance. HENT:      Head: Normocephalic. Nose: Nose normal.      Mouth/Throat:      Mouth: Mucous membranes are moist.   Eyes:      Extraocular Movements: Extraocular movements intact. Cardiovascular:      Rate and Rhythm: Normal rate and regular rhythm. Pulses: Normal pulses. Heart sounds: Normal heart sounds. Pulmonary:      Effort: Pulmonary effort is normal. No respiratory distress. Breath sounds: Normal breath sounds. Abdominal:      General: Abdomen is flat. Palpations: Abdomen is soft. Tenderness: There is no abdominal tenderness. Genitourinary:     Rectum: Guaiac result negative. Comments: Rectal exam: Chaperoned by Silas Blevins RN, dark stool, heme-negative  Musculoskeletal:         General: No swelling. Normal range of motion. Cervical back: Normal range of motion. No rigidity. Skin:     General: Skin is warm. Capillary Refill: Capillary refill takes less than 2 seconds. Findings: No rash. Neurological:      General: No focal deficit present. Mental Status: He is alert and oriented to person, place, and time.    Psychiatric:         Mood and Affect: Mood normal.        Procedures    Results for orders placed or performed during the hospital encounter of 10/30/22   XR CHEST 1 VIEW    Narrative    Chest portable    CLINICAL INDICATION: Acute moderate coughing with shortness of breath, anemia,  diarrhea    COMPARISON: 12/17/2020    TECHNIQUE: single AP portable view chest at 9:52 PM semiupright     FINDINGS: Previously visualized bilateral perihilar opacities have decreased in  prominence and are now minimal.  There is no evidence of consolidation,  pneumothorax, pleural effusion or pulmonary edema. The mediastinal and hilar  contours are stable, cardiac silhouette again enlarged. Scattered  calcifications are not unusual for age. Surgical material projects over the  upper abdomen and lower mediastinum. Impression    No acute airspace disease.      CBC with Auto Differential   Result Value Ref Range    WBC 9.2 4.3 - 11.1 K/uL    RBC 3.77 (L) 4.23 - 5.6 M/uL    Hemoglobin 11.6 (L) 13.6 - 17.2 g/dL    Hematocrit 35.9 (L) 41.1 - 50.3 %    MCV 95.2 82.0 - 102.0 FL    MCH 30.8 26.1 - 32.9 PG    MCHC 32.3 31.4 - 35.0 g/dL    RDW 13.7 11.9 - 14.6 %    Platelets 596 (L) 496 - 450 K/uL    MPV 9.8 9.4 - 12.3 FL    nRBC 0.00 0.0 - 0.2 K/uL    Differential Type AUTOMATED      Seg Neutrophils 79 (H) 43 - 78 %    Lymphocytes 7 (L) 13 - 44 %    Monocytes 13 (H) 4.0 - 12.0 %    Eosinophils % 0 (L) 0.5 - 7.8 %    Basophils 0 0.0 - 2.0 %    Immature Granulocytes 0 0.0 - 5.0 %    Segs Absolute 7.3 1.7 - 8.2 K/UL    Absolute Lymph # 0.7 0.5 - 4.6 K/UL    Absolute Mono # 1.2 0.1 - 1.3 K/UL    Absolute Eos # 0.0 0.0 - 0.8 K/UL    Basophils Absolute 0.0 0.0 - 0.2 K/UL    Absolute Immature Granulocyte 0.0 0.0 - 0.5 K/UL   Comprehensive Metabolic Panel   Result Value Ref Range    Sodium 141 133 - 143 mmol/L    Potassium 3.9 3.5 - 5.1 mmol/L    Chloride 104 101 - 110 mmol/L    CO2 33 (H) 21 - 32 mmol/L    Anion Gap 4 2 - 11 mmol/L    Glucose 129 (H) 65 - 100 mg/dL    BUN 29 (H) 8 - 23 MG/DL    Creatinine 1.44 0.8 - 1.5 MG/DL    Est, Glom Filt Rate 47 (L) >60 ml/min/1.73m2    Calcium 9.1 8.3 - 10.4 MG/DL    Total Bilirubin 0.8 0.2 - 1.1 MG/DL    ALT 77 (H) 12 - 65 U/L     (H) 15 - 37 U/L    Alk Phosphatase 83 50 - 136 U/L    Total Protein 7.5 6.3 - 8.2 g/dL    Albumin 3.9 3.2 - 4.6 g/dL    Globulin 3.6 2.8 - 4.5 g/dL    Albumin/Globulin Ratio 1.1 0.4 - 1.6     Magnesium   Result Value Ref Range    Magnesium 2.4 1.8 - 2.4 mg/dL   EKG 12 Lead   Result Value Ref Range    Ventricular Rate 59 BPM    Atrial Rate 59 BPM    P-R Interval 150 ms    QRS Duration 94 ms    Q-T Interval 472 ms    QTc Calculation (Bazett) 467 ms    P Axis 60 degrees    R Axis 23 degrees    T Axis 12 degrees    Diagnosis Sinus bradycardia         XR CHEST 1 VIEW   Final Result   No acute airspace disease. Voice dictation software was used during the making of this note. This software is not perfect and grammatical and other typographical errors may be present. This note has not been completely proofread for errors.        Tylor Lange DO  10/30/22 [id number]

## 2022-10-31 NOTE — ED NOTES
I have reviewed discharge instructions with the patient. The patient verbalized understanding. Patient left ED via Discharge Method: wheelchair to Home with wife and son. Opportunity for questions and clarification provided. Patient given 2 scripts. To continue your aftercare when you leave the hospital, you may receive an automated call from our care team to check in on how you are doing. This is a free service and part of our promise to provide the best care and service to meet your aftercare needs.  If you have questions, or wish to unsubscribe from this service please call [phone]. Thank you for Choosing our [facility] Emergency Department.         Familia Harper RN  10/30/22 6830

## 2022-10-31 NOTE — DISCHARGE INSTRUCTIONS
You likely have a viral URI. If your symptoms worsen over the next 3 to 4 days then begin antibiotic therapy. Continue taking over-the-counter cough and cold medication. Return to the ER for worsening or worrisome symptoms. Otherwise see your family physician in 2 to 3 days.

## 2023-01-31 ENCOUNTER — OFFICE VISIT (OUTPATIENT)
Dept: INTERNAL MEDICINE CLINIC | Facility: CLINIC | Age: 88
End: 2023-01-31
Payer: MEDICARE

## 2023-01-31 VITALS
HEART RATE: 59 BPM | BODY MASS INDEX: 22.96 KG/M2 | HEIGHT: 69 IN | DIASTOLIC BLOOD PRESSURE: 66 MMHG | TEMPERATURE: 97.9 F | WEIGHT: 155 LBS | OXYGEN SATURATION: 97 % | SYSTOLIC BLOOD PRESSURE: 112 MMHG

## 2023-01-31 DIAGNOSIS — C61 MALIGNANT NEOPLASM OF PROSTATE (HCC): ICD-10-CM

## 2023-01-31 DIAGNOSIS — R09.81 NASAL CONGESTION: ICD-10-CM

## 2023-01-31 DIAGNOSIS — I69.998 WEAKNESS DUE TO OLD STROKE: ICD-10-CM

## 2023-01-31 DIAGNOSIS — I49.5 SSS (SICK SINUS SYNDROME) (HCC): ICD-10-CM

## 2023-01-31 DIAGNOSIS — D64.9 MILD ANEMIA: ICD-10-CM

## 2023-01-31 DIAGNOSIS — I73.9 PERIPHERAL VASCULAR DISEASE, UNSPECIFIED (HCC): ICD-10-CM

## 2023-01-31 DIAGNOSIS — I10 PRIMARY HYPERTENSION: ICD-10-CM

## 2023-01-31 DIAGNOSIS — I50.22 CHRONIC SYSTOLIC (CONGESTIVE) HEART FAILURE (HCC): ICD-10-CM

## 2023-01-31 DIAGNOSIS — N18.30 STAGE 3 CHRONIC KIDNEY DISEASE, UNSPECIFIED WHETHER STAGE 3A OR 3B CKD (HCC): ICD-10-CM

## 2023-01-31 DIAGNOSIS — I68.0 CEREBRAL AMYLOID ANGIOPATHY (CODE): ICD-10-CM

## 2023-01-31 DIAGNOSIS — K59.01 SLOW TRANSIT CONSTIPATION: ICD-10-CM

## 2023-01-31 DIAGNOSIS — K59.01 SLOW TRANSIT CONSTIPATION: Primary | ICD-10-CM

## 2023-01-31 DIAGNOSIS — R53.1 WEAKNESS DUE TO OLD STROKE: ICD-10-CM

## 2023-01-31 LAB
ERYTHROCYTE [DISTWIDTH] IN BLOOD BY AUTOMATED COUNT: 13.7 % (ref 11.9–14.6)
HCT VFR BLD AUTO: 39.1 % (ref 41.1–50.3)
HGB BLD-MCNC: 12 G/DL (ref 13.6–17.2)
MCH RBC QN AUTO: 31.2 PG (ref 26.1–32.9)
MCHC RBC AUTO-ENTMCNC: 30.7 G/DL (ref 31.4–35)
MCV RBC AUTO: 101.6 FL (ref 82–102)
NRBC # BLD: 0 K/UL (ref 0–0.2)
PLATELET # BLD AUTO: 149 K/UL (ref 150–450)
PMV BLD AUTO: 10.2 FL (ref 9.4–12.3)
RBC # BLD AUTO: 3.85 M/UL (ref 4.23–5.6)
WBC # BLD AUTO: 6.1 K/UL (ref 4.3–11.1)

## 2023-01-31 PROCEDURE — 1036F TOBACCO NON-USER: CPT | Performed by: INTERNAL MEDICINE

## 2023-01-31 PROCEDURE — 99214 OFFICE O/P EST MOD 30 MIN: CPT | Performed by: INTERNAL MEDICINE

## 2023-01-31 PROCEDURE — G8420 CALC BMI NORM PARAMETERS: HCPCS | Performed by: INTERNAL MEDICINE

## 2023-01-31 PROCEDURE — G8427 DOCREV CUR MEDS BY ELIG CLIN: HCPCS | Performed by: INTERNAL MEDICINE

## 2023-01-31 PROCEDURE — 1123F ACP DISCUSS/DSCN MKR DOCD: CPT | Performed by: INTERNAL MEDICINE

## 2023-01-31 PROCEDURE — G8484 FLU IMMUNIZE NO ADMIN: HCPCS | Performed by: INTERNAL MEDICINE

## 2023-01-31 RX ORDER — HYDRALAZINE HYDROCHLORIDE 100 MG/1
100 TABLET, FILM COATED ORAL 3 TIMES DAILY
Qty: 270 TABLET | Refills: 3 | Status: SHIPPED | OUTPATIENT
Start: 2023-01-31

## 2023-01-31 RX ORDER — DONEPEZIL HYDROCHLORIDE 10 MG/1
10 TABLET, FILM COATED ORAL NIGHTLY
Qty: 90 TABLET | Refills: 3 | Status: SHIPPED | OUTPATIENT
Start: 2023-01-31

## 2023-01-31 RX ORDER — SUCRALFATE 1 G/1
1 TABLET ORAL 4 TIMES DAILY
Qty: 120 TABLET | Refills: 1 | Status: SHIPPED | OUTPATIENT
Start: 2023-01-31

## 2023-01-31 RX ORDER — ATORVASTATIN CALCIUM 20 MG/1
20 TABLET, FILM COATED ORAL DAILY
Qty: 90 TABLET | Refills: 3 | Status: SHIPPED | OUTPATIENT
Start: 2023-01-31

## 2023-01-31 RX ORDER — SUCRALFATE 1 G/1
TABLET ORAL
Qty: 360 TABLET | Refills: 2 | OUTPATIENT
Start: 2023-01-31

## 2023-01-31 RX ORDER — METOPROLOL SUCCINATE 50 MG/1
50 TABLET, EXTENDED RELEASE ORAL DAILY
Qty: 90 TABLET | Refills: 3 | Status: SHIPPED | OUTPATIENT
Start: 2023-01-31

## 2023-01-31 RX ORDER — FLUTICASONE PROPIONATE 50 MCG
1 SPRAY, SUSPENSION (ML) NASAL DAILY
Qty: 32 G | Refills: 1 | Status: SHIPPED | OUTPATIENT
Start: 2023-01-31

## 2023-01-31 RX ORDER — MULTIVITAMIN/IRON/FOLIC ACID 18MG-0.4MG
250 TABLET ORAL 2 TIMES DAILY
Qty: 180 TABLET | Refills: 3 | Status: SHIPPED | OUTPATIENT
Start: 2023-01-31

## 2023-01-31 ASSESSMENT — ENCOUNTER SYMPTOMS
TROUBLE SWALLOWING: 1
CONSTIPATION: 1
ANAL BLEEDING: 0
COUGH: 0

## 2023-01-31 NOTE — PATIENT INSTRUCTIONS
Send to the lab today for CBC, BMP, and Iron lab draw.      I will notify of results.    Medication refilled    Flonase written for nasal congestion,  1 or 2 sprays to each nostril at night as needed.      Follow up in 3 months.

## 2023-01-31 NOTE — PROGRESS NOTES
ASSESSMENT/PLAN:     Patient Instructions   Send to the lab today for CBC, BMP, and Iron lab draw. I will notify of results. Medication refilled    Flonase written for nasal congestion,  1 or 2 sprays to each nostril at night as needed. Follow up in 3 months. Evaluation and management of the chronic condition(s) delineated. No negative side effects reported. I have reviewed all the lab results. There are some abnormalities that are either expected or not critical to the patient's health, and are discussed in the office today and are addressed. Please refer to the above assessement and plan narrative and orders and follow up plan. Medication discussed and refilled as needed. Physical exam findings are stable unless otherwise indicated and this is addressed. The most recent lab work and imaging and consultant/urgent care visits and imaging are reviewed and discussed and considered during this visit encounter. 1. Slow transit constipation  -     CBC; Future  -     Basic Metabolic Panel; Future  -     Iron; Future  2. 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 Cerebral amyloid angiopathy (CODE)  -     donepezil (ARICEPT) 10 MG tablet; Take 1 tablet by mouth nightly, Disp-90 tablet, R-3Normal  -     CBC; Future  -     Basic Metabolic Panel; Future  -     Iron; Future  4. Primary hypertension  -     hydrALAZINE (APRESOLINE) 100 MG tablet; Take 1 tablet by mouth 3 times daily, Disp-270 tablet, R-3Normal  -     Magnesium Oxide (MAGNESIUM-OXIDE) 250 MG TABS tablet; Take 1 tablet by mouth 2 times daily, Disp-180 tablet, R-3Normal  -     metoprolol succinate (TOPROL XL) 50 MG extended release tablet; Take 1 tablet by mouth daily, Disp-90 tablet, R-3Normal  -     CBC; Future  -     Basic Metabolic Panel; Future  -     Iron; Future  5. Nasal congestion  -     fluticasone (FLONASE) 50 MCG/ACT nasal spray; 1 spray by Each Nostril route daily, Disp-32 g, R-1Normal  -     CBC; Future  -     Basic Metabolic Panel; Future  -     Iron; Future  6. Chronic systolic (congestive) heart failure (HCC)  -     CBC; Future  -     Basic Metabolic Panel; Future  -     Iron; Future  7. Peripheral vascular disease, unspecified ([facility]  -     CBC; Future  -     Basic Metabolic Panel; Future  -     Iron; Future  8. SSS (sick sinus syndrome) (HCC)  -     CBC; Future  -     Basic Metabolic Panel; Future  -     Iron; Future  9. Malignant neoplasm of prostate ([facility]  -     CBC; Future  -     Basic Metabolic Panel; Future  -     Iron; Future  10. Stage 3 chronic kidney disease, unspecified whether stage 3a or 3b CKD (HCC)  -     CBC; Future  -     Basic Metabolic Panel; Future  -     Iron; Future  11. Mild anemia  -     CBC; Future  -     Basic Metabolic Panel; Future  -     Iron; Future         On this date, 23, I have spent 30 minutes reviewing previous notes, test results and face to face with the patient discussing the diagnosis and importance of compliance with the treatment plan as well as documenting on the day of the visit. An electronic signature was used to authenticate this note. -- Daniele Rodriguez MD     Return in about 3 months (around 2023). SUBJECTIVE/OBJECTIVE:  Chief Complaint   Patient presents with    3 Month Follow-Up       HPI:   Rafael Mccraty (: 1935 is a 81 y.o. male, here for evaluation of the following chief complaint(s):   Chief Complaint   Patient presents with    3 Month Follow-Up       Patient is here with his wife today for follow-up and management of chronic medical conditions, review of recent labs, review of any imaging completed since our last office visit and discuss any consultants opinions or management changes. Mild Anemia  Patient presents for evaluation of anemia. Anemia was found by routine CBC.  It has been mild and was noted slightly decreased on CBC trends. No bleeding. No abdominal pain. Avoids nsaids due to prior cerebral hemorrhagic stroke 12/2020 . Associated signs & symptoms: none apparent. Has been taking some over the counter iron supplementation. History of Stroke and chronic HTN Follow-up  He presents for follow-up of cerebral hemorrhage, right cerebellar 12/2020. He has residual symptoms of gait disturbance, balance disturbance, slurred speech, swallowing difficulty, general weakness and poor coordination . He denies  any significant changes since our last visit . Overall he feels the condition is about the same. Stroke risk factors include hyperlipidemia, hypertension, and SSS . CAT Scan Result (most recent):  CT THORACIC LUMBAR SPINE WO CONTRAST 07/28/2022    Narrative  CT CERVICAL, THORACIC, AND LUMBAR SPINE WITH ADDITIONAL REFORMATS    CLINICAL HISTORY:  NECK and back pain since fall 2 days ago. TECHNIQUE:  Axial images were obtained with spiral technique, and coronal and  sagittal reformats were produced. Radiation dose reduction was achieved using  one or all of the following techniques: automated exposure control, weight-based  dosing, iterative reconstruction. COMPARISON:  None. FINDINGS: Mild thoracolumbar dextroconvex scoliosis may be positional.  No  definite acute fracture or malalignment is evident, accounting for diffuse low  bone density and streak artifact associated with posterior instrumented fusion  at L4-5. There is multilevel degenerative disc disease, which is most marked at  C6-7 with associated mild central spinal stenosis. Extensive multilevel facet  and cervical uncovertebral spurring results in comparable foraminal narrowing as  well. There are multiple bilateral renal cysts. Extensive aortoiliac  atherosclerotic calcification and ectasia without focal aneurysm. Impression  1.   Mild thoracolumbar dextroconvex scoliosis and multilevel spondylosis, but no  definite acute bony abnormality identified in the spine. 2.  Extensive aortoiliac atherosclerotic calcification and ectasia. Dementia  He is here for evaluation and treatment of cognitive problems. He is accompanied by spouse. Primary caregiver is spouse. The family and the patient identify problems with changes in short term memory, getting disoriented outside of familiar environment, recalling words, and repetition of questions. Family and patient are concerned about  inability to maintain adequate nutrition, potential falls, general daily care  Functional Assessment:   Activities of Daily Living (ADLs):    He is independent in the following:   Requires assistance with the following: ambulation, bathing and hygiene, feeding, continence, grooming, toileting, dressing, and since stroke has chronic difficulty with swallowing and requires close oversight. He has been working with Speech therapy to help with swallowing coordination. Instrumental Activities of Daily Living (IADLs):    He is independent in the following: none  Requires assistance with the following: all ADLs. Home bound. Wife has daily direct care oversight and family is active and supportive. Prostate Cancer  Remote history of prostate cancer and surgery  Lab Results   Component Value Date    PSA <0.1 09/07/2022    PSA <0.1 08/25/2021    PSA <0.1 02/26/2020           Labs reviewed and discussed and medication refilled as needed for chronic medications during ov or adjusted based on lab results and/or our discussion as appropriate. See discussion. The patient's available records and electronic chart records are reviewed. The PMH, PSH, medications, allergies, medications, FH, health maintenance and vaccination status are all reviewed and updated as appropriate.     Records from outside providers have been reviewed, summarized, and considered as noted in the history of present illness, past medical history, and objective data of this note and encounter. Health Maintenance   Topic Date Due    Shingles vaccine (1 of 2) Never done    COVID-19 Vaccine (4 - Booster for Pfizer series) 12/09/2021    Lipids  06/02/2023    Depression Screen  06/02/2023    Annual Wellness Visit (AWV)  06/03/2023    Prostate Specific Antigen (PSA) Screening or Monitoring  09/07/2023    DTaP/Tdap/Td vaccine (2 - Td or Tdap) 05/07/2025    Flu vaccine  Completed    Pneumococcal 65+ years Vaccine  Completed    Hepatitis A vaccine  Aged Out    Hib vaccine  Aged Out    Meningococcal (ACWY) vaccine  Aged Out     Patient Active Problem List   Diagnosis    PVCs (premature ventricular contractions)    CARL (obstructive sleep apnea)    History of BPH    Vertigo    Cervicalgia    Unsteadiness on feet    Impotence of organic origin    PAD (peripheral artery disease) ([facility])    SSS (sick sinus syndrome) ([facility])    Precordial pain    Hypersomnia    DDD (degenerative disc disease), lumbar    Hypertension    Status post Mohs surgery for squamous cell carcinoma of skin    Alzheimer's disease, unspecified (CODE) ([address]    Cerebellar bleed ([address]    Diverticulosis    Malignant neoplasm of prostate ([address]    Chronic bilateral low back pain without sciatica    History of ischemic stroke    Cerebral amyloid angiopathy (CODE)    Microscopic hematuria    Gait instability    Osteopenia determined by x-ray    GERD (gastroesophageal reflux disease)    Multiple renal cysts    Central sleep apnea    RBD (REM behavioral disorder)    Vascular dementia with behavioral disturbance    Chronic renal disease, stage III ([facility]) [356328]    Chronic systolic (congestive) heart failure    Falls frequently    Spinal osteoarthritis    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 HENT:  Positive for congestion (nasal congestion using cpap at night. Using his wife's flonase and helps.   Needs his own Rx.) and trouble swallowing (no worse. Managing. No aspiration acute sx). Respiratory:  Negative for cough. Cardiovascular:  Negative for chest pain, palpitations and leg swelling. Gastrointestinal:  Positive for constipation (some constipation, but having BMs daily. Tolerating daily iron sulfate extended release. GERD sx controlled. Prefers to remain on carafate. ). Negative for anal bleeding. Genitourinary:  Negative for dysuria. Psychiatric/Behavioral:  Negative for agitation. No results found for this or any previous visit (from the past 2160 hour(s)).     Lab Results   Component Value Date/Time    WBC 9.2 10/30/2022 07:11 PM    HGB 11.6 10/30/2022 07:11 PM    HCT 35.9 10/30/2022 07:11 PM    MCV 95.2 10/30/2022 07:11 PM    RDW 13.7 10/30/2022 07:11 PM     10/30/2022 07:11 PM    NEUTOPHILPCT 68 02/28/2022 10:26 AM    LYMPHOPCT 7 10/30/2022 07:11 PM    LYMPHOPCT 17 02/28/2022 10:26 AM    MONOPCT 13 10/30/2022 07:11 PM    MONOPCT 11 02/28/2022 10:26 AM    EOSRELPCT 0 10/30/2022 07:11 PM    BASOPCT 0 10/30/2022 07:11 PM    BASOPCT 1 02/28/2022 10:26 AM    LYMPHSABS 0.7 10/30/2022 07:11 PM    LYMPHSABS 1.0 02/28/2022 10:26 AM    MONOSABS 1.2 10/30/2022 07:11 PM    MONOSABS 0.7 02/28/2022 10:26 AM    EOSABS 0.0 10/30/2022 07:11 PM    EOSABS 0.2 02/28/2022 10:26 AM    BASOSABS 0.0 10/30/2022 07:11 PM    IMMGRAN 0 10/30/2022 07:11 PM    GRANULOCYTEABSOLUTECOUNT 0.0 02/28/2022 10:26 AM       Lab Results   Component Value Date/Time     10/30/2022 07:11 PM    K 3.9 10/30/2022 07:11 PM     10/30/2022 07:11 PM    CO2 33 10/30/2022 07:11 PM    ANIONGAP 4 10/30/2022 07:11 PM    GLUCOSE 129 10/30/2022 07:11 PM    BUN 29 10/30/2022 07:11 PM    CREATININE 1.44 10/30/2022 07:11 PM    GFRAA 57 07/28/2022 10:42 AM    LABGLOM 47 10/30/2022 07:11 PM    CALCIUM 9.1 10/30/2022 07:11 PM    BILITOT 0.8 10/30/2022 07:11 PM    ALT 77 10/30/2022 07:11 PM     10/30/2022 07:11 PM    ALKPHOS 83 10/30/2022 07:11 PM    ALKPHOS 54 02/28/2022 10:26 AM    PROT 7.5 10/30/2022 07:11 PM    LABALBU 3.9 10/30/2022 07:11 PM    GLOB 3.6 10/30/2022 07:11 PM    ALBUMIN 1.1 10/30/2022 07:11 PM       Lab Results   Component Value Date/Time    CHOL 125 06/02/2022 04:11 PM    HDL 59 06/02/2022 04:11 PM    TRIG 83 06/02/2022 04:11 PM    LDLCALC 49.4 06/02/2022 04:11 PM    VLDL 13 08/25/2021 10:57 AM       Lab Results   Component Value Date/Time    LABA1C TEST NOT PERFORMED 06/02/2022 04:11 PM    LABA1C 5.7 09/17/2020 10:43 AM    LABA1C 5.6 12/04/2019 03:41 AM       Lab Results   Component Value Date/Time    TSH 1.710 08/25/2021 10:57 AM    TSH 1.770 09/17/2020 10:43 AM    TSH 2.470 05/22/2020 09:16 AM       Lab Results   Component Value Date/Time    PSA <0.1 09/07/2022 12:36 AM    PSA <0.1 08/25/2021 10:57 AM    PSA <0.1 02/26/2020 11:12 AM       Lab Results   Component Value Date/Time    SPECGRAV 1.025 06/02/2022 04:11 PM    PHUR 7.5 12/15/2020 11:01 PM    COLORU YELLOW 06/02/2022 04:11 PM    CLARITYU TURBID 12/15/2020 11:01 PM    LEUKOCYTESUR Negative 06/02/2022 04:11 PM    PROTEINU Negative 06/02/2022 04:11 PM    GLUCOSEU Negative 06/02/2022 04:11 PM    KETUA Negative 06/02/2022 04:11 PM    BLOODU Negative 06/02/2022 04:11 PM    BILIRUBINUR Negative 06/02/2022 04:11 PM    BILIRUBINUR Negative 12/15/2020 11:01 PM    UROBILINOGEN 0.2 06/02/2022 04:11 PM    NITRU Negative 06/02/2022 04:11 PM    LABMICR Comment 05/22/2020 09:16 AM           Vitals:    01/31/23 1437   BP: 112/66   Site: Left Upper Arm   Position: Sitting   Cuff Size: Small Adult   Pulse: 59   Temp: 97.9 °F (36.6 °C)   TempSrc: Skin   SpO2: 97%   Weight: 155 lb (70.3 kg)   Height: 5' 9\" (1.753 m)     Wt Readings from Last 3 Encounters:   01/31/23 155 lb (70.3 kg)   10/30/22 155 lb (70.3 kg)   10/25/22 155 lb (70.3 kg)     BP Readings from Last 3 Encounters:   01/31/23 112/66   10/30/22 (!) 160/78   10/25/22 120/64     Physical Exam  Vitals and nursing note reviewed. Constitutional:       Appearance: Normal appearance. He is not ill-appearing. HENT:      Head: Normocephalic and atraumatic. Nose: No congestion. Eyes:      Extraocular Movements: Extraocular movements intact. Conjunctiva/sclera: Conjunctivae normal.   Cardiovascular:      Rate and Rhythm: Normal rate and regular rhythm. Pulmonary:      Effort: Pulmonary effort is normal.      Breath sounds: Normal breath sounds. No wheezing or rales. Abdominal:      General: Bowel sounds are normal.      Palpations: Abdomen is soft. Tenderness: There is no abdominal tenderness. Musculoskeletal:      Right lower leg: No edema. Left lower leg: No edema. Skin:     Coloration: Skin is not pale. Neurological:      General: No focal deficit present. Mental Status: He is alert. Mental status is at baseline.    Psychiatric:         Mood and Affect: Mood normal.         Behavior: Behavior normal.

## 2023-02-01 LAB
ANION GAP SERPL CALC-SCNC: 4 MMOL/L (ref 2–11)
BUN SERPL-MCNC: 34 MG/DL (ref 8–23)
CALCIUM SERPL-MCNC: 9.1 MG/DL (ref 8.3–10.4)
CHLORIDE SERPL-SCNC: 107 MMOL/L (ref 101–110)
CO2 SERPL-SCNC: 33 MMOL/L (ref 21–32)
CREAT SERPL-MCNC: 1.5 MG/DL (ref 0.8–1.5)
GLUCOSE SERPL-MCNC: 86 MG/DL (ref 65–100)
IRON SERPL-MCNC: 70 UG/DL (ref 35–150)
POTASSIUM SERPL-SCNC: 4.2 MMOL/L (ref 3.5–5.1)
SODIUM SERPL-SCNC: 144 MMOL/L (ref 133–143)

## 2023-02-01 NOTE — RESULT ENCOUNTER NOTE
Please call. Iron and hemoglobin look good. I think chemistry panel looks fine. Ok to continue iron as discussed. Follow up at next scheduled visit.       Wang Jara MD

## 2023-05-16 ENCOUNTER — OFFICE VISIT (OUTPATIENT)
Dept: INTERNAL MEDICINE CLINIC | Facility: CLINIC | Age: 88
End: 2023-05-16
Payer: MEDICARE

## 2023-05-16 VITALS
WEIGHT: 157 LBS | DIASTOLIC BLOOD PRESSURE: 60 MMHG | HEIGHT: 69 IN | OXYGEN SATURATION: 96 % | SYSTOLIC BLOOD PRESSURE: 112 MMHG | BODY MASS INDEX: 23.25 KG/M2 | HEART RATE: 55 BPM | TEMPERATURE: 97.9 F

## 2023-05-16 DIAGNOSIS — I68.0 CEREBRAL AMYLOID ANGIOPATHY (CODE): ICD-10-CM

## 2023-05-16 DIAGNOSIS — N18.30 STAGE 3 CHRONIC KIDNEY DISEASE, UNSPECIFIED WHETHER STAGE 3A OR 3B CKD (HCC): ICD-10-CM

## 2023-05-16 DIAGNOSIS — D64.9 MILD ANEMIA: Primary | ICD-10-CM

## 2023-05-16 DIAGNOSIS — R09.81 NASAL CONGESTION: ICD-10-CM

## 2023-05-16 DIAGNOSIS — K21.9 GASTROESOPHAGEAL REFLUX DISEASE WITHOUT ESOPHAGITIS: ICD-10-CM

## 2023-05-16 PROBLEM — F01.518 VASCULAR DEMENTIA WITH BEHAVIORAL DISTURBANCE (HCC): Status: RESOLVED | Noted: 2022-06-02 | Resolved: 2023-05-16

## 2023-05-16 PROCEDURE — 1123F ACP DISCUSS/DSCN MKR DOCD: CPT | Performed by: INTERNAL MEDICINE

## 2023-05-16 PROCEDURE — G8427 DOCREV CUR MEDS BY ELIG CLIN: HCPCS | Performed by: INTERNAL MEDICINE

## 2023-05-16 PROCEDURE — 99213 OFFICE O/P EST LOW 20 MIN: CPT | Performed by: INTERNAL MEDICINE

## 2023-05-16 PROCEDURE — 1036F TOBACCO NON-USER: CPT | Performed by: INTERNAL MEDICINE

## 2023-05-16 PROCEDURE — G8420 CALC BMI NORM PARAMETERS: HCPCS | Performed by: INTERNAL MEDICINE

## 2023-05-16 RX ORDER — FLUTICASONE PROPIONATE 50 MCG
1 SPRAY, SUSPENSION (ML) NASAL DAILY
Qty: 32 G | Refills: 1 | Status: SHIPPED | OUTPATIENT
Start: 2023-05-16

## 2023-05-16 RX ORDER — SUCRALFATE 1 G/1
1 TABLET ORAL 4 TIMES DAILY
Qty: 120 TABLET | Refills: 1 | Status: SHIPPED | OUTPATIENT
Start: 2023-05-16

## 2023-05-16 ASSESSMENT — ENCOUNTER SYMPTOMS
COUGH: 0
CONSTIPATION: 1
ANAL BLEEDING: 0
TROUBLE SWALLOWING: 1

## 2023-05-16 NOTE — PROGRESS NOTES
ASSESSMENT/PLAN:     Patient Instructions   Labs are stable. No medication changes. Medication refilled    Follow up in 3 months with labs prior. See orders. Arlene Negro MD        Evaluation and management of the chronic condition(s) delineated. No negative side effects reported. I have reviewed all the lab results. There are some abnormalities that are either expected or not critical to the patient's health, and are discussed in the office today and are addressed. Please refer to the above assessement and plan narrative and orders and follow up plan. Medication discussed and refilled as needed. Physical exam findings are stable unless otherwise indicated and this is addressed. The most recent lab work and imaging and consultant/urgent care visits and imaging are reviewed and discussed and considered during this visit encounter. 1. Mild anemia  -     CBC; Future  -     Basic Metabolic Panel; Future  -     Ferritin; Future  2. Nasal congestion  -     fluticasone (FLONASE) 50 MCG/ACT nasal spray; 1 spray by Each Nostril route daily, Disp-32 g, R-1Normal  -     CBC; Future  -     Basic Metabolic Panel; Future  -     Ferritin; Future  3. Gastroesophageal reflux disease without esophagitis  -     sucralfate (CARAFATE) 1 GM tablet; Take 1 tablet by mouth 4 times daily, Disp-120 tablet, R-1Normal  -     CBC; Future  -     Basic Metabolic Panel; Future  -     Ferritin; Future  4. Cerebral amyloid angiopathy (CODE)  -     CBC; Future  -     Basic Metabolic Panel; Future  -     Ferritin; Future  5. Stage 3 chronic kidney disease, unspecified whether stage 3a or 3b CKD (HCC)  -     CBC; Future  -     Basic Metabolic Panel; Future  -     Ferritin;  Future         On this date, 5/16/23, I have spent 30 minutes reviewing previous notes, test results and face to face with the patient discussing the diagnosis and importance of compliance with the treatment plan as well as documenting on the day

## 2023-05-16 NOTE — PATIENT INSTRUCTIONS
Labs are stable. No medication changes. Medication refilled    Follow up in 3 months with labs prior. See orders.     Jocy Gibbs MD

## 2023-08-21 ENCOUNTER — TELEPHONE (OUTPATIENT)
Age: 88
End: 2023-08-21

## 2023-08-21 NOTE — TELEPHONE ENCOUNTER
Patients wife called stating her  has fallen and has the following issues :    Possibly suffered second stroke  Provider suggesting a heart monitor  Sent to [facility] for rehab  Cannot be transported for 2 weeks  Question by wife is whether he can wait two weeks for the monitor? Can the monitor be sent to facility to be fitted?

## 2023-08-21 NOTE — TELEPHONE ENCOUNTER
Pt.had a bad fall over a week and half ago was hospitalized at [facility]  for a week w/broken bones. Think he may have had a tiny stroke that caused the fall. Dc'd to [facility] for rehab. [facility] called and wants him to have a heart monitor placed. She can not get pt.transported from rehab w/out a big bill. He can not even walk yet. Could he start amonitor in a few weeks/Does  think he even needs to wear one?

## 2023-09-13 ENCOUNTER — TELEPHONE (OUTPATIENT)
Dept: NEUROLOGY | Age: 88
End: 2023-09-13

## 2023-09-13 NOTE — TELEPHONE ENCOUNTER
Patient's wife called and said that they are moving and they want Dr. Janette Olsen to send a referral to a neurologist in [facility].

## 2023-09-14 ENCOUNTER — TELEPHONE (OUTPATIENT)
Dept: INTERNAL MEDICINE CLINIC | Facility: CLINIC | Age: 88
End: 2023-09-14

## 2023-09-14 RX ORDER — CLONIDINE HYDROCHLORIDE 0.1 MG/1
0.1 TABLET ORAL 2 TIMES DAILY
Qty: 60 TABLET | Refills: 5 | Status: SHIPPED | OUTPATIENT
Start: 2023-09-14

## 2023-09-14 NOTE — TELEPHONE ENCOUNTER
Orders Placed This Encounter    cloNIDine (CATAPRES) 0.1 MG tablet     Sig: Take 1 tablet by mouth 2 times daily Take 1 tablet po daily prn BP greater than 160/90     Dispense:  60 tablet     Refill:  5         Edgar OREN Gonzalez MD

## 2023-09-14 NOTE — TELEPHONE ENCOUNTER
Pt's wife came to pt's appt today in hopes to discuss pt's condition. However due to the pt not being present, we were not able to complete the appointment. Wife, Bonnie Medina, would like to discuss the pt's condition before they move down to [place]. Please advise.

## 2023-09-14 NOTE — TELEPHONE ENCOUNTER
Spoke to wife, she wanted to know if Dr Dominique Gustafson knew any providers in [facility] ,where patient will be moving due to declining health. Wife was told Dr Dominique Gustafson did not know of any doctors in [place]. Wife was ask if there was anything else we could do to help. Wife states patient needs a refill on Clonidine. Wife was notified prescription was sent to pharmacy. Wife was very appreciative.

## 2023-09-28 RX ORDER — LEVETIRACETAM 500 MG/1
TABLET ORAL
COMMUNITY
Start: 2023-08-15 | End: 2023-09-28 | Stop reason: SDUPTHER

## 2023-09-28 RX ORDER — TAMSULOSIN HYDROCHLORIDE 0.4 MG/1
CAPSULE ORAL
COMMUNITY
Start: 2023-08-15 | End: 2023-09-28 | Stop reason: SDUPTHER

## 2023-09-28 RX ORDER — TAMSULOSIN HYDROCHLORIDE 0.4 MG/1
0.4 CAPSULE ORAL DAILY
Qty: 30 CAPSULE | Refills: 5 | Status: SHIPPED | OUTPATIENT
Start: 2023-09-28

## 2023-09-28 RX ORDER — LEVETIRACETAM 500 MG/1
500 TABLET ORAL 2 TIMES DAILY
Qty: 60 TABLET | Refills: 5 | Status: SHIPPED | OUTPATIENT
Start: 2023-09-28

## 2023-09-28 NOTE — TELEPHONE ENCOUNTER
----- Message from Ildean Wade sent at 9/26/2023  2:31 PM EDT -----  Subject: Message to Provider    QUESTIONS  Information for Provider? PT received Keppra and Flomax RX while in the   hospital. PT is currently at [employer]. He will be moving to   [address] [place] [address] when he is discharged. Daughter Yolanda Sewell is asking can the office   give him a 3 month refill on both meds to get him through until they est   with a new PCP. Please call daughter Yolanda Sewell. PT gave me verbal permission   to work with his daughter on his info (no hippa found)   ---------------------------------------------------------------------------  --------------  [address]  [phone]; OK to leave message on voicemail  ---------------------------------------------------------------------------  --------------  SCRIPT ANSWERS  Relationship to Patient? Other/Third Party  Representative Name? Yolanda Sewell   Is the representative on the Communication Release of Information (NELSON)   form in Epic?  Yes

## 2023-10-01 DIAGNOSIS — K21.9 GASTROESOPHAGEAL REFLUX DISEASE WITHOUT ESOPHAGITIS: ICD-10-CM

## 2023-10-02 RX ORDER — SUCRALFATE 1 G/1
1 TABLET ORAL 4 TIMES DAILY
Qty: 120 TABLET | Refills: 1 | OUTPATIENT
Start: 2023-10-02